# Patient Record
Sex: FEMALE | Race: WHITE | NOT HISPANIC OR LATINO | Employment: OTHER | URBAN - METROPOLITAN AREA
[De-identification: names, ages, dates, MRNs, and addresses within clinical notes are randomized per-mention and may not be internally consistent; named-entity substitution may affect disease eponyms.]

---

## 2017-01-10 ENCOUNTER — ALLSCRIPTS OFFICE VISIT (OUTPATIENT)
Dept: OTHER | Facility: OTHER | Age: 62
End: 2017-01-10

## 2017-01-10 DIAGNOSIS — M17.11 PRIMARY OSTEOARTHRITIS OF RIGHT KNEE: ICD-10-CM

## 2017-01-31 ENCOUNTER — GENERIC CONVERSION - ENCOUNTER (OUTPATIENT)
Dept: OTHER | Facility: OTHER | Age: 62
End: 2017-01-31

## 2017-02-02 ENCOUNTER — APPOINTMENT (OUTPATIENT)
Dept: PHYSICAL THERAPY | Facility: MEDICAL CENTER | Age: 62
End: 2017-02-02
Payer: COMMERCIAL

## 2017-02-02 PROCEDURE — 97162 PT EVAL MOD COMPLEX 30 MIN: CPT

## 2017-02-06 ENCOUNTER — GENERIC CONVERSION - ENCOUNTER (OUTPATIENT)
Dept: OTHER | Facility: OTHER | Age: 62
End: 2017-02-06

## 2017-02-07 ENCOUNTER — APPOINTMENT (OUTPATIENT)
Dept: PHYSICAL THERAPY | Facility: MEDICAL CENTER | Age: 62
End: 2017-02-07
Payer: COMMERCIAL

## 2017-02-10 ENCOUNTER — APPOINTMENT (OUTPATIENT)
Dept: PHYSICAL THERAPY | Facility: MEDICAL CENTER | Age: 62
End: 2017-02-10
Payer: COMMERCIAL

## 2017-02-13 ENCOUNTER — APPOINTMENT (OUTPATIENT)
Dept: PHYSICAL THERAPY | Facility: MEDICAL CENTER | Age: 62
End: 2017-02-13
Payer: COMMERCIAL

## 2017-02-13 PROCEDURE — 97110 THERAPEUTIC EXERCISES: CPT

## 2017-02-16 ENCOUNTER — APPOINTMENT (OUTPATIENT)
Dept: PHYSICAL THERAPY | Facility: MEDICAL CENTER | Age: 62
End: 2017-02-16
Payer: COMMERCIAL

## 2017-02-20 ENCOUNTER — APPOINTMENT (OUTPATIENT)
Dept: PHYSICAL THERAPY | Facility: MEDICAL CENTER | Age: 62
End: 2017-02-20
Payer: COMMERCIAL

## 2017-02-20 PROCEDURE — 97110 THERAPEUTIC EXERCISES: CPT

## 2017-02-22 ENCOUNTER — APPOINTMENT (OUTPATIENT)
Dept: PHYSICAL THERAPY | Facility: MEDICAL CENTER | Age: 62
End: 2017-02-22
Payer: COMMERCIAL

## 2017-02-22 PROCEDURE — 97110 THERAPEUTIC EXERCISES: CPT

## 2017-02-28 ENCOUNTER — APPOINTMENT (OUTPATIENT)
Dept: PHYSICAL THERAPY | Facility: MEDICAL CENTER | Age: 62
End: 2017-02-28
Payer: COMMERCIAL

## 2017-02-28 PROCEDURE — 97110 THERAPEUTIC EXERCISES: CPT

## 2017-03-02 ENCOUNTER — APPOINTMENT (OUTPATIENT)
Dept: PHYSICAL THERAPY | Facility: MEDICAL CENTER | Age: 62
End: 2017-03-02
Payer: COMMERCIAL

## 2017-03-02 PROCEDURE — 97110 THERAPEUTIC EXERCISES: CPT

## 2017-03-06 ENCOUNTER — ALLSCRIPTS OFFICE VISIT (OUTPATIENT)
Dept: OTHER | Facility: OTHER | Age: 62
End: 2017-03-06

## 2017-03-07 ENCOUNTER — APPOINTMENT (OUTPATIENT)
Dept: PHYSICAL THERAPY | Facility: MEDICAL CENTER | Age: 62
End: 2017-03-07
Payer: COMMERCIAL

## 2017-03-10 ENCOUNTER — APPOINTMENT (OUTPATIENT)
Dept: PHYSICAL THERAPY | Facility: MEDICAL CENTER | Age: 62
End: 2017-03-10
Payer: COMMERCIAL

## 2017-03-20 ENCOUNTER — APPOINTMENT (OUTPATIENT)
Dept: PHYSICAL THERAPY | Facility: MEDICAL CENTER | Age: 62
End: 2017-03-20
Payer: COMMERCIAL

## 2017-03-27 ENCOUNTER — APPOINTMENT (OUTPATIENT)
Dept: PHYSICAL THERAPY | Facility: MEDICAL CENTER | Age: 62
End: 2017-03-27
Payer: COMMERCIAL

## 2017-03-27 PROCEDURE — 97110 THERAPEUTIC EXERCISES: CPT

## 2017-05-26 ENCOUNTER — ALLSCRIPTS OFFICE VISIT (OUTPATIENT)
Dept: OTHER | Facility: OTHER | Age: 62
End: 2017-05-26

## 2017-09-15 ENCOUNTER — ALLSCRIPTS OFFICE VISIT (OUTPATIENT)
Dept: OTHER | Facility: OTHER | Age: 62
End: 2017-09-15

## 2017-10-26 NOTE — PROGRESS NOTES
Assessment  1  Acute non-recurrent frontal sinusitis (461 1) (J01 10)    Plan  Acute non-recurrent frontal sinusitis    · Sulfamethoxazole-Trimethoprim 800-160 MG Oral Tablet (Bactrim DS); TAKE 1  TABLET TWICE DAILY UNTIL FINISHED    Discussion/Summary    #1: Acute sinus infection: Patient appears have a frontal sinus infection at this point  Recommend antibiotics, Flonase 2 sprays each nostril daily, Claritin 10 mg as needed for allergy symptoms  Bactrim DS BID  Possible side effects of new medications were reviewed with the patient/guardian today  The treatment plan was reviewed with the patient/guardian  The patient/guardian understands and agrees with the treatment plan      Chief Complaint  c/o throat tickle and ear pain x 1 day  History of Present Illness  HPI: She is [place] a [place] soon to [place]  does have her grandson and his girlfriend and their child  has some issues with ears and throat  face pain, but no tooth pain  did have increased pain last night  Review of Systems    Constitutional: No fever, no chills, feels well, no tiredness, no recent weight gain or loss  ENT: as noted in HPI  Cardiovascular: no complaints of slow or fast heart rate, no chest pain, no palpitations, no leg claudication or lower extremity edema  Respiratory: no complaints of shortness of breath, no wheezing, no dyspnea on exertion, no orthopnea or PND  Breasts: no complaints of breast pain, breast lump or nipple discharge  Gastrointestinal: no complaints of abdominal pain, no constipation, no nausea or diarrhea, no vomiting, no bloody stools  Genitourinary: no complaints of dysuria, no incontinence, no pelvic pain, no dysmenorrhea, no vaginal discharge or abnormal vaginal bleeding  Musculoskeletal: no complaints of arthralgia, no myalgia, no joint swelling or stiffness, no limb pain or swelling  Integumentary: no complaints of skin rash or lesion, no itching or dry skin, no skin wounds     Neurological: no complaints of headache, no confusion, no numbness or tingling, no dizziness or fainting  Active Problems  1  Acid reflux (530 81) (K21 9)   2  Allergic rhinitis (477 9) (J30 9)   3  Angular cheilitis (528 5) (K13 0)   4  Back spasm (724 8) (M62 830)   5  Bilateral carpal tunnel syndrome (354 0) (G56 03)   6  Bipolar 1 disorder (296 7) (F31 9)   7  Chronic pain of both knees (564 70,022 57) (M25 561,M25 562,G89 29)   8  Cough (786 2) (R05)   9  Diverticulosis (562 10) (K57 90)   10  Eustachian tube dysfunction (381 81) (H69 80)   11  Flu vaccine need (V04 81) (Z23)   12  Hyperlipidemia (272 4) (E78 5)   13  Paresthesias/numbness (782 0) (R20 9)   14  Primary localized osteoarthritis of left knee (715 16) (M17 12)   15  Primary osteoarthritis of right knee (715 16) (M17 11)   16  Screening for colon cancer (V76 51) (Z12 11)   17  Vitamin D deficiency (268 9) (E55 9)    Past Medical History  1  History of Acute frontal sinusitis (461 1) (J01 10)   2  History of Acute otitis media, unspecified laterality   3  History of Acute serous otitis media, unspecified laterality   4  History of Acute sinusitis (461 9) (J01 90)   5  History of acute sinusitis (V12 69) (Z87 09)   6  History of serous otitis media (V12 49) (Z86 69)   7  History of Otalgia, unspecified laterality (388 70) (H92 09)   8  History of Otitis media, left (382 9) (H66 92)   9  History of Uncomplicated alcohol abuse (305 00) (F10 10)   10  History of Visit for eye and vision exam (V72 0) (Z01 00)  Active Problems And Past Medical History Reviewed: The active problems and past medical history were reviewed and updated today  Family History  Mother    1  Family history of Breast Cancer (V16 3)  Father    2  Family history of Alcohol Abuse   3  Family history of Coronary Artery Disease (V17 49)  Brother    4  Family history of Alcohol Abuse   5  Family history of Alcohol Abuse   6  Family history of Bipolar Disorder  Family History Reviewed:    The family history was reviewed and updated today  Social History   · History of Alcohol Use (History)   · Denied: History of Drug Use   · Former smoker (V15 82) (B61 157)   · Marital History - Currently   The social history was reviewed and updated today  The social history was reviewed and is unchanged  Surgical History  1  History of Incisional Breast Biopsy  Surgical History Reviewed: The surgical history was reviewed and updated today  Current Meds   1  BuPROPion HCl ER (XL) 300 MG Oral Tablet Extended Release 24 Hour; TAKE 1   TABLET DAILY  Requested for: 86UJH0378; Last Rx:18Ziw8088 Ordered   2  Escitalopram Oxalate 10 MG Oral Tablet; take 1 tablet by mouth daily; Therapy: 12JDW2473 to (Jennifer Flair)  Requested for: 75DEE6406; Last   Rx:20Hsn4861 Ordered   3  Fluticasone Propionate 50 MCG/ACT Nasal Suspension; USE 2 SPRAYS IN EACH   NOSTRIL ONCE DAILY; Therapy: 75AMA4717 to (Evaluate:11Att4316)  Requested for: 76Gxf7716; Last   Rx:31Wgi4934 Ordered   4  Premarin 0 625 MG/GM Vaginal Cream;   Therapy: 55Fbk0751 to (Last Rx:73Uxc9577)  Requested for: 86Nrh9776 Ordered    The medication list was reviewed and updated today  Allergies  1  No Known Drug Allergies    Vitals   Recorded: 04Ejr5718 10:13AM   Heart Rate 72   Systolic 930, LUE, Sitting   Diastolic 80, LUE, Sitting     Physical Exam    Constitutional   General appearance: No acute distress, well appearing and well nourished  Eyes   Conjunctiva and lids: No swelling, erythema or discharge  Pupils and irises: Equal, round and reactive to light  Ears, Nose, Mouth, and Throat   External inspection of ears and nose: Normal     Otoscopic examination: Tympanic membranes translucent with normal light reflex  Canals patent without erythema  Nasal mucosa, septum, and turbinates: Abnormal  -- (some facial tenderness)   Oropharynx: Normal with no erythema, edema, exudate or lesions      Pulmonary   Respiratory effort: No increased work of breathing or signs of respiratory distress  Auscultation of lungs: Clear to auscultation  Cardiovascular   Auscultation of heart: Normal rate and rhythm, normal S1 and S2, without murmurs  Signatures   Electronically signed by :  CAPO Sanchez ; Sep 15 2017 10:34AM EST                       (Author)

## 2018-01-02 ENCOUNTER — ALLSCRIPTS OFFICE VISIT (OUTPATIENT)
Dept: OTHER | Facility: OTHER | Age: 63
End: 2018-01-02

## 2018-01-03 NOTE — PROGRESS NOTES
Assessment   1  Bilateral acute serous otitis media, recurrence not specified (381 01) (H65 03)    Plan   Bilateral acute serous otitis media, recurrence not specified    · Amoxicillin 500 MG Oral Capsule; TAKE 1 CAPSULE 3 times daily  Bipolar 1 disorder    · BuPROPion HCl ER (XL) 300 MG Oral Tablet Extended Release 24 Hour    (Wellbutrin XL); TAKE 1 TABLET DAILY  Health Maintenance    · Escitalopram Oxalate 10 MG Oral Tablet (Lexapro); take 1 tablet by mouth daily    Discussion/Summary      #1: OM: Recommend Amox  Follow in 2 weeks if needed  Continue PRN Katerina Braswell  No other concerns  Chief Complaint   c/o dry cough, blocked, painful ears, headache, backache, epigastric discomfort, body aches and chills  Onset 2 days ago  History of Present Illness   HPI: See CC  is living with them and he is ill  also has some symptoms  aches, dry cough  face pain  Ears hurt  ST  Some dizziness, chills and HA  tooth pain  PN gtt  cold so far, and Mucinex  Review of Systems        Constitutional: No fever, no chills, feels well, no tiredness, no recent weight gain or loss  ENT: as noted in HPI  Cardiovascular: no complaints of slow or fast heart rate, no chest pain, no palpitations, no leg claudication or lower extremity edema  Respiratory: no complaints of shortness of breath, no wheezing, no dyspnea on exertion, no orthopnea or PND  Active Problems   1  Acid reflux (530 81) (K21 9)   2  Acute non-recurrent frontal sinusitis (461 1) (J01 10)   3  Allergic rhinitis (477 9) (J30 9)   4  Angular cheilitis (528 5) (K13 0)   5  Back spasm (724 8) (M62 830)   6  Bilateral carpal tunnel syndrome (354 0) (G56 03)   7  Bipolar 1 disorder (296 7) (F31 9)   8  Chronic pain of both knees (832 44,020 54) (M25 561,M25 562,G89 29)   9  Cough (786 2) (R05)   10  Diverticulosis (562 10) (K57 90)   11  Eustachian tube dysfunction (381 81) (H69 80)   12  Flu vaccine need (V04 81) (Z23)   13  Hyperlipidemia (272 4) (E78 5)   14  Paresthesias/numbness (782 0) (R20 9)   15  Primary localized osteoarthritis of left knee (715 16) (M17 12)   16  Primary osteoarthritis of right knee (715 16) (M17 11)   17  Screening for colon cancer (V76 51) (Z12 11)   18  Vitamin D deficiency (268 9) (E55 9)    Past Medical History   1  History of Acute frontal sinusitis (461 1) (J01 10)   2  History of Acute otitis media, unspecified laterality   3  History of Acute serous otitis media, unspecified laterality   4  History of Acute sinusitis (461 9) (J01 90)   5  History of acute sinusitis (V12 69) (Z87 09)   6  History of serous otitis media (V12 49) (Z86 69)   7  History of Otalgia, unspecified laterality (388 70) (H92 09)   8  History of Otitis media, left (382 9) (H66 92)   9  History of Uncomplicated alcohol abuse (305 00) (F10 10)   10  History of Visit for eye and vision exam (V72 0) (Z01 00)  Active Problems And Past Medical History Reviewed: The active problems and past medical history were reviewed and updated today  Family History   Mother    1  Family history of Breast Cancer (V16 3)  Father    2  Family history of Alcohol Abuse   3  Family history of Coronary Artery Disease (V17 49)  Brother    4  Family history of Alcohol Abuse   5  Family history of Alcohol Abuse   6  Family history of Bipolar Disorder  Family History Reviewed: The family history was reviewed and updated today  Social History    · History of Alcohol Use (History)   · Denied: History of Drug Use   · Former smoker (V15 82) (Z43 860)   · Marital History - Currently   The social history was reviewed and updated today  The social history was reviewed and is unchanged  Surgical History   1  History of Incisional Breast Biopsy  Surgical History Reviewed: The surgical history was reviewed and updated today  Current Meds    1   BuPROPion HCl ER (XL) 300 MG Oral Tablet Extended Release 24 Hour; TAKE 1     TABLET DAILY Requested for: 88XGT5045; Last Rx:20Jgb0495 Ordered   2  Escitalopram Oxalate 10 MG Oral Tablet; take 1 tablet by mouth daily; Therapy: 24ZKM5257 to (Soham Junes)  Requested for: 03TCN3521; Last     Rx:59Tig8882 Ordered   3  Fluticasone Propionate 50 MCG/ACT Nasal Suspension; USE 2 SPRAYS IN EACH     NOSTRIL ONCE DAILY; Therapy: 07XMM5620 to (Evaluate:97Wri6966)  Requested for: 28Apr2016; Last     Rx:61Nxo8131 Ordered   4  Premarin 0 625 MG/GM Vaginal Cream;     Therapy: 24Tui6909 to (Last Rx:75Oxw2374)  Requested for: 28Kst2600 Ordered     The medication list was reviewed and updated today  Allergies   1  No Known Drug Allergies    Vitals    Recorded: 18DTB4261 11:13AM   Temperature 98 1 F, Tympanic   Heart Rate 60, L Radial   Pulse Quality Regular, L Radial   Systolic 085, LUE, Sitting   Diastolic 80, LUE, Sitting   BP CUFF SIZE Standard   Height 5 ft 7 in   Weight 170 lb 6 4 oz   BMI Calculated 26 69   BSA Calculated 1 89     Physical Exam        Constitutional      General appearance: No acute distress, well appearing and well nourished  Ears, Nose, Mouth, and Throat      External inspection of ears and nose: Normal        Otoscopic examination: Abnormal   The right tympanic membrane was red-- and-- was bulging  The left tympanic membrane was red-- and-- was bulging  The right external canal was normal  The left external canal was normal       Nasal mucosa, septum, and turbinates: Normal without edema or erythema  -- (nasal congestion, some sinus tenderness)      Oropharynx: Normal with no erythema, edema, exudate or lesions  Pulmonary      Respiratory effort: No increased work of breathing or signs of respiratory distress  Auscultation of lungs: Clear to auscultation  Cardiovascular      Auscultation of heart: Normal rate and rhythm, normal S1 and S2, without murmurs  Signatures    Electronically signed by :  CAPO Pan ; Jan 2 2018 11:37AM EST (Author)

## 2018-01-11 NOTE — RESULT NOTES
Verified Results  * XR KNEE 3 VW RIGHT NON INJURY 37Lrt4910 09:44AM Loraine Izquierdo Order Number: [id number]     Test Name Result Flag Reference   XR KNEE 3 VW RIGHT (Report)     RIGHT KNEE     INDICATION: Right knee pain  COMPARISON: None     VIEWS: 3; 3 images     FINDINGS:     There is no acute fracture or dislocation  There is no joint effusion  Narrowing of the medial and lateral joint compartments  Sharpening of the tibial spines  Narrowing of the patellofemoral space  Mild lateral subluxation of the patella on the sunrise view  Subchondral cystic changes in the anterior lateral articular    surface of the femur  No lytic or blastic lesions are seen  Soft tissues are unremarkable  IMPRESSION:   Mild degenerative changes with mild lateral subluxation of the patella in the sunrise view  Mild chondromalacia patella  No acute osseous abnormality         Workstation performed: [id number]     Signed by:   Livia Causey DO   9/30/16

## 2018-01-12 VITALS
DIASTOLIC BLOOD PRESSURE: 74 MMHG | WEIGHT: 164.5 LBS | BODY MASS INDEX: 25.82 KG/M2 | HEIGHT: 67 IN | SYSTOLIC BLOOD PRESSURE: 135 MMHG | HEART RATE: 74 BPM

## 2018-01-13 VITALS
HEART RATE: 76 BPM | WEIGHT: 162.8 LBS | HEIGHT: 67 IN | DIASTOLIC BLOOD PRESSURE: 74 MMHG | SYSTOLIC BLOOD PRESSURE: 132 MMHG | BODY MASS INDEX: 25.55 KG/M2

## 2018-01-13 VITALS — HEIGHT: 67 IN | SYSTOLIC BLOOD PRESSURE: 112 MMHG | HEART RATE: 65 BPM | DIASTOLIC BLOOD PRESSURE: 82 MMHG

## 2018-01-13 NOTE — RESULT NOTES
Verified Results  (1) OCCULT BLOOD, FECAL IMMUNOCHEMICAL TEST 65MFQ5636 01:11PM Juliet Rizzo Order Number: [id number]     Test Name Result Flag Reference   OCCULT BLD, FECAL IMMUNOLOGICAL Negative  Negative   Performed by Fecal Immunochemical Test

## 2018-01-14 VITALS — HEART RATE: 72 BPM | DIASTOLIC BLOOD PRESSURE: 80 MMHG | SYSTOLIC BLOOD PRESSURE: 140 MMHG

## 2018-01-15 ENCOUNTER — GENERIC CONVERSION - ENCOUNTER (OUTPATIENT)
Dept: OTHER | Facility: OTHER | Age: 63
End: 2018-01-15

## 2018-01-15 LAB — SOURCE (HISTORICAL): NORMAL

## 2018-01-18 NOTE — RESULT NOTES
Message   pls let pt know her lab results are in   she has an apt to review with TH but not until Nov she should see if she can move this apt up as she does have some abnormal labs to review   non urgent   high LDL and low Vit D      Verified Results  (1) LIPID PANEL, FASTING 29Sep2016 09:58AM Ora Lopez Order Number: [id number]     Test Name Result Flag Reference   CHOLESTEROL 218 mg/dL H    HDL,DIRECT 60 mg/dL  40-60   Specimen collection should occur prior to Metamizole administration due to the potential for falsely depressed results  LDL CHOLESTEROL CALCULATED 140 mg/dL H 0-100   - Patient Instructions: This is a fasting blood test  Water,black tea or black  coffee only after 9:00pm the night before test   Drink 2 glasses of water the morning of test     - Patient Instructions: This is a fasting blood test  Water,black tea or black  coffee only after 9:00pm the night before test Drink 2 glasses of water the morning of test   Triglyceride:         Normal              <150 mg/dl       Borderline High    150-199 mg/dl       High               200-499 mg/dl       Very High          >499 mg/dl  Cholesterol:         Desirable        <200 mg/dl      Borderline High  200-239 mg/dl      High             >239 mg/dl  HDL Cholesterol:        High    >59 mg/dL      Low     <41 mg/dL  LDL CALCULATED:    This screening LDL is a calculated result  It does not have the accuracy of the Direct Measured LDL in the monitoring of patients with hyperlipidemia and/or statin therapy  Direct Measure LDL (VFI631) must be ordered separately in these patients  TRIGLYCERIDES 92 mg/dL  <=150   Specimen collection should occur prior to N-Acetylcysteine or Metamizole administration due to the potential for falsely depressed results       (1) COMPREHENSIVE METABOLIC PANEL 47MPU3178 30:19RN Ora Lopez Order Number: [id number]     Test Name Result Flag Reference   GLUCOSE,RANDM 83 mg/dL    If the patient is fasting, the ADA then defines impaired fasting glucose as > 100 mg/dL and diabetes as > or equal to 123 mg/dL  SODIUM 135 mmol/L L 136-145   POTASSIUM 4 3 mmol/L  3 5-5 3   CHLORIDE 99 mmol/L L 100-108   CARBON DIOXIDE 28 mmol/L  21-32   ANION GAP (CALC) 8 mmol/L  4-13   BLOOD UREA NITROGEN 10 mg/dL  5-25   CREATININE 0 59 mg/dL L 0 60-1 30   Standardized to IDMS reference method   CALCIUM 8 5 mg/dL  8 3-10 1   BILI, TOTAL 0 92 mg/dL  0 20-1 00   ALK PHOSPHATAS 56 U/L     ALT (SGPT) 30 U/L  12-78   AST(SGOT) 31 U/L  5-45   ALBUMIN 4 0 g/dL  3 5-5 0   TOTAL PROTEIN 7 8 g/dL  6 4-8 2   eGFR Non-African American      >60 0 ml/min/1 73sq m   - Patient Instructions: This is a fasting blood test  Water,black tea or black  coffee only after 9:00pm the night before test Drink 2 glasses of water the morning of test   National Kidney Disease Education Program recommendations are as follows:  GFR calculation is accurate only with a steady state creatinine  Chronic Kidney disease less than 60 ml/min/1 73 sq  meters  Kidney failure less than 15 ml/min/1 73 sq  meters  (1) VITAMIN D 25-HYDROXY 55Fmt4333 09:58AM Jose Anderson Order Number: [id number]     Test Name Result Flag Reference   VIT D 25-HYDROX 22 5 ng/mL L 30 0-100 0   This assay is a certified procedure of the CDC Vitamin D Standardization Certification Program (VDSCP)     Deficiency <20ng/ml   Insufficiency 20-30ng/ml   Sufficient  ng/ml     *Patients undergoing fluorescein dye angiography may retain small amounts of fluorescein in the body for 48-72 hours post procedure  Samples containing fluorescein can produce falsely elevated Vitamin D values  If the patient had this procedure, a specimen should be resubmitted post fluorescein clearance      This assay is a certified procedure of the CDC Vitamin D Standardization Certification Program (VDSCP)     Deficiency <20ng/ml   Insufficiency 20-30ng/ml Sufficient  ng/ml     *Patients undergoing fluorescein dye angiography may retain small amounts of fluorescein in the body for 48-72 hours post procedure  Samples containing fluorescein can produce falsely elevated Vitamin D values  If the patient had this procedure, a specimen should be resubmitted post fluorescein clearance

## 2018-01-22 VITALS
DIASTOLIC BLOOD PRESSURE: 75 MMHG | BODY MASS INDEX: 26.06 KG/M2 | HEART RATE: 67 BPM | SYSTOLIC BLOOD PRESSURE: 118 MMHG | HEIGHT: 67 IN | WEIGHT: 166 LBS

## 2018-01-23 VITALS
BODY MASS INDEX: 26.74 KG/M2 | TEMPERATURE: 98.1 F | SYSTOLIC BLOOD PRESSURE: 124 MMHG | DIASTOLIC BLOOD PRESSURE: 80 MMHG | WEIGHT: 170.4 LBS | HEIGHT: 67 IN | HEART RATE: 60 BPM

## 2018-06-29 DIAGNOSIS — F31.9 BIPOLAR 1 DISORDER (HCC): Primary | ICD-10-CM

## 2018-06-29 PROBLEM — J01.10 ACUTE NON-RECURRENT FRONTAL SINUSITIS: Status: ACTIVE | Noted: 2017-09-15

## 2018-06-29 RX ORDER — BUPROPION HYDROCHLORIDE 300 MG/1
TABLET ORAL
Qty: 90 TABLET | Refills: 1 | Status: SHIPPED | OUTPATIENT
Start: 2018-06-29 | End: 2019-01-04 | Stop reason: SDUPTHER

## 2018-07-31 DIAGNOSIS — F31.9 BIPOLAR 1 DISORDER (HCC): Primary | ICD-10-CM

## 2018-08-01 RX ORDER — ESCITALOPRAM OXALATE 10 MG/1
TABLET ORAL
Qty: 90 TABLET | Refills: 1 | Status: SHIPPED | OUTPATIENT
Start: 2018-08-01 | End: 2019-01-17 | Stop reason: SDUPTHER

## 2018-09-24 ENCOUNTER — OFFICE VISIT (OUTPATIENT)
Dept: FAMILY MEDICINE CLINIC | Facility: CLINIC | Age: 63
End: 2018-09-24
Payer: COMMERCIAL

## 2018-09-24 VITALS
HEIGHT: 67 IN | WEIGHT: 163 LBS | SYSTOLIC BLOOD PRESSURE: 120 MMHG | BODY MASS INDEX: 25.58 KG/M2 | HEART RATE: 72 BPM | DIASTOLIC BLOOD PRESSURE: 84 MMHG

## 2018-09-24 DIAGNOSIS — Z23 NEED FOR PNEUMOCOCCAL VACCINE: ICD-10-CM

## 2018-09-24 DIAGNOSIS — E78.2 MIXED HYPERLIPIDEMIA: Primary | ICD-10-CM

## 2018-09-24 DIAGNOSIS — Z78.0 POSTMENOPAUSAL: ICD-10-CM

## 2018-09-24 DIAGNOSIS — E55.9 VITAMIN D DEFICIENCY: ICD-10-CM

## 2018-09-24 DIAGNOSIS — F31.9 BIPOLAR 1 DISORDER (HCC): ICD-10-CM

## 2018-09-24 DIAGNOSIS — Z12.39 SCREENING FOR BREAST CANCER: ICD-10-CM

## 2018-09-24 DIAGNOSIS — Z23 NEED FOR INFLUENZA VACCINATION: ICD-10-CM

## 2018-09-24 PROBLEM — J01.10 ACUTE NON-RECURRENT FRONTAL SINUSITIS: Status: RESOLVED | Noted: 2017-09-15 | Resolved: 2018-09-24

## 2018-09-24 PROBLEM — S99.919A INJURY OF ANKLE: Status: ACTIVE | Noted: 2018-04-13

## 2018-09-24 PROCEDURE — 90662 IIV NO PRSV INCREASED AG IM: CPT

## 2018-09-24 PROCEDURE — 99214 OFFICE O/P EST MOD 30 MIN: CPT | Performed by: FAMILY MEDICINE

## 2018-09-24 PROCEDURE — 90472 IMMUNIZATION ADMIN EACH ADD: CPT

## 2018-09-24 PROCEDURE — 90471 IMMUNIZATION ADMIN: CPT

## 2018-09-24 PROCEDURE — 1036F TOBACCO NON-USER: CPT | Performed by: FAMILY MEDICINE

## 2018-09-24 PROCEDURE — 3008F BODY MASS INDEX DOCD: CPT | Performed by: FAMILY MEDICINE

## 2018-09-24 PROCEDURE — 1101F PT FALLS ASSESS-DOCD LE1/YR: CPT | Performed by: FAMILY MEDICINE

## 2018-09-24 PROCEDURE — 90732 PPSV23 VACC 2 YRS+ SUBQ/IM: CPT

## 2018-09-24 RX ORDER — FLUTICASONE PROPIONATE 50 MCG
2 SPRAY, SUSPENSION (ML) NASAL DAILY
COMMUNITY
Start: 2011-03-10 | End: 2022-06-30

## 2018-09-24 RX ORDER — ESTRADIOL 10 UG/1
TABLET VAGINAL
Refills: 3 | COMMUNITY
Start: 2018-08-28 | End: 2020-04-23

## 2018-09-24 NOTE — PROGRESS NOTES
Assessment/Plan:    Bipolar 1 disorder ([facility] [address] )  She is more on the depressed spectrum of Bipolar  Has been OK  Continue medications  Check labs  Hyperlipidemia  Check labs  None done recently  Not on medications  Vitamin D deficiency  Has listed vitamin D defic  She needs recheck  Follow after that  Diagnoses and all orders for this visit:    Mixed hyperlipidemia  -     Comprehensive metabolic panel; Future  -     Lipid Panel with Direct LDL reflex; Future    Vitamin D deficiency  -     Vitamin D 25 hydroxy; Future    Bipolar 1 disorder (HCC)  -     Comprehensive metabolic panel; Future  -     TSH, 3rd generation; Future  -     CBC and differential; Future    Postmenopausal  -     DXA bone density spine hip and pelvis; Future    Screening for breast cancer  -     Mammo screening bilateral w 3d & cad; Future    Need for influenza vaccination  -     influenza vaccine, 9197-5695, high-dose, PF 0 5 mL, for patients 65 yr+ (FLUZONE HIGH-DOSE)    Need for pneumococcal vaccine  -     PNEUMOCOCCAL POLYSACCHARIDE VACCINE 23-VALENT =>3YO SQ IM    Other orders  -     YUVAFEM 10 MCG TABS vaginal tablet; INSERT 1 TABLET INTO THE VAGINA 2 (TWO) TIMES A WEEK  AS NEEDED  -     fluticasone (FLONASE) 50 mcg/act nasal spray; 2 sprays into each nostril daily          Subjective:   6 month follow up to chronic conditions  Would like her routine blood tests  s     Patient ID: Maged Lancaster is a 70 y o  female  She is here to follow up on Hyperlipidemia, Bipolar, and Vit D defic  She has no concerns about cholesterol  She is not currently on Medications for it  She has not been taking Vitamin d, but has had defic  Bipolar: She is more feeling depressed at times  No current Manic symptoms              The following portions of the patient's history were reviewed and updated as appropriate: allergies, current medications, past family history, past medical history, past social history, past surgical history and problem list     Review of Systems   Constitutional: Negative  HENT: Negative  Eyes: Negative  Respiratory: Negative  Cardiovascular: Negative  Gastrointestinal: Negative  Endocrine: Negative  Genitourinary: Negative  Musculoskeletal: Negative  Skin: Negative  Allergic/Immunologic: Negative  Neurological: Negative  Hematological: Negative  Psychiatric/Behavioral: Negative  Objective:      /84   Pulse 72   Ht 5' 7" (1 702 m)   Wt 73 9 kg (163 lb)   BMI 25 53 kg/m²          Physical Exam   Constitutional: She appears well-developed and well-nourished  HENT:   Head: Normocephalic and atraumatic  Cardiovascular: Normal rate, regular rhythm and normal heart sounds  Pulses:       Carotid pulses are 2+ on the right side, and 2+ on the left side  Pulmonary/Chest: Effort normal and breath sounds normal  She has no wheezes  She has no rales  She exhibits no tenderness  Nursing note and vitals reviewed

## 2018-09-27 ENCOUNTER — TELEPHONE (OUTPATIENT)
Dept: FAMILY MEDICINE CLINIC | Facility: CLINIC | Age: 63
End: 2018-09-27

## 2018-09-27 NOTE — TELEPHONE ENCOUNTER
Pt called and states she has been unwell since receiving immunizations on 9/24/18  At office visit she received hi-dose flu and pnuemovax 23  She states she became headachy, had pain at injection site of pneumo and describes general malaise  Explained to pt that these symptoms could be mild side effects from receiving these injections or could be something else such as a virus  Advised pt to try tylenol or IBU for headache and aches and pains and to stay well hydrated  If symptoms are not resolving by tomorrow she was told to call for appt  --brain

## 2018-09-28 DIAGNOSIS — L03.113 CELLULITIS OF RIGHT UPPER EXTREMITY: Primary | ICD-10-CM

## 2018-09-28 RX ORDER — SULFAMETHOXAZOLE AND TRIMETHOPRIM 800; 160 MG/1; MG/1
1 TABLET ORAL EVERY 12 HOURS SCHEDULED
Qty: 20 TABLET | Refills: 0 | Status: SHIPPED | OUTPATIENT
Start: 2018-09-28 | End: 2018-10-08

## 2018-09-28 NOTE — PROGRESS NOTES
Assessment and Plan:    Problem List Items Addressed This Visit     None      Visit Diagnoses     Cellulitis of right upper extremity    -  Primary    Cellulitis at site of right upper extremity injection for pneumonia vaccine  Recommend Bactrim  Relevant Medications    sulfamethoxazole-trimethoprim (BACTRIM DS) 800-160 mg per tablet             Diagnoses and all orders for this visit:    Cellulitis of right upper extremity  Comments:  Cellulitis at site of right upper extremity injection for pneumonia vaccine  Recommend Bactrim  Orders:  -     sulfamethoxazole-trimethoprim (BACTRIM DS) 800-160 mg per tablet; Take 1 tablet by mouth every 12 (twelve) hours for 10 days              Subjective:      Patient ID: Carlton Walker is a 70 y o  female  CC:    No chief complaint on file  HPI:    Patient comes in for quick recheck of right upper extremity  She had an injection for pneumonia vaccine and has developed some redness and irritation at the site  She wanted to have this checked  She did have chills with this as well  The following portions of the patient's history were reviewed and updated as appropriate: allergies and problem list       Review of Systems   Constitutional: Positive for chills  HENT: Negative  Skin: Positive for rash  Data to review:       Objective: There were no vitals filed for this visit  Physical Exam   Constitutional: She appears well-developed and well-nourished  Lymphadenopathy:        Right axillary: No lateral adenopathy present  Skin: Rash noted  There is erythema

## 2018-10-23 ENCOUNTER — HOSPITAL ENCOUNTER (OUTPATIENT)
Dept: BONE DENSITY | Facility: MEDICAL CENTER | Age: 63
Discharge: HOME | End: 2018-10-23
Payer: COMMERCIAL

## 2018-10-23 ENCOUNTER — HOSPITAL ENCOUNTER (OUTPATIENT)
Dept: MAMMOGRAPHY | Facility: MEDICAL CENTER | Age: 63
Discharge: HOME | End: 2018-10-23
Payer: COMMERCIAL

## 2018-10-23 DIAGNOSIS — Z12.39 SCREENING FOR BREAST CANCER: ICD-10-CM

## 2018-10-23 DIAGNOSIS — Z78.0 POSTMENOPAUSAL: ICD-10-CM

## 2018-10-23 PROCEDURE — 77080 DXA BONE DENSITY AXIAL: CPT

## 2018-10-23 PROCEDURE — 77067 SCR MAMMO BI INCL CAD: CPT

## 2018-10-23 PROCEDURE — 77063 BREAST TOMOSYNTHESIS BI: CPT

## 2019-01-04 DIAGNOSIS — F31.9 BIPOLAR 1 DISORDER (HCC): ICD-10-CM

## 2019-01-04 RX ORDER — BUPROPION HYDROCHLORIDE 300 MG/1
TABLET ORAL
Qty: 90 TABLET | Refills: 1 | Status: SHIPPED | OUTPATIENT
Start: 2019-01-04 | End: 2019-07-06 | Stop reason: SDUPTHER

## 2019-01-07 ENCOUNTER — OFFICE VISIT (OUTPATIENT)
Dept: FAMILY MEDICINE CLINIC | Facility: CLINIC | Age: 64
End: 2019-01-07
Payer: COMMERCIAL

## 2019-01-07 VITALS
HEART RATE: 60 BPM | HEIGHT: 68 IN | DIASTOLIC BLOOD PRESSURE: 76 MMHG | BODY MASS INDEX: 25.31 KG/M2 | TEMPERATURE: 98.1 F | SYSTOLIC BLOOD PRESSURE: 120 MMHG | WEIGHT: 167 LBS

## 2019-01-07 DIAGNOSIS — H65.03 BILATERAL ACUTE SEROUS OTITIS MEDIA, RECURRENCE NOT SPECIFIED: ICD-10-CM

## 2019-01-07 DIAGNOSIS — J01.00 ACUTE NON-RECURRENT MAXILLARY SINUSITIS: Primary | ICD-10-CM

## 2019-01-07 PROCEDURE — 1160F RVW MEDS BY RX/DR IN RCRD: CPT | Performed by: FAMILY MEDICINE

## 2019-01-07 PROCEDURE — 99213 OFFICE O/P EST LOW 20 MIN: CPT | Performed by: FAMILY MEDICINE

## 2019-01-07 PROCEDURE — 1036F TOBACCO NON-USER: CPT | Performed by: FAMILY MEDICINE

## 2019-01-07 PROCEDURE — 3008F BODY MASS INDEX DOCD: CPT | Performed by: FAMILY MEDICINE

## 2019-01-07 RX ORDER — SULFAMETHOXAZOLE AND TRIMETHOPRIM 800; 160 MG/1; MG/1
1 TABLET ORAL EVERY 12 HOURS SCHEDULED
Qty: 20 TABLET | Refills: 0 | Status: SHIPPED | OUTPATIENT
Start: 2019-01-07 | End: 2019-01-17

## 2019-01-07 NOTE — PATIENT INSTRUCTIONS
Problem List Items Addressed This Visit     None      Visit Diagnoses     Acute non-recurrent maxillary sinusitis    -  Primary    Recommend antibiotics, Bactrim DS  Follow-up in 2 weeks as needed  Relevant Medications    sulfamethoxazole-trimethoprim (BACTRIM DS) 800-160 mg per tablet    Bilateral acute serous otitis media, recurrence not specified        Recommend antibiotics  Bactrim DS    Follow-up in 2 weeks as needed    Relevant Medications    sulfamethoxazole-trimethoprim (BACTRIM DS) 800-160 mg per tablet

## 2019-01-07 NOTE — PROGRESS NOTES
Assessment/Plan:    No problem-specific Assessment & Plan notes found for this encounter  Diagnoses and all orders for this visit:    Acute non-recurrent maxillary sinusitis  Comments:  Recommend antibiotics, Bactrim DS  Follow-up in 2 weeks as needed  Orders:  -     sulfamethoxazole-trimethoprim (BACTRIM DS) 800-160 mg per tablet; Take 1 tablet by mouth every 12 (twelve) hours for 10 days    Bilateral acute serous otitis media, recurrence not specified  Comments:  Recommend antibiotics  Bactrim DS  Follow-up in 2 weeks as needed  Orders:  -     sulfamethoxazole-trimethoprim (BACTRIM DS) 800-160 mg per tablet; Take 1 tablet by mouth every 12 (twelve) hours for 10 days          Subjective:patient c/o earache bilaterally, pain behind the ears, nasal congestion, headache, chills, sweats, sore throat, PND x 3 days  Patient is taking Katerina Hansford Cold with some relief  ak      Patient ID: Nohemi Ni is a 70 y o  female  Please see chief complaint  Patient denies any current fever  She does have some face pain, but no tooth pain  There is definitely posterior auricular pain  The following portions of the patient's history were reviewed and updated as appropriate: allergies, current medications, past family history, past medical history, past social history, past surgical history and problem list     Review of Systems   Constitutional: Negative  HENT:        Per HPI   Respiratory: Negative  Cardiovascular: Negative  Objective:      /76   Pulse 60   Temp 98 1 °F (36 7 °C)   Ht 5' 7 7" (1 72 m)   Wt 75 8 kg (167 lb)   Breastfeeding? No   BMI 25 62 kg/m²          Physical Exam   Constitutional: She appears well-developed and well-nourished  HENT:   Head: Normocephalic  Right Ear: Hearing and external ear normal  Tympanic membrane is erythematous  Left Ear: Hearing and external ear normal  Tympanic membrane is erythematous  Nose: Mucosal edema present   Right sinus exhibits maxillary sinus tenderness  Left sinus exhibits maxillary sinus tenderness  Mouth/Throat: Uvula is midline, oropharynx is clear and moist and mucous membranes are normal    Lymphadenopathy:     She has cervical adenopathy  Right cervical: Superficial cervical adenopathy present  No posterior cervical adenopathy present  Left cervical: Superficial cervical adenopathy present  No posterior cervical adenopathy present  Nursing note and vitals reviewed

## 2019-01-17 DIAGNOSIS — F31.9 BIPOLAR 1 DISORDER (HCC): ICD-10-CM

## 2019-01-17 RX ORDER — ESCITALOPRAM OXALATE 10 MG/1
TABLET ORAL
Qty: 90 TABLET | Refills: 1 | Status: SHIPPED | OUTPATIENT
Start: 2019-01-17 | End: 2019-07-06 | Stop reason: SDUPTHER

## 2019-03-16 ENCOUNTER — APPOINTMENT (OUTPATIENT)
Dept: LAB | Facility: MEDICAL CENTER | Age: 64
End: 2019-03-16
Payer: COMMERCIAL

## 2019-03-16 DIAGNOSIS — F31.9 BIPOLAR 1 DISORDER (HCC): ICD-10-CM

## 2019-03-16 DIAGNOSIS — E78.2 MIXED HYPERLIPIDEMIA: ICD-10-CM

## 2019-03-16 DIAGNOSIS — E55.9 VITAMIN D DEFICIENCY: ICD-10-CM

## 2019-03-16 LAB
25(OH)D3 SERPL-MCNC: 30.4 NG/ML (ref 30–100)
ALBUMIN SERPL BCP-MCNC: 3.8 G/DL (ref 3.5–5)
ALP SERPL-CCNC: 49 U/L (ref 46–116)
ALT SERPL W P-5'-P-CCNC: 27 U/L (ref 12–78)
ANION GAP SERPL CALCULATED.3IONS-SCNC: 8 MMOL/L (ref 4–13)
AST SERPL W P-5'-P-CCNC: 25 U/L (ref 5–45)
BILIRUB SERPL-MCNC: 0.62 MG/DL (ref 0.2–1)
BUN SERPL-MCNC: 13 MG/DL (ref 5–25)
CALCIUM SERPL-MCNC: 8.3 MG/DL (ref 8.3–10.1)
CHLORIDE SERPL-SCNC: 108 MMOL/L (ref 100–108)
CHOLEST SERPL-MCNC: 221 MG/DL (ref 50–200)
CO2 SERPL-SCNC: 24 MMOL/L (ref 21–32)
CREAT SERPL-MCNC: 0.64 MG/DL (ref 0.6–1.3)
GFR SERPL CREATININE-BSD FRML MDRD: 94 ML/MIN/1.73SQ M
GLUCOSE P FAST SERPL-MCNC: 83 MG/DL (ref 65–99)
HDLC SERPL-MCNC: 54 MG/DL (ref 40–60)
LDLC SERPL CALC-MCNC: 152 MG/DL (ref 0–100)
POTASSIUM SERPL-SCNC: 3.7 MMOL/L (ref 3.5–5.3)
PROT SERPL-MCNC: 7 G/DL (ref 6.4–8.2)
SODIUM SERPL-SCNC: 140 MMOL/L (ref 136–145)
TRIGL SERPL-MCNC: 74 MG/DL
TSH SERPL DL<=0.05 MIU/L-ACNC: 2.39 UIU/ML (ref 0.36–3.74)

## 2019-03-16 PROCEDURE — 84443 ASSAY THYROID STIM HORMONE: CPT

## 2019-03-16 PROCEDURE — 82306 VITAMIN D 25 HYDROXY: CPT

## 2019-03-16 PROCEDURE — 36415 COLL VENOUS BLD VENIPUNCTURE: CPT

## 2019-03-16 PROCEDURE — 80061 LIPID PANEL: CPT

## 2019-03-16 PROCEDURE — 80053 COMPREHEN METABOLIC PANEL: CPT

## 2019-03-25 ENCOUNTER — OFFICE VISIT (OUTPATIENT)
Dept: FAMILY MEDICINE CLINIC | Facility: CLINIC | Age: 64
End: 2019-03-25
Payer: COMMERCIAL

## 2019-03-25 VITALS
HEIGHT: 67 IN | SYSTOLIC BLOOD PRESSURE: 104 MMHG | HEART RATE: 76 BPM | DIASTOLIC BLOOD PRESSURE: 70 MMHG | BODY MASS INDEX: 26.06 KG/M2 | WEIGHT: 166 LBS

## 2019-03-25 DIAGNOSIS — E78.2 MIXED HYPERLIPIDEMIA: Primary | ICD-10-CM

## 2019-03-25 DIAGNOSIS — E55.9 VITAMIN D DEFICIENCY: ICD-10-CM

## 2019-03-25 DIAGNOSIS — F31.9 BIPOLAR 1 DISORDER (HCC): ICD-10-CM

## 2019-03-25 PROBLEM — M20.20 ACQUIRED HALLUX RIGIDUS: Status: ACTIVE | Noted: 2018-11-11

## 2019-03-25 PROBLEM — M77.40 METATARSALGIA: Status: ACTIVE | Noted: 2018-11-11

## 2019-03-25 PROBLEM — M20.40 HAMMER TOE: Status: ACTIVE | Noted: 2018-11-11

## 2019-03-25 PROCEDURE — 3008F BODY MASS INDEX DOCD: CPT | Performed by: FAMILY MEDICINE

## 2019-03-25 PROCEDURE — 99214 OFFICE O/P EST MOD 30 MIN: CPT | Performed by: FAMILY MEDICINE

## 2019-03-25 PROCEDURE — 1160F RVW MEDS BY RX/DR IN RCRD: CPT | Performed by: FAMILY MEDICINE

## 2019-03-25 PROCEDURE — 1036F TOBACCO NON-USER: CPT | Performed by: FAMILY MEDICINE

## 2019-03-25 RX ORDER — PRAVASTATIN SODIUM 40 MG
40 TABLET ORAL
Qty: 30 TABLET | Refills: 5 | Status: SHIPPED | OUTPATIENT
Start: 2019-03-25 | End: 2019-10-04

## 2019-03-25 NOTE — ASSESSMENT & PLAN NOTE
Reviewed cholesterol  She is at risk for heart disease given the higher cholesterol, as well as age over 48, so I would recommend starting statins  She is quite hesitant to start them  Reviewed over-the-counters that might be available to help reduce cholesterol, but those have not been shown to reduce events per se  I would recommend starting on pravastatin 20 mg daily, and recheck in 3 months  If that 1 is doing quite well, would switch back to every 6 months  After further discussion, she agreed to pravastatin at 40mg

## 2019-03-25 NOTE — PATIENT INSTRUCTIONS
Problem List Items Addressed This Visit     Bipolar 1 disorder ([facility] [address] )     Stable  Follows with Psychiatry  No concerns for now  Hyperlipidemia - Primary     Reviewed cholesterol  She is at risk for heart disease given the higher cholesterol, as well as age over 48, so I would recommend starting statins  She is quite hesitant to start them  Reviewed over-the-counters that might be available to help reduce cholesterol, but those have not been shown to reduce events per se  I would recommend starting on pravastatin 20 mg daily, and recheck in 3 months  If that 1 is doing quite well, would switch back to every 6 months  After further discussion, she agreed to pravastatin at 40mg  Relevant Medications    pravastatin (PRAVACHOL) 40 mg tablet    Other Relevant Orders    Comprehensive metabolic panel    Cholesterol, total    HDL cholesterol    LDL cholesterol, direct    Vitamin D deficiency     She is not having any changes in diet with this  She is following in the future  Check Vit D with next labs             Relevant Orders    Vitamin D 25 hydroxy

## 2019-03-25 NOTE — ASSESSMENT & PLAN NOTE
She is not having any changes in diet with this  She is following in the future  Check Vit D with next labs

## 2019-03-25 NOTE — PROGRESS NOTES
Assessment and Plan:    Problem List Items Addressed This Visit     Bipolar 1 disorder ([address] )     Stable  Follows with Psychiatry  No concerns for now  Hyperlipidemia - Primary     Reviewed cholesterol  She is at risk for heart disease given the higher cholesterol, as well as age over 48, so I would recommend starting statins  She is quite hesitant to start them  Reviewed over-the-counters that might be available to help reduce cholesterol, but those have not been shown to reduce events per se  I would recommend starting on pravastatin 20 mg daily, and recheck in 3 months  If that 1 is doing quite well, would switch back to every 6 months  After further discussion, she agreed to pravastatin at 40mg  Relevant Medications    pravastatin (PRAVACHOL) 40 mg tablet    Other Relevant Orders    Comprehensive metabolic panel    Cholesterol, total    HDL cholesterol    LDL cholesterol, direct    Vitamin D deficiency     She is not having any changes in diet with this  She is following in the future  Check Vit D with next labs  Relevant Orders    Vitamin D 25 hydroxy                 Diagnoses and all orders for this visit:    Mixed hyperlipidemia  -     pravastatin (PRAVACHOL) 40 mg tablet; Take 1 tablet (40 mg total) by mouth daily at bedtime  -     Comprehensive metabolic panel; Future  -     Cholesterol, total; Future  -     HDL cholesterol; Future  -     LDL cholesterol, direct; Future    Vitamin D deficiency  -     Vitamin D 25 hydroxy; Future    Bipolar 1 disorder (HCC)              Subjective:      Patient ID: Jimmie Hernadez is a 70 y o  female  CC:    Chief Complaint   Patient presents with    Follow-up     to review bw results  mjs       HPI:    Patient has some cold feeling in her toes  She is concerned about this being diabetes  Reviewed Cholesterol  No on meds  Bipolar: She has seen Psych  Doing well        The following portions of the patient's history were reviewed and updated as appropriate: allergies, current medications, past family history, past medical history, past social history, past surgical history and problem list       Review of Systems   Constitutional: Negative  HENT: Negative  Eyes: Negative  Respiratory: Negative  Cardiovascular: Negative  Gastrointestinal: Negative  Endocrine: Negative  Genitourinary: Negative  Musculoskeletal: Negative  Skin: Negative  Allergic/Immunologic: Negative  Neurological: Negative  Hematological: Negative  Psychiatric/Behavioral: Negative  Data to review:   Na and K normal   Creat 0 64  GFR 94  Blood sugar is 83  AST 25, ALT 27  Total cholesterol 221, , HDL 54, triglycerides 74  TSH 2 390  Vitamin-D 30 4  Objective:    Vitals:    03/25/19 1342   BP: 104/70   Pulse: 76   Weight: 75 3 kg (166 lb)   Height: 5' 7" (1 702 m)        Physical Exam   Constitutional: She appears well-developed and well-nourished  HENT:   Head: Normocephalic and atraumatic  Cardiovascular: Normal rate, regular rhythm and normal heart sounds  Pulses:       Carotid pulses are 2+ on the right side, and 2+ on the left side  Pulmonary/Chest: Effort normal and breath sounds normal  She has no wheezes  She has no rales  She exhibits no tenderness  Nursing note and vitals reviewed

## 2019-04-19 ENCOUNTER — OFFICE VISIT (OUTPATIENT)
Dept: FAMILY MEDICINE CLINIC | Facility: CLINIC | Age: 64
End: 2019-04-19
Payer: COMMERCIAL

## 2019-04-19 VITALS
BODY MASS INDEX: 26.06 KG/M2 | SYSTOLIC BLOOD PRESSURE: 122 MMHG | TEMPERATURE: 99.1 F | HEIGHT: 67 IN | DIASTOLIC BLOOD PRESSURE: 62 MMHG | HEART RATE: 68 BPM | WEIGHT: 166 LBS

## 2019-04-19 DIAGNOSIS — J01.40 ACUTE NON-RECURRENT PANSINUSITIS: Primary | ICD-10-CM

## 2019-04-19 DIAGNOSIS — J30.2 SEASONAL ALLERGIC RHINITIS, UNSPECIFIED TRIGGER: ICD-10-CM

## 2019-04-19 PROBLEM — F10.21 RECOVERING ALCOHOLIC IN REMISSION (HCC): Status: ACTIVE | Noted: 2019-04-19

## 2019-04-19 PROCEDURE — 99213 OFFICE O/P EST LOW 20 MIN: CPT | Performed by: FAMILY MEDICINE

## 2019-04-19 PROCEDURE — 1160F RVW MEDS BY RX/DR IN RCRD: CPT | Performed by: FAMILY MEDICINE

## 2019-04-19 PROCEDURE — 3008F BODY MASS INDEX DOCD: CPT | Performed by: FAMILY MEDICINE

## 2019-04-19 RX ORDER — AZITHROMYCIN 250 MG/1
TABLET, FILM COATED ORAL
Qty: 6 TABLET | Refills: 0 | Status: SHIPPED | OUTPATIENT
Start: 2019-04-19 | End: 2019-04-24

## 2019-07-05 ENCOUNTER — TELEPHONE (OUTPATIENT)
Dept: FAMILY MEDICINE CLINIC | Facility: CLINIC | Age: 64
End: 2019-07-05

## 2019-07-05 NOTE — TELEPHONE ENCOUNTER
PT HAS NOT BEEN TAKING HER CHOLESTEROL STATIN DRUG BECAUSE IT IS SUPPOSED TO BE TAKEN AT BED AND SHE FORGETS  ALSO WHEN SHE TAKES IT IT KEEPS HER UP ALL NIGHT  SHE PREFERS ATORVASTATIN ANYWAY  PT NORMALLY SEES DR MORENO  THANK YOU

## 2019-07-05 NOTE — TELEPHONE ENCOUNTER
Could you please advise when you would like patient to have blwk done that is ordered and if you would like the medication changed? Should she come in to discuss?

## 2019-07-06 DIAGNOSIS — F31.9 BIPOLAR 1 DISORDER (HCC): ICD-10-CM

## 2019-07-06 RX ORDER — BUPROPION HYDROCHLORIDE 300 MG/1
TABLET ORAL
Qty: 90 TABLET | Refills: 1 | Status: SHIPPED | OUTPATIENT
Start: 2019-07-06 | End: 2019-12-13 | Stop reason: SDUPTHER

## 2019-07-08 RX ORDER — ESCITALOPRAM OXALATE 10 MG/1
TABLET ORAL
Qty: 90 TABLET | Refills: 1 | Status: SHIPPED | OUTPATIENT
Start: 2019-07-08 | End: 2020-08-10

## 2019-09-27 ENCOUNTER — OFFICE VISIT (OUTPATIENT)
Dept: FAMILY MEDICINE CLINIC | Facility: CLINIC | Age: 64
End: 2019-09-27
Payer: MEDICARE

## 2019-09-27 ENCOUNTER — APPOINTMENT (OUTPATIENT)
Dept: LAB | Facility: CLINIC | Age: 64
End: 2019-09-27
Payer: MEDICARE

## 2019-09-27 VITALS
WEIGHT: 165 LBS | HEIGHT: 67 IN | BODY MASS INDEX: 25.9 KG/M2 | DIASTOLIC BLOOD PRESSURE: 80 MMHG | SYSTOLIC BLOOD PRESSURE: 118 MMHG | HEART RATE: 76 BPM

## 2019-09-27 DIAGNOSIS — L29.9 ITCHING: Primary | ICD-10-CM

## 2019-09-27 DIAGNOSIS — E78.2 MIXED HYPERLIPIDEMIA: ICD-10-CM

## 2019-09-27 DIAGNOSIS — E55.9 VITAMIN D DEFICIENCY: ICD-10-CM

## 2019-09-27 LAB
25(OH)D3 SERPL-MCNC: 20.4 NG/ML (ref 30–100)
ALBUMIN SERPL BCP-MCNC: 4.5 G/DL (ref 3.5–5)
ALP SERPL-CCNC: 53 U/L (ref 46–116)
ALT SERPL W P-5'-P-CCNC: 31 U/L (ref 12–78)
ANION GAP SERPL CALCULATED.3IONS-SCNC: 6 MMOL/L (ref 4–13)
AST SERPL W P-5'-P-CCNC: 25 U/L (ref 5–45)
BILIRUB SERPL-MCNC: 0.72 MG/DL (ref 0.2–1)
BUN SERPL-MCNC: 15 MG/DL (ref 5–25)
CALCIUM SERPL-MCNC: 8.9 MG/DL (ref 8.3–10.1)
CHLORIDE SERPL-SCNC: 106 MMOL/L (ref 100–108)
CHOLEST SERPL-MCNC: 227 MG/DL (ref 50–200)
CO2 SERPL-SCNC: 28 MMOL/L (ref 21–32)
CREAT SERPL-MCNC: 0.64 MG/DL (ref 0.6–1.3)
GFR SERPL CREATININE-BSD FRML MDRD: 93 ML/MIN/1.73SQ M
GLUCOSE P FAST SERPL-MCNC: 86 MG/DL (ref 65–99)
HDLC SERPL-MCNC: 62 MG/DL (ref 40–60)
LDLC SERPL DIRECT ASSAY-MCNC: 147 MG/DL (ref 0–100)
POTASSIUM SERPL-SCNC: 4.1 MMOL/L (ref 3.5–5.3)
PROT SERPL-MCNC: 7.8 G/DL (ref 6.4–8.2)
SODIUM SERPL-SCNC: 140 MMOL/L (ref 136–145)

## 2019-09-27 PROCEDURE — 36415 COLL VENOUS BLD VENIPUNCTURE: CPT

## 2019-09-27 PROCEDURE — 82306 VITAMIN D 25 HYDROXY: CPT

## 2019-09-27 PROCEDURE — 83721 ASSAY OF BLOOD LIPOPROTEIN: CPT

## 2019-09-27 PROCEDURE — 83718 ASSAY OF LIPOPROTEIN: CPT

## 2019-09-27 PROCEDURE — 80053 COMPREHEN METABOLIC PANEL: CPT

## 2019-09-27 PROCEDURE — 99213 OFFICE O/P EST LOW 20 MIN: CPT | Performed by: FAMILY MEDICINE

## 2019-09-27 PROCEDURE — 82465 ASSAY BLD/SERUM CHOLESTEROL: CPT

## 2019-09-27 RX ORDER — FLUOCINONIDE TOPICAL SOLUTION USP, 0.05% 0.5 MG/ML
SOLUTION TOPICAL 2 TIMES DAILY
Qty: 60 ML | Refills: 0 | Status: SHIPPED | OUTPATIENT
Start: 2019-09-27 | End: 2019-11-29 | Stop reason: ALTCHOICE

## 2019-09-27 NOTE — PATIENT INSTRUCTIONS
Problem List Items Addressed This Visit     Itching - Primary     At this point, her scalp looks normal, her back was normal   Other areas that she indicated were also having issues there did not appear to be problems  Recommend that she use soap that has no scents or dyes  She is currently using Aveeno, this would be reasonable without scent in it  She should avoid soap such as Jacklyn Hopper, kaushik, Dial  Antibiotic/antibacterial soap will likely cause problems  Continue off Estrogen and Progesterone  Stop Pravastatin for 2 weeks  Reeval then  Mackna for areas other than scalp  I would also recommend Lidex solution for scalp           Relevant Medications    fluocinonide (LIDEX) 0 05 % external solution

## 2019-09-27 NOTE — PROGRESS NOTES
Assessment and Plan:    Problem List Items Addressed This Visit     Itching - Primary     At this point, her scalp looks normal, her back was normal   Other areas that she indicated were also having issues there did not appear to be problems  Recommend that she use soap that has no scents or dyes  She is currently using Aveeno, this would be reasonable without scent in it  She should avoid soap such as [place], jergens, Dial  Antibiotic/antibacterial soap will likely cause problems  Continue off Estrogen and Progesterone  Stop Pravastatin for 2 weeks  Reeval then  Sarna for areas other than scalp  I would also recommend Lidex solution for scalp  Relevant Medications    fluocinonide (LIDEX) 0 05 % external solution                 Diagnoses and all orders for this visit:    Itching  -     fluocinonide (LIDEX) 0 05 % external solution; Apply topically 2 (two) times a day for 14 days              Subjective:      Patient ID: Kathe Srivastava is a 75 y o  female  CC:    Chief Complaint   Patient presents with    Itching     c/o scalp itches  And sometimes other parts of her body  She started using a Progesterone vaginal cream recently but has stopped since the itching started  mjs       HPI:    She is here for itching of scalp  She has it starting on scalp, but now everywhere  She tried a new vaginal topical progesterone cream  It did help with dryness and sex (dysparunia)  She has tried essential oils as well  No rash  She has not used the progesterone or estrogen cream for the last week  Soap: Aveno  The following portions of the patient's history were reviewed and updated as appropriate: allergies, current medications and problem list       Review of Systems   Constitutional: Negative  HENT: Negative  Eyes: Negative  Respiratory: Negative  Cardiovascular: Negative  Gastrointestinal: Negative  Endocrine: Negative  Genitourinary: Negative  Musculoskeletal: Negative  Skin:        Per HPI   Allergic/Immunologic: Negative  Neurological: Negative  Hematological: Negative  Psychiatric/Behavioral: Negative  Data to review:       Objective:    Vitals:    09/27/19 1035   BP: 118/80   Pulse: 76   Weight: 74 8 kg (165 lb)   Height: 5' 7" (1 702 m)        Physical Exam   Constitutional: She appears well-developed and well-nourished  HENT:   Head: Normocephalic and atraumatic  Cardiovascular: Normal rate, regular rhythm and normal heart sounds  Pulses:       Carotid pulses are 2+ on the right side, and 2+ on the left side  Pulmonary/Chest: Effort normal and breath sounds normal  She has no wheezes  She has no rales  She exhibits no tenderness  Skin:   Posterior scalp: normal skin examination  Nursing note and vitals reviewed

## 2019-09-27 NOTE — ASSESSMENT & PLAN NOTE
At this point, her scalp looks normal, her back was normal   Other areas that she indicated were also having issues there did not appear to be problems  Recommend that she use soap that has no scents or dyes  She is currently using Aveeno, this would be reasonable without scent in it  She should avoid soap such as kaushik Keita, Dial  Antibiotic/antibacterial soap will likely cause problems  Continue off Estrogen and Progesterone  Stop Pravastatin for 2 weeks  Reeval then  Yulissa for areas other than scalp  I would also recommend Lidex solution for scalp

## 2019-10-04 ENCOUNTER — OFFICE VISIT (OUTPATIENT)
Dept: FAMILY MEDICINE CLINIC | Facility: CLINIC | Age: 64
End: 2019-10-04
Payer: MEDICARE

## 2019-10-04 VITALS
HEART RATE: 60 BPM | WEIGHT: 164 LBS | HEIGHT: 67 IN | TEMPERATURE: 98.5 F | DIASTOLIC BLOOD PRESSURE: 84 MMHG | SYSTOLIC BLOOD PRESSURE: 118 MMHG | BODY MASS INDEX: 25.74 KG/M2 | RESPIRATION RATE: 18 BRPM

## 2019-10-04 DIAGNOSIS — Z23 ENCOUNTER FOR IMMUNIZATION: ICD-10-CM

## 2019-10-04 DIAGNOSIS — H65.02 NON-RECURRENT ACUTE SEROUS OTITIS MEDIA OF LEFT EAR: ICD-10-CM

## 2019-10-04 DIAGNOSIS — J30.2 SEASONAL ALLERGIC RHINITIS, UNSPECIFIED TRIGGER: Primary | ICD-10-CM

## 2019-10-04 DIAGNOSIS — F31.9 BIPOLAR 1 DISORDER (HCC): ICD-10-CM

## 2019-10-04 DIAGNOSIS — E55.9 VITAMIN D DEFICIENCY: ICD-10-CM

## 2019-10-04 DIAGNOSIS — E78.2 MIXED HYPERLIPIDEMIA: ICD-10-CM

## 2019-10-04 PROCEDURE — G0008 ADMIN INFLUENZA VIRUS VAC: HCPCS | Performed by: FAMILY MEDICINE

## 2019-10-04 PROCEDURE — 90662 IIV NO PRSV INCREASED AG IM: CPT | Performed by: FAMILY MEDICINE

## 2019-10-04 PROCEDURE — 99214 OFFICE O/P EST MOD 30 MIN: CPT | Performed by: FAMILY MEDICINE

## 2019-10-04 RX ORDER — AZITHROMYCIN 250 MG/1
TABLET, FILM COATED ORAL
Qty: 6 TABLET | Refills: 0 | Status: SHIPPED | OUTPATIENT
Start: 2019-10-04 | End: 2019-10-09

## 2019-10-04 RX ORDER — MELATONIN
1000 DAILY
Qty: 30 TABLET | Refills: 11
Start: 2019-10-04 | End: 2022-07-19

## 2019-10-04 RX ORDER — LORATADINE 10 MG/1
10 TABLET ORAL DAILY PRN
Qty: 30 TABLET | Refills: 5
Start: 2019-10-04 | End: 2020-10-05

## 2019-10-04 RX ORDER — ROSUVASTATIN CALCIUM 5 MG/1
5 TABLET, COATED ORAL DAILY
Qty: 30 TABLET | Refills: 5 | Status: SHIPPED | OUTPATIENT
Start: 2019-10-04 | End: 2020-04-23

## 2019-10-04 NOTE — PATIENT INSTRUCTIONS
Problem List Items Addressed This Visit     Allergic rhinitis - Primary     Recommend Claritin 10 mg once a day  We did review that she could also try Allegra or Zyrtec  Would all depend on how she did with the medication itself  All of these are available over-the-counter  [place] her [place], I would recommend that she bring the Claritin with her  Relevant Medications    loratadine (CLARITIN) 10 mg tablet    Bipolar 1 disorder (HCC)     Stable  Continue with Lexapro and Wellbutrin  I would not recommend discontinuing these as they will keep her from having significant depression symptoms  Hyperlipidemia     Cholesterol is not at goal with LDL and total cholesterol, but her HDL is excellent  She was intolerant of pravastatin 40 mg   Recommend trial Crestor 5 mg  Recheck in 6 months  Of note, the patient is planning to leave for international travel in 2 weeks, and be gone for 2 weeks  Based on this, I would recommend that she start the Crestor when she returns from her travels  Relevant Medications    rosuvastatin (CRESTOR) 5 mg tablet    Other Relevant Orders    Comprehensive metabolic panel    Lipid panel    Vitamin D deficiency     Vitamin-D deficiency noted  Her vitamin-D level is below they recommended 30  Would recommend that she start vitamin-D supplementation, 1000 units daily  Recheck in 6 months  Relevant Medications    cholecalciferol (VITAMIN D3) 1,000 units tablet    Other Relevant Orders    Vitamin D 25 hydroxy      Other Visit Diagnoses     Non-recurrent acute serous otitis media of left ear        Patient does appear to have an otitis media on the left  Recommend Zithromax  Follow in 2 weeks if needed  Mucinex p r n  Unk Shikha     Relevant Medications    azithromycin (ZITHROMAX) 250 mg tablet

## 2019-10-04 NOTE — PROGRESS NOTES
Assessment and Plan:    Problem List Items Addressed This Visit     Allergic rhinitis - Primary     Recommend Claritin 10 mg once a day  We did review that she could also try Allegra or Zyrtec  Would all depend on how she did with the medication itself  All of these are available over-the-counter  On her trip in the near future [place] seas, I would recommend that she bring the Claritin with her  Relevant Medications    loratadine (CLARITIN) 10 mg tablet    Bipolar 1 disorder (HCC)     Stable  Continue with Lexapro and Wellbutrin  I would not recommend discontinuing these as they will keep her from having significant depression symptoms  Hyperlipidemia     Cholesterol is not at goal with LDL and total cholesterol, but her HDL is excellent  She was intolerant of pravastatin 40 mg   Recommend trial Crestor 5 mg  Recheck in 6 months  Of note, the patient is planning to leave for international travel in 2 weeks, and be gone for 2 weeks  Based on this, I would recommend that she start the Crestor when she returns from her travels  Relevant Medications    rosuvastatin (CRESTOR) 5 mg tablet    Other Relevant Orders    Comprehensive metabolic panel    Lipid panel    Vitamin D deficiency     Vitamin-D deficiency noted  Her vitamin-D level is below they recommended 30  Would recommend that she start vitamin-D supplementation, 1000 units daily  Recheck in 6 months  Relevant Medications    cholecalciferol (VITAMIN D3) 1,000 units tablet    Other Relevant Orders    Vitamin D 25 hydroxy      Other Visit Diagnoses     Non-recurrent acute serous otitis media of left ear        Patient does appear to have an otitis media on the left  Recommend Zithromax  Follow in 2 weeks if needed  Mucinex p r n  Thelda Fare     Relevant Medications    azithromycin (ZITHROMAX) 250 mg tablet                 Diagnoses and all orders for this visit:    Seasonal allergic rhinitis, unspecified trigger  -     loratadine (CLARITIN) 10 mg tablet; Take 1 tablet (10 mg total) by mouth daily as needed for allergies    Mixed hyperlipidemia  -     Comprehensive metabolic panel; Future  -     Lipid panel; Future  -     rosuvastatin (CRESTOR) 5 mg tablet; Take 1 tablet (5 mg total) by mouth daily    Bipolar 1 disorder (HCC)    Vitamin D deficiency  -     Vitamin D 25 hydroxy; Future  -     cholecalciferol (VITAMIN D3) 1,000 units tablet; Take 1 tablet (1,000 Units total) by mouth daily    Non-recurrent acute serous otitis media of left ear  Comments:  Patient does appear to have an otitis media on the left  Recommend Zithromax  Follow in 2 weeks if needed  Mucinex p r n   Orders:  -     azithromycin (ZITHROMAX) 250 mg tablet; Take 2 tablets on day 1, then 1 tablet daily days 2 through 5              Subjective:      Patient ID: Tim Best is a 75 y o  female  CC:    Chief Complaint   Patient presents with    Follow-up     Patient present today for a follow up on her blood work results   Allergies     pt complaints of possible allergies icluding, bilateral ear pain, sore throat, mild cough and nasal congestion for the last 3 days  HPI:    Patient's noting some increased allergy symptoms recently  She does have some postnasal drip, ear discomfort, sore throat, cough, congestion  Usually does happen in the fall  Currently using Flonase  The patient itching seems to be much better with the lotion from before  She also has change shampoo, that seems to have helped somewhat  It seems worse with stress  Hyperlipidemia:  Patient was to be on pravastatin, but reports that she was feeling poorly when she was on it  Based on that, she stopped it not too long after starting high  Bipolar: She was not taking her medications recently  She has restarted treatment with Lexapro and Wellbutrin              The following portions of the patient's history were reviewed and updated as appropriate: allergies, current medications, past family history, past medical history, past social history, past surgical history and problem list       Review of Systems   Constitutional: Negative  HENT: Positive for congestion, postnasal drip, rhinorrhea and sore throat  Eyes: Negative  Respiratory: Negative  Cardiovascular: Negative  All other systems reviewed and are negative  Data to review:   Vitamin-D 20 4  Sodium 140, potassium 4 1, calcium 8 9  Blood sugar 86  AST 25, ALT 31  Creatinine 0 64, GFR 93  Total cholesterol 227, , HDL 62  Objective:    Vitals:    10/04/19 0852   BP: 118/84   BP Location: Right leg   Patient Position: Sitting   Cuff Size: Standard   Pulse: 60   Resp: 18   Temp: 98 5 °F (36 9 °C)   TempSrc: Temporal   Weight: 74 4 kg (164 lb)   Height: 5' 7" (1 702 m)        Physical Exam   Constitutional: She appears well-developed and well-nourished  HENT:   Head: Normocephalic and atraumatic  Cardiovascular: Normal rate, regular rhythm and normal heart sounds  Pulses:       Carotid pulses are 2+ on the right side, and 2+ on the left side  Pulmonary/Chest: Effort normal and breath sounds normal  She has no wheezes  She has no rales  She exhibits no tenderness  Nursing note and vitals reviewed

## 2019-10-04 NOTE — ASSESSMENT & PLAN NOTE
Recommend Claritin 10 mg once a day  We did review that she could also try Allegra or Zyrtec  Would all depend on how she did with the medication itself  All of these are available over-the-counter  On her trip in the near future [place] seas, I would recommend that she bring the Claritin with her

## 2019-10-04 NOTE — ASSESSMENT & PLAN NOTE
Vitamin-D deficiency noted  Her vitamin-D level is below they recommended 30  Would recommend that she start vitamin-D supplementation, 1000 units daily  Recheck in 6 months

## 2019-10-04 NOTE — ASSESSMENT & PLAN NOTE
Cholesterol is not at goal with LDL and total cholesterol, but her HDL is excellent  She was intolerant of pravastatin 40 mg   Recommend trial Crestor 5 mg  Recheck in 6 months  Of note, the patient is planning to leave for international travel in 2 weeks, and be gone for 2 weeks  Based on this, I would recommend that she start the Crestor when she returns from her travels

## 2019-10-04 NOTE — ASSESSMENT & PLAN NOTE
Stable  Continue with Lexapro and Wellbutrin  I would not recommend discontinuing these as they will keep her from having significant depression symptoms

## 2019-10-07 ENCOUNTER — TELEPHONE (OUTPATIENT)
Dept: FAMILY MEDICINE CLINIC | Facility: CLINIC | Age: 64
End: 2019-10-07

## 2019-10-07 NOTE — TELEPHONE ENCOUNTER
PT IS ON DAY 4 OUT OF 5 OF HER ANTIBIOTIC  SHE ALSO GOT HER FLU SHOT THE OTHER DAY WITH US  SHE NOW HAS A RASH ON HER STOMACH AND IS QUESTIONING IF SHE SHOULD TAKE THE 4TH PILL OR NOT  ALSO SHE IS [place]AY [place]IP [place] AND WANTS TO BE WELL BY THEN  PLEASE ADVISE ASAP  THANK YOU

## 2019-10-07 NOTE — TELEPHONE ENCOUNTER
PT CALLED BACK, SHE SAID THE RASH IS VERY SMALL AND THAT SHE FEELS COMFORTABLE TAKING THE ANTIBIOTIC, SHE ONLY HAS 2 MORE DAYS LEFT  BUT WOULD STILL LIKE IT IF SOMEONE COULD GIVE HER A CALL BACK WITH FURTHER INSTRUCTIONS    PT CAN BE REACHED [phone]

## 2019-10-08 NOTE — TELEPHONE ENCOUNTER
Patient has tolerated Zithromax in the past   Have her come over, and we can take a quick look at it, and see if it looks like hives  If she did not wish come over, she would need to look to see if she has hive-like reaction, including itching  Regardless, this should be resolved by the time she is leaving the country

## 2019-10-08 NOTE — TELEPHONE ENCOUNTER
I offered for her to come in  She declined  She said she is comfortable enough with this   She wants it added to her allergy list  I advised OV if symptoms persist

## 2019-11-29 ENCOUNTER — OFFICE VISIT (OUTPATIENT)
Dept: FAMILY MEDICINE CLINIC | Facility: CLINIC | Age: 64
End: 2019-11-29
Payer: MEDICARE

## 2019-11-29 VITALS
HEART RATE: 74 BPM | WEIGHT: 165 LBS | DIASTOLIC BLOOD PRESSURE: 80 MMHG | TEMPERATURE: 98.3 F | SYSTOLIC BLOOD PRESSURE: 110 MMHG | HEIGHT: 67 IN | BODY MASS INDEX: 25.9 KG/M2

## 2019-11-29 DIAGNOSIS — K52.9 GASTROENTERITIS: Primary | ICD-10-CM

## 2019-11-29 PROCEDURE — 99213 OFFICE O/P EST LOW 20 MIN: CPT | Performed by: FAMILY MEDICINE

## 2019-11-29 NOTE — PROGRESS NOTES
Assessment and Plan:  Follow up if not better  Problem List Items Addressed This Visit        Digestive    Gastroenteritis - Primary       She was placed on Clear liquids for 24 hours, including water, 7up,Sprite,Ginger ale, 1/2 strength Gatorade  No dairy products or caffeine for 24 hours  Imodium A/D may be used for diarrhea at a dosage of 1 tablets initially  She does not want to take a lot of Imodium  After 24 hours, a BRAT diet consisting of bananas,rice,applesauce or toast may be eaten  Diagnoses and all orders for this visit:    Gastroenteritis              Subjective:      Patient ID: Rolando Ashley is a 75 y o  female  CC:    Chief Complaint   Patient presents with    Diarrhea     Episodes of diarrhea that started Tuesday  She also has symptoms of abdominal cramping, slight nausea  Headache  -  [facility]       HPI:      This is a 57-year-old female who comes in with intermittent diarrhea the past 3 days  She also gets some abdominal cramping and then she gets diarrhea as well as some slight nausea  She also has headache off and on  She denies any vomiting  There is no blood with the stool  Her blood pressure is 110/80 and her temperature is 98 3°  Her weight is up 1 lb from the previous visit to 165 lb and her BMI is 25 8  The following portions of the patient's history were reviewed and updated as appropriate: allergies, current medications, past family history, past medical history, past social history, past surgical history and problem list       Review of Systems   Constitutional: Negative  HENT: Negative  Eyes: Negative  Respiratory: Negative  Cardiovascular: Negative  Gastrointestinal: Positive for diarrhea and nausea  Negative for abdominal distention, abdominal pain, blood in stool, constipation and vomiting  Endocrine: Negative  Genitourinary: Negative  Musculoskeletal: Negative  Skin: Negative  Allergic/Immunologic: Negative  Neurological: Negative  Hematological: Negative  Psychiatric/Behavioral: Negative  Data to review:       Objective:    Vitals:    11/29/19 1444   BP: 110/80   BP Location: Left arm   Patient Position: Sitting   Cuff Size: Large   Pulse: 74   Temp: 98 3 °F (36 8 °C)   TempSrc: Oral   Weight: 74 8 kg (165 lb)   Height: 5' 7" (1 702 m)        Physical Exam   Constitutional: She is oriented to person, place, and time  She appears well-developed and well-nourished  HENT:   Head: Normocephalic  Right Ear: External ear normal    Left Ear: External ear normal    Mouth/Throat: Oropharynx is clear and moist    Eyes: Pupils are equal, round, and reactive to light  Conjunctivae and EOM are normal    Neck: Normal range of motion  Neck supple  Cardiovascular: Normal rate, regular rhythm and normal heart sounds  Pulmonary/Chest: Effort normal and breath sounds normal    Abdominal: Soft  Bowel sounds are normal  She exhibits no distension  There is no tenderness  There is no rebound and no guarding  Musculoskeletal: Normal range of motion  Neurological: She is alert and oriented to person, place, and time  Skin: Skin is warm  Psychiatric: She has a normal mood and affect  Her behavior is normal  Judgment and thought content normal    Nursing note and vitals reviewed

## 2019-11-29 NOTE — ASSESSMENT & PLAN NOTE
She was placed on Clear liquids for 24 hours, including water, 7up,Sprite,Ginger ale, 1/2 strength Gatorade  No dairy products or caffeine for 24 hours  Imodium A/D may be used for diarrhea at a dosage of 1 tablets initially  She does not want to take a lot of Imodium  After 24 hours, a BRAT diet consisting of bananas,rice,applesauce or toast may be eaten

## 2019-12-06 ENCOUNTER — EVALUATION (OUTPATIENT)
Dept: PHYSICAL THERAPY | Facility: CLINIC | Age: 64
End: 2019-12-06
Payer: MEDICARE

## 2019-12-06 DIAGNOSIS — S93.401A SPRAIN OF RIGHT ANKLE, UNSPECIFIED LIGAMENT, INITIAL ENCOUNTER: Primary | ICD-10-CM

## 2019-12-06 PROCEDURE — 97110 THERAPEUTIC EXERCISES: CPT

## 2019-12-06 PROCEDURE — 97161 PT EVAL LOW COMPLEX 20 MIN: CPT

## 2019-12-06 NOTE — LETTER
2019    Gordo Delgado [address]  [place] [address] [address]    Patient: Spike Williamson   YOB: 1955   Date of Visit: 2019     Encounter Diagnosis     ICD-10-CM    1  Sprain of right ankle, unspecified ligament, initial encounter S93 401A        Dear Dr Randee Andrade: Thank you for your recent referral of Spike Williamson  Please review the attached evaluation summary from [facility] recent visit  Please verify that you agree with the plan of care by signing the attached order  If you have any questions or concerns, please do not hesitate to call  I sincerely appreciate the opportunity to share in the care of one of your patients and hope to have another opportunity to work with you in the near future  Sincerely,    Elizabet Fonseca, PT      Referring Provider:      I certify that I have read the below Plan of Care and certify the need for these services furnished under this plan of treatment while under my care  Claire Sethi, [address] [facility] [address]  [address]: [phone]          PT Evaluation     Today's date: 2019  Patient name: Spike Williamson  : 1955  MRN: [id number]  Referring provider: Rylie Ku DPM  Dx:   Encounter Diagnosis     ICD-10-CM    1  Sprain of right ankle, unspecified ligament, initial encounter S93 401A        Start Time: 0854  Stop Time: 0930  Total time in clinic (min): 36 minutes    Assessment  Assessment details: Spike Williamson is a 75 y o  female presenting to PT with pain, decreased ankle range of motion, decreased strength, balance deficits, and decreased tolerance to activity s/p inversion sprain 6 weeks ago  Patient would benefit from skilled PT services to address these impairments and to maximize function in order to improve quality of life   Thank you for the referral   Impairments: abnormal or restricted ROM, activity intolerance, impaired physical strength, lacks appropriate home exercise program and pain with function  Understanding of Dx/Px/POC: good   Prognosis: good    Goals  STG  1) R ankle ROM will improve 50% in 3 weeks  2) R ankle strength will improve 1/2 grade in 3 weeks  3) Standing tolerance will improve 50% in 3 weeks  LTG  1) Patient is independent in HEP  2) Patient will ascend/descend one flight of stairs independently with no increased pain in 6 weeks  3) Ambulation will be improved to PLOF in 6 weeks  4) Balance will improve to WNL in 6 weeks  5) R ankle AROM and strength will be WNL in 6 weeks  Plan  Patient would benefit from: skilled physical therapy  Planned modality interventions: cryotherapy  Planned therapy interventions: balance/weight bearing training, home exercise program, therapeutic exercise, therapeutic activities, stretching, strengthening, patient education, neuromuscular re-education, manual therapy, joint mobilization, abdominal trunk stabilization, body mechanics training, muscle pump exercises, postural training, functional ROM exercises, flexibility and gait training  Frequency: 2x week  Duration in weeks: 6  Treatment plan discussed with: patient        Subjective Evaluation    History of Present Illness  Date of onset: 10/27/2019  Mechanism of injury: Patient presents with R ankle pain after an inversion sprain about 5-6 weeks ago  She was on vacation in [facility], it was a rainy day and she slid while walking down some stairs  She got up and kept walking the rest of the day, and it started to swell by the end of the day  She was using ice packs later that day and night  When she returned home, she was given a CAM walker boot by podiatrist for which she has been wearing up until yesterday  She presents today wearing a sneaker, but says she still wears it when she needs to walk a lot or it feels more sore    Quality of life: good    Pain  Current pain rating: 3  At best pain ratin  At worst pain ratin  Quality: dull ache and discomfort  Relieving factors: rest  Aggravating factors: walking, stair climbing and running (taking care of great-grandchildren)  Progression: improved      Diagnostic Tests  X-ray: normal  MRI studies: normal  Treatments  Previous treatment: immobilization  Patient Goals  Patient goals for therapy: improved balance, increased motion and increased strength  Patient goal: be able to do normal activities without increased pain        Objective     Observations     Right Ankle/Foot   Positive for edema  Tenderness     Right Ankle/Foot   Tenderness in the anterior talofibular ligament and lateral malleolus  No tenderness in the calcaneofibular ligament  Neurological Testing     Additional Neurological Details  Sensation intact  Normal LE neuro screen      Active Range of Motion     Right Ankle/Foot   Dorsiflexion (ke): 0 degrees   Plantar flexion: 32 degrees   Inversion: 30 degrees   Eversion: 22 degrees with pain    Strength/Myotome Testing     Right Hip   Planes of Motion   Flexion: 4  Extension: 4  Abduction: 4-    Isolated Muscles   Gluteus maximums: 4  Gluteus medius: 4-    Right Knee   Flexion: 4  Extension: 4    Right Ankle/Foot   Dorsiflexion: 4  Plantar flexion: 4  Inversion: 4-  Eversion: 4-    Ambulation     Ambulation: Stairs   Ascend stairs: independent  Pattern: reciprocal  Railings: one rail  Descend stairs: independent  Pattern: non-reciprocal  Railings: one rail               Precautions: hyperlipidemia    Daily Treatment Diary     Manuals             R ankle PROM                                                    Exercise Diary              Bike             Circles (cw/ccw)             4 way ankle TB             Calf strap stretch                          HR/TR             Rocker board shifts             Prostretch             Soleus stretch Modalities             CP

## 2019-12-11 ENCOUNTER — OFFICE VISIT (OUTPATIENT)
Dept: PHYSICAL THERAPY | Facility: CLINIC | Age: 64
End: 2019-12-11
Payer: MEDICARE

## 2019-12-11 DIAGNOSIS — S93.401A SPRAIN OF RIGHT ANKLE, UNSPECIFIED LIGAMENT, INITIAL ENCOUNTER: Primary | ICD-10-CM

## 2019-12-11 PROCEDURE — 97110 THERAPEUTIC EXERCISES: CPT

## 2019-12-11 PROCEDURE — 97140 MANUAL THERAPY 1/> REGIONS: CPT

## 2019-12-11 PROCEDURE — 97112 NEUROMUSCULAR REEDUCATION: CPT

## 2019-12-11 NOTE — PROGRESS NOTES
Daily Note     Today's date: 2019  Patient name: Sarah Harden  : 1955  MRN: [id number]  Referring provider: No ref  provider found  Dx:   Encounter Diagnosis     ICD-10-CM    1  Sprain of right ankle, unspecified ligament, initial encounter S93 401A                   Subjective: Patient noted that R ankle has been feeling better  Compliance to home program reported once daily  Has been walking her dog frequently  Objective: See treatment diary below  Updated home program with HR/TR  Given pink TB  Assessment: Challenged appropriately with progression of TB for ankle 4-way  Did require frequent cueing to remind patient of proper form and technique  Difficulty relaxing during PROM of ankle  Progress as able  Plan: Continue per plan of care  Progress treatment as tolerated            Precautions: hyperlipidemia    Daily Treatment Diary   Manuals             R ankle PROM [facility]                                                   Exercise Diary              Bike 7 min            Circles (cw/ccw) x20 ea            4 way ankle TB [place]  2x10 ea            Calf strap stretch 30"x3                         HR/TR x20 ea            Rocker board shifts x20 ea            Prostretch 30"x3            Soleus stretch 30"x3                                                                                                                                                                                     Modalities             CP 10 min post

## 2019-12-13 DIAGNOSIS — F31.9 BIPOLAR 1 DISORDER (HCC): ICD-10-CM

## 2019-12-13 RX ORDER — BUPROPION HYDROCHLORIDE 300 MG/1
TABLET ORAL
Qty: 90 TABLET | Refills: 1 | Status: SHIPPED | OUTPATIENT
Start: 2019-12-13 | End: 2020-04-23 | Stop reason: ALTCHOICE

## 2019-12-16 ENCOUNTER — TRANSCRIBE ORDERS (OUTPATIENT)
Dept: PHYSICAL THERAPY | Facility: CLINIC | Age: 64
End: 2019-12-16

## 2019-12-16 DIAGNOSIS — S99.911S INJURY OF RIGHT ANKLE, SEQUELA: Primary | ICD-10-CM

## 2019-12-18 ENCOUNTER — OFFICE VISIT (OUTPATIENT)
Dept: PHYSICAL THERAPY | Facility: CLINIC | Age: 64
End: 2019-12-18
Payer: MEDICARE

## 2019-12-18 DIAGNOSIS — S93.401A SPRAIN OF RIGHT ANKLE, UNSPECIFIED LIGAMENT, INITIAL ENCOUNTER: Primary | ICD-10-CM

## 2019-12-18 PROCEDURE — 97112 NEUROMUSCULAR REEDUCATION: CPT

## 2019-12-18 PROCEDURE — 97110 THERAPEUTIC EXERCISES: CPT

## 2019-12-18 NOTE — PROGRESS NOTES
Daily Note     Today's date: 2019  Patient name: Davy Szymanski  : 1955  MRN: [id number]  Referring provider: Madison Gonzalez DPM  Dx:   Encounter Diagnosis     ICD-10-CM    1  Sprain of right ankle, unspecified ligament, initial encounter S93 401A                   Subjective: Patient reported continuing to have R lateral ankle symptoms, sometimes more after exercises  Minimal soreness after last visit  She noted telling MD yesterday her ankle is 90% improved  Objective: See treatment diary below  Assessment: Continues to require frequent cueing to maintain form and for reps and holds  Unable to tolerate soleus stretch today, stating it placed too much stress on dorsal aspect of foot  Difficulty with relaxation during PROM  Consider progressions of balance and stability as able to further increase functional strength and stability  Plan: Continue per plan of care  Progress treatment as tolerated            Precautions: hyperlipidemia    Daily Treatment Diary   Manuals            R ankle PROM [facility]                                                  Exercise Diary              Bike 7 min 8 min           Circles (cw/ccw) x20 ea x30 ea           4 way ankle TB [place]  2x10 ea Pink  2x10 ea           Calf strap stretch 30"x3 30"x3                        HR/TR x20 ea x30 ea           Rockerboard balance  1 min ea           Rocker board shifts x20 ea x30 ea           Prostretch 30"x3 30"x3           Soleus stretch 30"x3 HELD           Tandem amb  NV           Walking march  NV                                                                                                                                                          Modalities             CP 10 min post 10 min post

## 2019-12-20 ENCOUNTER — OFFICE VISIT (OUTPATIENT)
Dept: PHYSICAL THERAPY | Facility: CLINIC | Age: 64
End: 2019-12-20
Payer: MEDICARE

## 2019-12-20 DIAGNOSIS — S93.401A SPRAIN OF RIGHT ANKLE, UNSPECIFIED LIGAMENT, INITIAL ENCOUNTER: Primary | ICD-10-CM

## 2019-12-20 PROCEDURE — 97110 THERAPEUTIC EXERCISES: CPT

## 2019-12-20 PROCEDURE — 97112 NEUROMUSCULAR REEDUCATION: CPT

## 2019-12-20 NOTE — PROGRESS NOTES
Daily Note     Today's date: 2019  Patient name: Davy Szymanski  : 1955  MRN: [id number]  Referring provider: Madison Gonzalez DPM  Dx:   Encounter Diagnosis     ICD-10-CM    1  Sprain of right ankle, unspecified ligament, initial encounter S93 401A        Start Time: 08  Stop Time: 0910  Total time in clinic (min): 50 minutes    Subjective: Patient reports she still has discomfort distal to lateral malleolus, but it is doing "a lot better" than it was a week ago  Objective: See treatment diary below  Assessment: Improved tolerance and less guarding during PROM today, noting mild discomfort over lateral foot distal to lateral malleolus  Some remaining swelling in this area which is to be expected at this time  Patient would benefit from continued PT to ensure return to PLOF  Plan: Continue per plan of care    Patient will return 1x/week for 2 more weeks before discharge to home program         Precautions: hyperlipidemia    Daily Treatment Diary   Manuals           R ankle PROM [facility] AN                                                 Exercise Diary              Bike 7 min 8 min 10 min          Circles (cw/ccw) x20 ea x30 ea 30 ea          4 way ankle TB [place]  2x10 ea Pink  2x10 ea Pink 2x10 ea          Calf strap stretch 30"x3 30"x3 30"x3                       HR/TR x20 ea x30 ea 30 ea          Rockerboard balance  1 min ea 1 min ea          Rocker board shifts x20 ea x30 ea 30 ea          Prostretch 30"x3 30"x3 30"x3          Soleus stretch 30"x3 HELD D/C          Tandem amb  NV NV          Walking march  NV NV                                                                                                                                                         Modalities             CP 10 min post 10 min post defers

## 2019-12-27 ENCOUNTER — OFFICE VISIT (OUTPATIENT)
Dept: PHYSICAL THERAPY | Facility: CLINIC | Age: 64
End: 2019-12-27
Payer: MEDICARE

## 2019-12-27 DIAGNOSIS — S93.401A SPRAIN OF RIGHT ANKLE, UNSPECIFIED LIGAMENT, INITIAL ENCOUNTER: Primary | ICD-10-CM

## 2019-12-27 PROCEDURE — 97110 THERAPEUTIC EXERCISES: CPT

## 2019-12-27 PROCEDURE — 97112 NEUROMUSCULAR REEDUCATION: CPT

## 2019-12-27 PROCEDURE — 97140 MANUAL THERAPY 1/> REGIONS: CPT

## 2019-12-27 NOTE — PROGRESS NOTES
Daily Note     Today's date: 2019  Patient name: Rhona Pedraza  : 1955  MRN: [id number]  Referring provider: Bong Valenzuela DPM  Dx:   Encounter Diagnosis     ICD-10-CM    1  Sprain of right ankle, unspecified ligament, initial encounter S93 401A                   Subjective: Patient feels ankle is "near 100%"  Did have one instance of lateral ankle pain when going down stairs yesterday but overall has improved  Feels she may even be able to jog  Objective: See treatment diary below  Issued green TB for home  Assessment: Improved relaxation during PROM  Able to increase resistance for TB with minimal difficulty and no noted ankle pain  Still some cueing needed for proper form and completion of exercises  Progressed balance activities with notable increase in ankle strategy on R compared to L with walking   Plan: Continue per plan of care    Patient will return 1x/week for 1 more week before discharge to home program         Precautions: hyperlipidemia    Daily Treatment Diary   Manuals          R ankle PROM [facility] EH AN EH                                                Exercise Diary              Bike 7 min 8 min 10 min 10 min         Circles (cw/ccw) x20 ea x30 ea 30 ea x30 ea         4 way ankle TB [place]  2x10 ea Pink  2x10 ea Pink 2x10 ea Green  x20 ea         Calf strap stretch 30"x3 30"x3 30"x3 30"x3                      HR/TR x20 ea x30 ea 30 ea x30 ea         Rockerboard balance  1 min ea 1 min ea 1 min ea         Rocker board shifts x20 ea x30 ea 30 ea x30 ea         Prostretch 30"x3 30"x3 30"x3 30"x3         Soleus stretch 30"x3 HELD D/C          Tandem amb  NV NV 25 ft  x2         Walking march  NV NV 25 ft  x2                                                                                                                                                        Modalities             CP 10 min post 10 min post defers def

## 2020-01-03 ENCOUNTER — OFFICE VISIT (OUTPATIENT)
Dept: PHYSICAL THERAPY | Facility: CLINIC | Age: 65
End: 2020-01-03
Payer: MEDICARE

## 2020-01-03 DIAGNOSIS — S93.401A SPRAIN OF RIGHT ANKLE, UNSPECIFIED LIGAMENT, INITIAL ENCOUNTER: Primary | ICD-10-CM

## 2020-01-03 PROCEDURE — 97112 NEUROMUSCULAR REEDUCATION: CPT

## 2020-01-03 PROCEDURE — 97110 THERAPEUTIC EXERCISES: CPT

## 2020-01-03 NOTE — PROGRESS NOTES
Daily Note     Today's date: 1/3/2020  Patient name: Katharine Ashley  : 1955  MRN: [id number]  Referring provider: Dorinda Alatorre DPM  Dx:   Encounter Diagnosis     ICD-10-CM    1  Sprain of right ankle, unspecified ligament, initial encounter S93 401A                   Subjective: Patient had no new symptoms to report in R ankle  Overall has seen great improvement  Has not been as compliant to home program as of recently  Objective: See treatment diary below  Reviewed home program with patient prior to discharge  FOTO score improved from 60 to 70 over 5 visits  Assessment: Still has some discomfort with resisted TB inversion  Needs continued cueing for proper form during completion of exercises  Functional improvement in ankle ROM with less symptoms in lateral ankle during inversion  Educated on importance of maintaining compliance to home program       Plan: Discussed between PT and patient, will discharge to home program secondary to improvements          Precautions: hyperlipidemia    Daily Treatment Diary   Manuals 12/11 12/18 12/20 12/27 1/3        R ankle PROM [facility] AN [facility]                                               Exercise Diary              Bike 7 min 8 min 10 min 10 min 10 min        Circles (cw/ccw) x20 ea x30 ea 30 ea x30 ea x30 ea        4 way ankle TB [place]  2x10 ea Pink  2x10 ea Pink 2x10 ea Green  x20 ea Green  x20 ea        Calf strap stretch 30"x3 30"x3 30"x3 30"x3 30"x3                     HR/TR x20 ea x30 ea 30 ea x30 ea x30 ea        Rockerboard balance  1 min ea 1 min ea 1 min ea 1 min ea        Rocker board shifts x20 ea x30 ea 30 ea x30 ea x30 ea        Prostretch 30"x3 30"x3 30"x3 30"x3 30"x3        Soleus stretch 30"x3 HELD D/C          Tandem amb  NV NV 25 ft  x2 25 ft  x2        Walking march  NV NV 25 ft  x2 25 ft  x2 Modalities             CP 10 min post 10 min post defers def def

## 2020-02-04 PROCEDURE — 88305 TISSUE EXAM BY PATHOLOGIST: CPT | Performed by: PATHOLOGY

## 2020-02-05 ENCOUNTER — LAB REQUISITION (OUTPATIENT)
Dept: LAB | Facility: HOSPITAL | Age: 65
End: 2020-02-05
Payer: MEDICARE

## 2020-02-05 DIAGNOSIS — Z83.71 FAMILY HISTORY OF COLONIC POLYPS: ICD-10-CM

## 2020-02-05 DIAGNOSIS — Z12.11 ENCOUNTER FOR SCREENING FOR MALIGNANT NEOPLASM OF COLON: ICD-10-CM

## 2020-02-05 DIAGNOSIS — Z86.010 PERSONAL HISTORY OF COLONIC POLYPS: ICD-10-CM

## 2020-02-10 ENCOUNTER — OFFICE VISIT (OUTPATIENT)
Dept: FAMILY MEDICINE CLINIC | Facility: CLINIC | Age: 65
End: 2020-02-10
Payer: MEDICARE

## 2020-02-10 VITALS
DIASTOLIC BLOOD PRESSURE: 76 MMHG | HEART RATE: 64 BPM | BODY MASS INDEX: 25.24 KG/M2 | WEIGHT: 160.8 LBS | HEIGHT: 67 IN | TEMPERATURE: 99.1 F | SYSTOLIC BLOOD PRESSURE: 104 MMHG

## 2020-02-10 DIAGNOSIS — A08.4 VIRAL GASTROENTERITIS: Primary | ICD-10-CM

## 2020-02-10 PROCEDURE — 99213 OFFICE O/P EST LOW 20 MIN: CPT | Performed by: FAMILY MEDICINE

## 2020-02-10 PROCEDURE — 3008F BODY MASS INDEX DOCD: CPT | Performed by: FAMILY MEDICINE

## 2020-02-10 PROCEDURE — 4040F PNEUMOC VAC/ADMIN/RCVD: CPT | Performed by: FAMILY MEDICINE

## 2020-02-10 PROCEDURE — 1160F RVW MEDS BY RX/DR IN RCRD: CPT | Performed by: FAMILY MEDICINE

## 2020-02-10 PROCEDURE — 1036F TOBACCO NON-USER: CPT | Performed by: FAMILY MEDICINE

## 2020-02-10 NOTE — PROGRESS NOTES
Assessment and Plan:    Problem List Items Addressed This Visit     None      Visit Diagnoses     Viral gastroenteritis    -  Primary    Increase fluids, brat diet, follow in 2 weeks if needed  If no improvement in the next 2-3 days, consider IV fluids  Diagnoses and all orders for this visit:    Viral gastroenteritis  Comments:  Increase fluids, brat diet, follow in 2 weeks if needed  If no improvement in the next 2-3 days, consider IV fluids  Subjective:      Patient ID: Katharine Ashley is a 75 y o  female  CC:    Chief Complaint   Patient presents with    Nausea     Pt states she started with nausea and bodyaches, chills, etc x 1 day  She states she feels very run down  kw    Generalized Body Aches    Fatigue    Diarrhea    Chills    Headache    Earache       HPI:    Please see chief complaint  Of note, she did recently have a colonoscopy  She has had some increased nausea and sick to stomach, as well as diarrhea feeling at times  Chills, and some rigors  She has had some ear problems as well  Increased fatigue  No coughing, and no SOB  She did have some HA with this  Increased fatigue with this  The following portions of the patient's history were reviewed and updated as appropriate: allergies, current medications, past family history, past medical history, past social history, past surgical history and problem list       Review of Systems   Constitutional: Positive for activity change, appetite change, chills and fatigue  Negative for diaphoresis and fever  HENT: Negative  Eyes: Negative  Respiratory: Negative  Cardiovascular: Negative  Gastrointestinal: Negative  Endocrine: Negative  Genitourinary: Negative  Musculoskeletal: Negative  Skin: Negative  Allergic/Immunologic: Negative  Neurological: Negative  Hematological: Negative  Psychiatric/Behavioral: Negative            Data to review: Objective:    Vitals:    02/10/20 1333   BP: 104/76   BP Location: Left arm   Patient Position: Sitting   Pulse: 64   Temp: 99 1 °F (37 3 °C)   TempSrc: Tympanic   Weight: 72 9 kg (160 lb 12 8 oz)   Height: 5' 7" (1 702 m)        Physical Exam   Constitutional: She appears well-developed and well-nourished  HENT:   Head: Normocephalic and atraumatic  Cardiovascular: Normal rate, regular rhythm and normal heart sounds  Pulses:       Carotid pulses are 2+ on the right side, and 2+ on the left side  Pulmonary/Chest: Effort normal and breath sounds normal  She has no wheezes  She has no rales  She exhibits no tenderness  Abdominal: Soft  Bowel sounds are normal  She exhibits no distension and no mass  There is no tenderness  There is no rebound and no guarding  Nursing note and vitals reviewed  BMI Counseling: Body mass index is 25 18 kg/m²  The BMI is above normal  Nutrition recommendations include decreasing portion sizes, encouraging healthy choices of fruits and vegetables, decreasing fast food intake, consuming healthier snacks, limiting drinks that contain sugar, moderation in carbohydrate intake, increasing intake of lean protein, reducing intake of saturated and trans fat and reducing intake of cholesterol  Exercise recommendations include exercising 3-5 times per week  No pharmacotherapy was ordered  Falls Plan of Care: balance, strength, and gait training instructions were provided  Home safety education provided

## 2020-02-10 NOTE — PATIENT INSTRUCTIONS
Problem List Items Addressed This Visit     None      Visit Diagnoses     Viral gastroenteritis    -  Primary    Increase fluids, brat diet, follow in 2 weeks if needed  If no improvement in the next 2-3 days, consider IV fluids

## 2020-02-14 ENCOUNTER — OFFICE VISIT (OUTPATIENT)
Dept: FAMILY MEDICINE CLINIC | Facility: CLINIC | Age: 65
End: 2020-02-14
Payer: MEDICARE

## 2020-02-14 VITALS
DIASTOLIC BLOOD PRESSURE: 60 MMHG | TEMPERATURE: 98.7 F | BODY MASS INDEX: 26.21 KG/M2 | SYSTOLIC BLOOD PRESSURE: 102 MMHG | HEART RATE: 64 BPM | HEIGHT: 67 IN | WEIGHT: 167 LBS

## 2020-02-14 DIAGNOSIS — H65.03 NON-RECURRENT ACUTE SEROUS OTITIS MEDIA OF BOTH EARS: Primary | ICD-10-CM

## 2020-02-14 PROCEDURE — 1036F TOBACCO NON-USER: CPT | Performed by: FAMILY MEDICINE

## 2020-02-14 PROCEDURE — 3008F BODY MASS INDEX DOCD: CPT | Performed by: FAMILY MEDICINE

## 2020-02-14 PROCEDURE — 1160F RVW MEDS BY RX/DR IN RCRD: CPT | Performed by: FAMILY MEDICINE

## 2020-02-14 PROCEDURE — 99213 OFFICE O/P EST LOW 20 MIN: CPT | Performed by: FAMILY MEDICINE

## 2020-02-14 PROCEDURE — 4040F PNEUMOC VAC/ADMIN/RCVD: CPT | Performed by: FAMILY MEDICINE

## 2020-02-14 RX ORDER — AMOXICILLIN 875 MG/1
875 TABLET, COATED ORAL 2 TIMES DAILY
Qty: 20 TABLET | Refills: 0 | Status: SHIPPED | OUTPATIENT
Start: 2020-02-14 | End: 2020-02-24

## 2020-02-14 NOTE — PROGRESS NOTES
Assessment and Plan:    Problem List Items Addressed This Visit     None      Visit Diagnoses     Non-recurrent acute serous otitis media of both ears    -  Primary    New  Amox  Follow in 2 weeks  Relevant Medications    amoxicillin (AMOXIL) 875 mg tablet                 Diagnoses and all orders for this visit:    Non-recurrent acute serous otitis media of both ears  Comments:  New  Amox  Follow in 2 weeks  Orders:  -     amoxicillin (AMOXIL) 875 mg tablet; Take 1 tablet (875 mg total) by mouth 2 (two) times a day for 10 days              Subjective:      Patient ID: Serge Nolasco is a 75 y o  female  CC:    Chief Complaint   Patient presents with    Earache     in both ears  on and off for the past 4 days     Sore Throat    Headache    Nasal Congestion       HPI:    Patient is here today for ear symptoms  She was recently in for viral gastroenteritis, and reports that is significantly improved  Currently, she is having increasing problems with her ears  HA, Chills, ear pain, some runny nose, ST, changes in talking  No real face or tooth pain  The following portions of the patient's history were reviewed and updated as appropriate: allergies, current medications and problem list       Review of Systems   Constitutional: Negative  HENT: Positive for congestion, rhinorrhea, sore throat and trouble swallowing  Negative for nosebleeds and postnasal drip  Eyes: Negative  Respiratory: Negative  Cardiovascular: Negative  Gastrointestinal: Negative  Endocrine: Negative  Genitourinary: Negative  Musculoskeletal: Negative  Skin: Negative  Allergic/Immunologic: Negative  Neurological: Negative  Hematological: Negative  Psychiatric/Behavioral: Negative            Data to review:       Objective:    Vitals:    02/14/20 1442   BP: 102/60   BP Location: Left arm   Patient Position: Sitting   Cuff Size: Standard   Pulse: 64   Temp: 98 7 °F (37 1 °C)   TempSrc: Tympanic   Weight: 75 8 kg (167 lb)   Height: 5' 7" (1 702 m)        Physical Exam   Constitutional: She appears well-developed and well-nourished  HENT:   Head: Normocephalic and atraumatic  Right Ear: A middle ear effusion is present  Left Ear: A middle ear effusion is present  Cardiovascular: Normal rate, regular rhythm and normal heart sounds  Pulses:       Carotid pulses are 2+ on the right side, and 2+ on the left side  Pulmonary/Chest: Effort normal and breath sounds normal  She has no wheezes  She has no rales  She exhibits no tenderness  Nursing note and vitals reviewed

## 2020-02-14 NOTE — PATIENT INSTRUCTIONS
Problem List Items Addressed This Visit     None      Visit Diagnoses     Non-recurrent acute serous otitis media of both ears    -  Primary    New  Amox  Follow in 2 weeks        Relevant Medications    amoxicillin (AMOXIL) 875 mg tablet

## 2020-03-23 ENCOUNTER — TELEMEDICINE (OUTPATIENT)
Dept: FAMILY MEDICINE CLINIC | Facility: CLINIC | Age: 65
End: 2020-03-23
Payer: MEDICARE

## 2020-03-23 DIAGNOSIS — Z78.9 HISTORY OF RECENT TRAVEL: ICD-10-CM

## 2020-03-23 DIAGNOSIS — J01.40 ACUTE NON-RECURRENT PANSINUSITIS: Primary | ICD-10-CM

## 2020-03-23 DIAGNOSIS — E78.2 MIXED HYPERLIPIDEMIA: ICD-10-CM

## 2020-03-23 PROBLEM — K52.9 GASTROENTERITIS: Status: RESOLVED | Noted: 2019-11-29 | Resolved: 2020-03-23

## 2020-03-23 PROCEDURE — G2012 BRIEF CHECK IN BY MD/QHP: HCPCS | Performed by: FAMILY MEDICINE

## 2020-03-23 RX ORDER — AMOXICILLIN 875 MG/1
875 TABLET, COATED ORAL 2 TIMES DAILY
Qty: 20 TABLET | Refills: 0 | Status: SHIPPED | OUTPATIENT
Start: 2020-03-23 | End: 2020-04-02

## 2020-03-23 NOTE — PROGRESS NOTES
Virtual Brief Visit    Reason for visit is sinus  Encounter provider Renee Harris MD    Provider located at [address] [place] [address] [facility]  [address] [place] [address]      Recent Visits  No visits were found meeting these conditions  Showing recent visits within past 7 days and meeting all other requirements     Future Appointments  No visits were found meeting these conditions  Showing future appointments within next 150 days and meeting all other requirements        Patient agrees to participate in a virtual check in via telephone or video visit instead of presenting to the office to address urgent/immediate medical needs  Patient is aware this is a billable service  After connecting through telephone, the patient was identified by name and date of birth  Shad Carvajal was informed that this was a telemedicine visit and that the visit is being conducted through telephone which may not be secure and therefore might not be HIPAA-compliant  My office door was closed  No one else was in the room  She acknowledged consent and understanding of privacy and security of the virtual check-in visit  I informed the patient that I have reviewed her record in Epic and presented the opportunity for her to ask any questions regarding the visit today  The patient initiated communication and agreed to participate  Subjective  Shad Carvajal is a 75 y o  female        Past Medical History:   Diagnosis Date    Uncomplicated alcohol abuse        Past Surgical History:   Procedure Laterality Date    INCISIONAL BREAST BIOPSY         Current Outpatient Medications   Medication Sig Dispense Refill    amoxicillin (AMOXIL) 875 mg tablet Take 1 tablet (875 mg total) by mouth 2 (two) times a day for 10 days 20 tablet 0    buPROPion (WELLBUTRIN XL) 300 mg 24 hr tablet TAKE 1 TABLET BY MOUTH DAILY 90 tablet 1    cholecalciferol (VITAMIN D3) 1,000 units tablet Take 1 tablet (1,000 Units total) by mouth daily 30 tablet 11    escitalopram (LEXAPRO) 10 mg tablet TAKE 1 TABLET BY MOUTH DAILY 90 tablet 1    fluticasone (FLONASE) 50 mcg/act nasal spray 2 sprays into each nostril daily      loratadine (CLARITIN) 10 mg tablet Take 1 tablet (10 mg total) by mouth daily as needed for allergies 30 tablet 5    rosuvastatin (CRESTOR) 5 mg tablet Take 1 tablet (5 mg total) by mouth daily (Patient not taking: Reported on 11/29/2019) 30 tablet 5    YUVAFEM 10 MCG TABS vaginal tablet INSERT 1 TABLET INTO THE VAGINA 2 (TWO) TIMES A WEEK  AS NEEDED  3     No current facility-administered medications for this visit  Allergies   Allergen Reactions    Alcohol Confusion and Delirium     In Recovery    Pravastatin Other (See Comments)     Felt ill    Zithromax [Azithromycin] Diarrhea and Rash       Assessment    [facility] telephone assessment is Sinusitis   Disposition:    Abx  HPI  Telephone virtual visit  Patient feels she has a sinus   Ear ache, HA  No fever, some chills  No tooth pain  Feels like her hair hurts  She was on plane from 8Mar2020  Patient did have Crestor, but had side effects, so stopped  ROS  Review of Systems   Constitutional: Positive for activity change, chills and fatigue  Negative for fever  HENT: Positive for congestion, postnasal drip, sinus pressure and sinus pain  Negative for sore throat  Respiratory: Negative  Cardiovascular: Negative  Gastrointestinal: Negative  All other systems reviewed and are negative  Problem List Items Addressed This Visit     Hyperlipidemia     Was recommended to start Crestor, but the patient reports that she did not do well when she did  At this point, she is not taking any medications  Reviewed with her that this should be recheck later on  Other Visit Diagnoses     Acute non-recurrent pansinusitis    -  Primary    New finding  By description, patient does have sinusitis    Recommend amoxicillin secondary to allergies  Follow in 2 weeks if necessary  Relevant Medications    amoxicillin (AMOXIL) 875 mg tablet    History of recent travel        Has returned to this area more than 2 weeks ago  Minimal to low risk for COVID  COVID 19 Instructions    Bonita Plasencia was advised to limit contact with others to essential tasks such as getting food, medications, and medical care  Propper handwashing reviewed, and Hand sanitzer when washing is not available  If the patient develops symptoms of COVID 19, the patient should call the office as soon as possible  Virtual Visit expected code: [id number]      I spent 15 minutes with the patient during this virtual check-in visit

## 2020-03-23 NOTE — PATIENT INSTRUCTIONS
Problem List Items Addressed This Visit     Hyperlipidemia     Was recommended to start Crestor, but the patient reports that she did not do well when she did  At this point, she is not taking any medications  Reviewed with her that this should be recheck later on  Other Visit Diagnoses     Acute non-recurrent pansinusitis    -  Primary    New finding  By description, patient does have sinusitis  Recommend amoxicillin secondary to allergies  Follow in 2 weeks if necessary  Relevant Medications    amoxicillin (AMOXIL) 875 mg tablet    History of recent travel        Has returned to this area more than 2 weeks ago  Minimal to low risk for COVID

## 2020-03-23 NOTE — ASSESSMENT & PLAN NOTE
Was recommended to start Crestor, but the patient reports that she did not do well when she did  At this point, she is not taking any medications  Reviewed with her that this should be recheck later on

## 2020-04-02 ENCOUNTER — TELEMEDICINE (OUTPATIENT)
Dept: FAMILY MEDICINE CLINIC | Facility: CLINIC | Age: 65
End: 2020-04-02
Payer: MEDICARE

## 2020-04-02 DIAGNOSIS — J01.40 ACUTE NON-RECURRENT PANSINUSITIS: Primary | ICD-10-CM

## 2020-04-02 DIAGNOSIS — J30.1 SEASONAL ALLERGIC RHINITIS DUE TO POLLEN: ICD-10-CM

## 2020-04-02 PROCEDURE — 99213 OFFICE O/P EST LOW 20 MIN: CPT | Performed by: FAMILY MEDICINE

## 2020-04-02 RX ORDER — CEFUROXIME AXETIL 500 MG/1
500 TABLET ORAL EVERY 12 HOURS SCHEDULED
Qty: 20 TABLET | Refills: 0 | Status: SHIPPED | OUTPATIENT
Start: 2020-04-02 | End: 2020-04-12

## 2020-04-19 ENCOUNTER — TELEPHONE (OUTPATIENT)
Dept: OTHER | Facility: OTHER | Age: 65
End: 2020-04-19

## 2020-04-19 ENCOUNTER — TELEMEDICINE (OUTPATIENT)
Dept: FAMILY MEDICINE CLINIC | Facility: CLINIC | Age: 65
End: 2020-04-19
Payer: MEDICARE

## 2020-04-19 DIAGNOSIS — Z20.828 EXPOSURE TO SARS-ASSOCIATED CORONAVIRUS: Primary | ICD-10-CM

## 2020-04-19 PROCEDURE — 99442 PR PHYS/QHP TELEPHONE EVALUATION 11-20 MIN: CPT | Performed by: PHYSICIAN ASSISTANT

## 2020-04-20 ENCOUNTER — TELEPHONE (OUTPATIENT)
Dept: FAMILY MEDICINE CLINIC | Facility: CLINIC | Age: 65
End: 2020-04-20

## 2020-04-20 DIAGNOSIS — Z20.828 EXPOSURE TO SARS-ASSOCIATED CORONAVIRUS: ICD-10-CM

## 2020-04-20 PROCEDURE — 87635 SARS-COV-2 COVID-19 AMP PRB: CPT

## 2020-04-21 LAB — SARS-COV-2 RNA SPEC QL NAA+PROBE: NOT DETECTED

## 2020-04-23 ENCOUNTER — TELEMEDICINE (OUTPATIENT)
Dept: FAMILY MEDICINE CLINIC | Facility: CLINIC | Age: 65
End: 2020-04-23
Payer: MEDICARE

## 2020-04-23 ENCOUNTER — TRANSITIONAL CARE MANAGEMENT (OUTPATIENT)
Dept: FAMILY MEDICINE CLINIC | Facility: CLINIC | Age: 65
End: 2020-04-23

## 2020-04-23 DIAGNOSIS — R07.89 ATYPICAL CHEST PAIN: Primary | ICD-10-CM

## 2020-04-23 DIAGNOSIS — F31.9 BIPOLAR 1 DISORDER (HCC): ICD-10-CM

## 2020-04-23 DIAGNOSIS — R77.8 ELEVATED TROPONIN: ICD-10-CM

## 2020-04-23 DIAGNOSIS — E78.2 MIXED HYPERLIPIDEMIA: ICD-10-CM

## 2020-04-23 PROBLEM — R79.89 ELEVATED TROPONIN: Status: ACTIVE | Noted: 2020-04-22

## 2020-04-23 PROCEDURE — 99496 TRANSJ CARE MGMT HIGH F2F 7D: CPT | Performed by: FAMILY MEDICINE

## 2020-04-23 RX ORDER — ASPIRIN 81 MG/1
81 TABLET, CHEWABLE ORAL DAILY
COMMUNITY
Start: 2020-04-22 | End: 2021-04-22

## 2020-04-23 RX ORDER — ESTRADIOL 0.1 MG/G
2 CREAM VAGINAL DAILY
COMMUNITY
End: 2020-10-05

## 2020-04-23 RX ORDER — ATORVASTATIN CALCIUM 40 MG/1
40 TABLET, FILM COATED ORAL DAILY
COMMUNITY
Start: 2020-04-22 | End: 2021-04-22 | Stop reason: ALTCHOICE

## 2020-06-15 ENCOUNTER — APPOINTMENT (OUTPATIENT)
Dept: LAB | Facility: CLINIC | Age: 65
End: 2020-06-15
Payer: MEDICARE

## 2020-06-15 DIAGNOSIS — E78.2 MIXED HYPERLIPIDEMIA: ICD-10-CM

## 2020-06-15 DIAGNOSIS — E55.9 VITAMIN D DEFICIENCY: ICD-10-CM

## 2020-06-15 LAB
25(OH)D3 SERPL-MCNC: 26.3 NG/ML (ref 30–100)
ALBUMIN SERPL BCP-MCNC: 3.9 G/DL (ref 3.5–5)
ALP SERPL-CCNC: 55 U/L (ref 46–116)
ALT SERPL W P-5'-P-CCNC: 31 U/L (ref 12–78)
ANION GAP SERPL CALCULATED.3IONS-SCNC: 8 MMOL/L (ref 4–13)
AST SERPL W P-5'-P-CCNC: 29 U/L (ref 5–45)
BILIRUB SERPL-MCNC: 0.34 MG/DL (ref 0.2–1)
BUN SERPL-MCNC: 13 MG/DL (ref 5–25)
CALCIUM SERPL-MCNC: 8.5 MG/DL (ref 8.3–10.1)
CHLORIDE SERPL-SCNC: 108 MMOL/L (ref 100–108)
CHOLEST SERPL-MCNC: 186 MG/DL (ref 50–200)
CO2 SERPL-SCNC: 25 MMOL/L (ref 21–32)
CREAT SERPL-MCNC: 0.66 MG/DL (ref 0.6–1.3)
GFR SERPL CREATININE-BSD FRML MDRD: 92 ML/MIN/1.73SQ M
GLUCOSE P FAST SERPL-MCNC: 85 MG/DL (ref 65–99)
HDLC SERPL-MCNC: 56 MG/DL
LDLC SERPL CALC-MCNC: 115 MG/DL (ref 0–100)
NONHDLC SERPL-MCNC: 130 MG/DL
POTASSIUM SERPL-SCNC: 4.1 MMOL/L (ref 3.5–5.3)
PROT SERPL-MCNC: 7.2 G/DL (ref 6.4–8.2)
SODIUM SERPL-SCNC: 141 MMOL/L (ref 136–145)
TRIGL SERPL-MCNC: 74 MG/DL

## 2020-06-15 PROCEDURE — 80053 COMPREHEN METABOLIC PANEL: CPT

## 2020-06-15 PROCEDURE — 82306 VITAMIN D 25 HYDROXY: CPT

## 2020-06-15 PROCEDURE — 80061 LIPID PANEL: CPT

## 2020-06-15 PROCEDURE — 36415 COLL VENOUS BLD VENIPUNCTURE: CPT

## 2020-06-16 ENCOUNTER — OFFICE VISIT (OUTPATIENT)
Dept: FAMILY MEDICINE CLINIC | Facility: CLINIC | Age: 65
End: 2020-06-16
Payer: MEDICARE

## 2020-06-16 VITALS
BODY MASS INDEX: 25.74 KG/M2 | HEART RATE: 64 BPM | DIASTOLIC BLOOD PRESSURE: 68 MMHG | WEIGHT: 164 LBS | HEIGHT: 67 IN | SYSTOLIC BLOOD PRESSURE: 104 MMHG

## 2020-06-16 DIAGNOSIS — Z12.31 ENCOUNTER FOR SCREENING MAMMOGRAM FOR BREAST CANCER: ICD-10-CM

## 2020-06-16 DIAGNOSIS — E55.9 VITAMIN D DEFICIENCY: ICD-10-CM

## 2020-06-16 DIAGNOSIS — F31.9 BIPOLAR 1 DISORDER (HCC): ICD-10-CM

## 2020-06-16 DIAGNOSIS — E78.2 MIXED HYPERLIPIDEMIA: Primary | ICD-10-CM

## 2020-06-16 PROCEDURE — 4040F PNEUMOC VAC/ADMIN/RCVD: CPT | Performed by: FAMILY MEDICINE

## 2020-06-16 PROCEDURE — 3008F BODY MASS INDEX DOCD: CPT | Performed by: FAMILY MEDICINE

## 2020-06-16 PROCEDURE — G0402 INITIAL PREVENTIVE EXAM: HCPCS | Performed by: FAMILY MEDICINE

## 2020-06-16 PROCEDURE — 1160F RVW MEDS BY RX/DR IN RCRD: CPT | Performed by: FAMILY MEDICINE

## 2020-06-16 PROCEDURE — 99214 OFFICE O/P EST MOD 30 MIN: CPT | Performed by: FAMILY MEDICINE

## 2020-06-16 PROCEDURE — 1170F FXNL STATUS ASSESSED: CPT | Performed by: FAMILY MEDICINE

## 2020-06-16 PROCEDURE — 1036F TOBACCO NON-USER: CPT | Performed by: FAMILY MEDICINE

## 2020-06-16 PROCEDURE — 1125F AMNT PAIN NOTED PAIN PRSNT: CPT | Performed by: FAMILY MEDICINE

## 2020-08-10 DIAGNOSIS — F31.9 BIPOLAR 1 DISORDER (HCC): ICD-10-CM

## 2020-08-10 RX ORDER — ESCITALOPRAM OXALATE 10 MG/1
TABLET ORAL
Qty: 90 TABLET | Refills: 1 | Status: SHIPPED | OUTPATIENT
Start: 2020-08-10 | End: 2021-01-05 | Stop reason: SDUPTHER

## 2020-08-31 ENCOUNTER — HOSPITAL ENCOUNTER (OUTPATIENT)
Dept: MAMMOGRAPHY | Facility: MEDICAL CENTER | Age: 65
Discharge: HOME | End: 2020-08-31
Payer: MEDICARE

## 2020-08-31 VITALS — BODY MASS INDEX: 25.74 KG/M2 | WEIGHT: 164 LBS | HEIGHT: 67 IN

## 2020-08-31 DIAGNOSIS — Z12.31 ENCOUNTER FOR SCREENING MAMMOGRAM FOR BREAST CANCER: ICD-10-CM

## 2020-08-31 PROCEDURE — 77063 BREAST TOMOSYNTHESIS BI: CPT

## 2020-08-31 PROCEDURE — 77067 SCR MAMMO BI INCL CAD: CPT

## 2020-10-05 ENCOUNTER — TELEMEDICINE (OUTPATIENT)
Dept: FAMILY MEDICINE CLINIC | Facility: CLINIC | Age: 65
End: 2020-10-05
Payer: MEDICARE

## 2020-10-05 DIAGNOSIS — J30.1 SEASONAL ALLERGIC RHINITIS DUE TO POLLEN: Primary | ICD-10-CM

## 2020-10-05 DIAGNOSIS — J01.40 ACUTE NON-RECURRENT PANSINUSITIS: ICD-10-CM

## 2020-10-05 DIAGNOSIS — S02.5XXB OPEN FRACTURE OF TOOTH, INITIAL ENCOUNTER: ICD-10-CM

## 2020-10-05 PROCEDURE — 99213 OFFICE O/P EST LOW 20 MIN: CPT | Performed by: FAMILY MEDICINE

## 2020-10-05 RX ORDER — AMOXICILLIN 875 MG/1
875 TABLET, COATED ORAL 2 TIMES DAILY
Qty: 20 TABLET | Refills: 0 | Status: SHIPPED | OUTPATIENT
Start: 2020-10-05 | End: 2020-10-15

## 2020-10-05 RX ORDER — MONTELUKAST SODIUM 10 MG/1
10 TABLET ORAL
Qty: 30 TABLET | Refills: 5 | Status: SHIPPED | OUTPATIENT
Start: 2020-10-05 | End: 2021-05-07

## 2020-10-06 ENCOUNTER — TELEPHONE (OUTPATIENT)
Dept: FAMILY MEDICINE CLINIC | Facility: CLINIC | Age: 65
End: 2020-10-06

## 2020-11-12 ENCOUNTER — IMMUNIZATIONS (OUTPATIENT)
Dept: FAMILY MEDICINE CLINIC | Facility: CLINIC | Age: 65
End: 2020-11-12

## 2021-01-05 DIAGNOSIS — F31.9 BIPOLAR 1 DISORDER (HCC): ICD-10-CM

## 2021-01-06 RX ORDER — ESCITALOPRAM OXALATE 10 MG/1
10 TABLET ORAL DAILY
Qty: 90 TABLET | Refills: 1 | Status: SHIPPED | OUTPATIENT
Start: 2021-01-06 | End: 2021-07-08

## 2021-01-10 ENCOUNTER — OFFICE VISIT (OUTPATIENT)
Dept: URGENT CARE | Facility: MEDICAL CENTER | Age: 66
End: 2021-01-10
Payer: MEDICARE

## 2021-01-10 VITALS
OXYGEN SATURATION: 98 % | TEMPERATURE: 99 F | HEART RATE: 68 BPM | BODY MASS INDEX: 25.58 KG/M2 | SYSTOLIC BLOOD PRESSURE: 134 MMHG | RESPIRATION RATE: 20 BRPM | DIASTOLIC BLOOD PRESSURE: 82 MMHG | WEIGHT: 163 LBS | HEIGHT: 67 IN

## 2021-01-10 DIAGNOSIS — R07.81 RIB PAIN: ICD-10-CM

## 2021-01-10 DIAGNOSIS — R55 SYNCOPE, UNSPECIFIED SYNCOPE TYPE: Primary | ICD-10-CM

## 2021-01-10 PROCEDURE — 99213 OFFICE O/P EST LOW 20 MIN: CPT | Performed by: PHYSICIAN ASSISTANT

## 2021-01-10 PROCEDURE — G0463 HOSPITAL OUTPT CLINIC VISIT: HCPCS | Performed by: PHYSICIAN ASSISTANT

## 2021-01-10 NOTE — PROGRESS NOTES
[facility]        NAME: Zoltan Britt is a 77 y o  female  : 1955    MRN: [id number]  DATE: January 10, 2021  TIME: 11:57 AM    Assessment and Plan   Syncope, unspecified syncope type [R55]  1  Syncope, unspecified syncope type  Transfer to other facility   2  Rib pain           Patient Instructions       To emergency room now  I explained to her that we cannot evaluate his syncopal episode and this does need evaluated  Chief Complaint     Chief Complaint   Patient presents with   Tacho Barron Fall     Patient states she fell on Friday night but is not sure why she fell she doesn't remember  Patient states she is having pain all around her rib cage nad the L side of her jaw  History of Present Illness       Patient had a syncopal episode in the middle of the night while getting out of bed  She is unsure how long this lasted  This occurred the night before last   She fell and hit her ribs and today has pains in her ribs  She is not sure what caused her syncope  She has had questionable heart difficulties in the past and is scheduled for general anesthesia tomorrow for dental work  Review of Systems   Review of Systems   All other systems reviewed and are negative          Current Medications       Current Outpatient Medications:     atorvastatin (LIPITOR) 40 mg tablet, Take 40 mg by mouth daily, Disp: , Rfl:     escitalopram (LEXAPRO) 10 mg tablet, Take 1 tablet (10 mg total) by mouth daily, Disp: 90 tablet, Rfl: 1    aspirin 81 mg chewable tablet, Chew 81 mg daily, Disp: , Rfl:     cholecalciferol (VITAMIN D3) 1,000 units tablet, Take 1 tablet (1,000 Units total) by mouth daily, Disp: 30 tablet, Rfl: 11    fluticasone (FLONASE) 50 mcg/act nasal spray, 2 sprays into each nostril daily, Disp: , Rfl:     montelukast (SINGULAIR) 10 mg tablet, Take 1 tablet (10 mg total) by mouth daily at bedtime (Patient not taking: Reported on 1/10/2021), Disp: 30 tablet, Rfl: 5    Current Allergies     Allergies as of 01/10/2021 - Reviewed 01/10/2021   Allergen Reaction Noted    Alcohol Confusion and Delirium 04/19/2019    Pravastatin Other (See Comments) 10/04/2019    Zithromax [azithromycin] Diarrhea and Rash 10/08/2019            The following portions of the patient's history were reviewed and updated as appropriate: allergies, current medications, past family history, past medical history, past social history, past surgical history and problem list      Past Medical History:   Diagnosis Date    Uncomplicated alcohol abuse        Past Surgical History:   Procedure Laterality Date    BREAST BIOPSY Right     benign    INCISIONAL BREAST BIOPSY         Family History   Problem Relation Age of Onset    Breast cancer Mother     Alcohol abuse Father     Coronary artery disease Father     Alcohol abuse Brother     Bipolar disorder Brother     Alcohol abuse Brother     No Known Problems Sister     No Known Problems Maternal Grandmother     No Known Problems Maternal Grandfather     No Known Problems Paternal Grandmother     No Known Problems Paternal Grandfather     No Known Problems Maternal Aunt     No Known Problems Maternal Aunt     Cancer Maternal Aunt     No Known Problems Paternal Aunt     No Known Problems Paternal Aunt          Medications have been verified  Objective   /82   Pulse 68   Temp 99 °F (37 2 °C)   Resp 20   Ht 5' 7" (1 702 m)   Wt 73 9 kg (163 lb)   SpO2 98%   BMI 25 53 kg/m²   No LMP recorded  Patient is postmenopausal        Physical Exam     Physical Exam  Constitutional:       Appearance: Normal appearance  She is normal weight  Neck:      Musculoskeletal: Normal range of motion  Cardiovascular:      Rate and Rhythm: Normal rate and regular rhythm  Pulses: Normal pulses  Pulmonary:      Effort: Pulmonary effort is normal    Neurological:      Mental Status: She is alert

## 2021-01-22 ENCOUNTER — LAB (OUTPATIENT)
Dept: LAB | Facility: CLINIC | Age: 66
End: 2021-01-22
Payer: MEDICARE

## 2021-01-22 DIAGNOSIS — E78.2 MIXED HYPERLIPIDEMIA: ICD-10-CM

## 2021-01-22 DIAGNOSIS — E55.9 VITAMIN D DEFICIENCY: ICD-10-CM

## 2021-01-22 LAB
25(OH)D3 SERPL-MCNC: 18.9 NG/ML (ref 30–100)
ALBUMIN SERPL BCP-MCNC: 3.9 G/DL (ref 3.5–5)
ALP SERPL-CCNC: 83 U/L (ref 46–116)
ALT SERPL W P-5'-P-CCNC: 29 U/L (ref 12–78)
ANION GAP SERPL CALCULATED.3IONS-SCNC: 3 MMOL/L (ref 4–13)
AST SERPL W P-5'-P-CCNC: 23 U/L (ref 5–45)
BILIRUB SERPL-MCNC: 0.53 MG/DL (ref 0.2–1)
BUN SERPL-MCNC: 11 MG/DL (ref 5–25)
CALCIUM SERPL-MCNC: 9 MG/DL (ref 8.3–10.1)
CHLORIDE SERPL-SCNC: 111 MMOL/L (ref 100–108)
CHOLEST SERPL-MCNC: 136 MG/DL (ref 50–200)
CO2 SERPL-SCNC: 29 MMOL/L (ref 21–32)
CREAT SERPL-MCNC: 0.56 MG/DL (ref 0.6–1.3)
GFR SERPL CREATININE-BSD FRML MDRD: 97 ML/MIN/1.73SQ M
GLUCOSE P FAST SERPL-MCNC: 91 MG/DL (ref 65–99)
HDLC SERPL-MCNC: 51 MG/DL
LDLC SERPL DIRECT ASSAY-MCNC: 73 MG/DL (ref 0–100)
POTASSIUM SERPL-SCNC: 4.5 MMOL/L (ref 3.5–5.3)
PROT SERPL-MCNC: 7.1 G/DL (ref 6.4–8.2)
SODIUM SERPL-SCNC: 143 MMOL/L (ref 136–145)

## 2021-01-22 PROCEDURE — 36415 COLL VENOUS BLD VENIPUNCTURE: CPT

## 2021-01-22 PROCEDURE — 82465 ASSAY BLD/SERUM CHOLESTEROL: CPT

## 2021-01-22 PROCEDURE — 82306 VITAMIN D 25 HYDROXY: CPT

## 2021-01-22 PROCEDURE — 83721 ASSAY OF BLOOD LIPOPROTEIN: CPT

## 2021-01-22 PROCEDURE — 83718 ASSAY OF LIPOPROTEIN: CPT

## 2021-01-22 PROCEDURE — 80053 COMPREHEN METABOLIC PANEL: CPT

## 2021-01-26 ENCOUNTER — OFFICE VISIT (OUTPATIENT)
Dept: FAMILY MEDICINE CLINIC | Facility: CLINIC | Age: 66
End: 2021-01-26
Payer: MEDICARE

## 2021-01-26 VITALS
HEIGHT: 67 IN | BODY MASS INDEX: 25.74 KG/M2 | HEART RATE: 68 BPM | WEIGHT: 164 LBS | TEMPERATURE: 98.6 F | SYSTOLIC BLOOD PRESSURE: 104 MMHG | DIASTOLIC BLOOD PRESSURE: 84 MMHG

## 2021-01-26 DIAGNOSIS — R55 SYNCOPE AND COLLAPSE: ICD-10-CM

## 2021-01-26 DIAGNOSIS — R77.8 ELEVATED TROPONIN: ICD-10-CM

## 2021-01-26 DIAGNOSIS — Z78.0 POST-MENOPAUSE: ICD-10-CM

## 2021-01-26 DIAGNOSIS — E78.2 MIXED HYPERLIPIDEMIA: ICD-10-CM

## 2021-01-26 DIAGNOSIS — E55.9 VITAMIN D DEFICIENCY: ICD-10-CM

## 2021-01-26 DIAGNOSIS — S22.41XA CLOSED FRACTURE OF MULTIPLE RIBS OF RIGHT SIDE, INITIAL ENCOUNTER: Primary | ICD-10-CM

## 2021-01-26 PROBLEM — S22.39XA RIB FRACTURE: Status: ACTIVE | Noted: 2021-01-26

## 2021-01-26 PROCEDURE — 99214 OFFICE O/P EST MOD 30 MIN: CPT | Performed by: FAMILY MEDICINE

## 2021-01-26 RX ORDER — MULTIVIT-MIN/IRON FUM/FOLIC AC 7.5 MG-4
1 TABLET ORAL DAILY
COMMUNITY

## 2021-01-26 RX ORDER — AMOXICILLIN 500 MG/1
CAPSULE ORAL
COMMUNITY
Start: 2021-01-18 | End: 2021-04-12

## 2021-01-26 NOTE — PATIENT INSTRUCTIONS
Problem List Items Addressed This Visit     Elevated troponin     Patient did have elevated troponin in the past   This troponin was slightly elevated on admission, but then decreased after a little while  She had stress test done a year ago, it was reasonable  No heart disease noted  Would recommend follow-up with Cardiology at some point  Hyperlipidemia     Cholesterol is doing quite well at this point  LDL is excellent, HDL is also quite good  Total cholesterol is fantastic  Continue on atorvastatin without change  Check in 6 months  Relevant Orders    Comprehensive metabolic panel    Lipid panel    Rib fracture - Primary     Patient did have rib fractures on CT  These can last up to 6 weeks  It is quite important to continue with deep breathing to encourage aeration in the lungs, leading to less chance of pneumonia  I would not recommend using a rib belt as that will increase the chances of pneumonia  As far as activity level, if something is too uncomfortable, I recommend that she deferred doing that until later  As far as pain control medications are concerned, she did have some oxycodone from the ER, but has tried to use it sparingly  I would concur with that  Use OTC's PRN  Oxy for severe pain  Syncope and collapse     I would recommend that she have Cardiac echo, holter, and carotid US  Follow after  If she chooses, she can get this order from [employer]  Relevant Orders    Holter monitor - 48 hour    Echo complete with contrast if indicated    VAS carotid complete study    Vitamin D deficiency     Vitamin-D level remains low at 18 9  Greater than 30 is normal   Would recommend increasing to 2000 units daily  She can also use multi vitamins for that as if they have vitamin-D at higher levels, and encouraged area as it does have vitamin-D   15-20 minutes of sun exposure daily would also be reasonable           Relevant Orders    Vitamin D 25 hydroxy Other Visit Diagnoses     Post-menopause        Relevant Orders    DXA bone density spine hip and pelvis          COVID 19 Instructions    Sarah Harden was advised to limit contact with others to essential tasks such as getting food, medications, and medical care  Proper handwashing reviewed, and Hand sanitzer when washing is not available  If the patient develops symptoms of COVID 19, the patient should call the office as soon as possible  For [id number] Flu season, it is strongly recommended that Flu Vaccinations be obtained  Please try to download [employer] Du[employer]  Once you do download this on your phone, you will be prompted to add your phone number to the account  After that, he should receive a text from [employer], and use that code to verify your phone number  After that, you should be able to use [employer] Duo to receive and make video calls  Please download Microsoft Teams to your phone or computer  We will be transitioning to this platform for Video Visits  Instructions for downloading this are available from the office  We are committed to getting you vaccinated as soon as possible and will be closely following CDC and [facility]  guidelines as they are released and revised  Please refer to our COVID-19 vaccine webpage for the most up to date information on the vaccine and our distribution efforts      Cb perry

## 2021-01-26 NOTE — ASSESSMENT & PLAN NOTE
I would recommend that she have Cardiac echo, holter, and carotid US  Follow after  If she chooses, she can get this order from [facility]

## 2021-01-26 NOTE — ASSESSMENT & PLAN NOTE
Patient did have elevated troponin in the past   This troponin was slightly elevated on admission, but then decreased after a little while  She had stress test done a year ago, it was reasonable  No heart disease noted  Would recommend follow-up with Cardiology at some point

## 2021-01-26 NOTE — ASSESSMENT & PLAN NOTE
Cholesterol is doing quite well at this point  LDL is excellent, HDL is also quite good  Total cholesterol is fantastic  Continue on atorvastatin without change  Check in 6 months

## 2021-01-26 NOTE — ASSESSMENT & PLAN NOTE
Vitamin-D level remains low at 18 9  Greater than 30 is normal   Would recommend increasing to 2000 units daily  She can also use multi vitamins for that as if they have vitamin-D at higher levels, and encouraged area as it does have vitamin-D   15-20 minutes of sun exposure daily would also be reasonable

## 2021-01-26 NOTE — ASSESSMENT & PLAN NOTE
Patient did have rib fractures on CT  These can last up to 6 weeks  It is quite important to continue with deep breathing to encourage aeration in the lungs, leading to less chance of pneumonia  I would not recommend using a rib belt as that will increase the chances of pneumonia  As far as activity level, if something is too uncomfortable, I recommend that she deferred doing that until later  As far as pain control medications are concerned, she did have some oxycodone from the ER, but has tried to use it sparingly  I would concur with that  Use OTC's PRN  Oxy for severe pain

## 2021-01-26 NOTE — PROGRESS NOTES
Assessment and Plan:    Problem List Items Addressed This Visit     Elevated troponin     Patient did have elevated troponin in the past   This troponin was slightly elevated on admission, but then decreased after a little while  She had stress test done a year ago, it was reasonable  No heart disease noted  Would recommend follow-up with Cardiology at some point  Hyperlipidemia     Cholesterol is doing quite well at this point  LDL is excellent, HDL is also quite good  Total cholesterol is fantastic  Continue on atorvastatin without change  Check in 6 months  Relevant Orders    Comprehensive metabolic panel    Lipid panel    Rib fracture - Primary     Patient did have rib fractures on CT  These can last up to 6 weeks  It is quite important to continue with deep breathing to encourage aeration in the lungs, leading to less chance of pneumonia  I would not recommend using a rib belt as that will increase the chances of pneumonia  As far as activity level, if something is too uncomfortable, I recommend that she deferred doing that until later  As far as pain control medications are concerned, she did have some oxycodone from the ER, but has tried to use it sparingly  I would concur with that  Use OTC's PRN  Oxy for severe pain  Syncope and collapse     I would recommend that she have Cardiac echo, holter, and carotid US  Follow after  If she chooses, she can get this order from [employer]  Relevant Orders    Holter monitor - 48 hour    Echo complete with contrast if indicated    VAS carotid complete study    Vitamin D deficiency     Vitamin-D level remains low at 18 9  Greater than 30 is normal   Would recommend increasing to 2000 units daily  She can also use multi vitamins for that as if they have vitamin-D at higher levels, and encouraged area as it does have vitamin-D   15-20 minutes of sun exposure daily would also be reasonable           Relevant Orders Vitamin D 25 hydroxy      Other Visit Diagnoses     Post-menopause        Relevant Orders    DXA bone density spine hip and pelvis                 Diagnoses and all orders for this visit:    Closed fracture of multiple ribs of right side, initial encounter    Syncope and collapse  -     Holter monitor - 48 hour; Future  -     Echo complete with contrast if indicated; Future  -     VAS carotid complete study; Future    Elevated troponin    Post-menopause  -     DXA bone density spine hip and pelvis; Future    Mixed hyperlipidemia  -     Comprehensive metabolic panel; Future  -     Lipid panel; Future    Vitamin D deficiency  -     Vitamin D 25 hydroxy; Future    Other orders  -     amoxicillin (AMOXIL) 500 mg capsule; TAKE 1 CAPSULE 3 TIMES A DAY FOR 7 TO 10 DAYS  -     Multiple Vitamins-Minerals (multivitamin with minerals) tablet; Take 1 tablet by mouth daily              Subjective:      Patient ID: Faye Ayers is a 77 y o  female  CC:    Chief Complaint   Patient presents with    Follow-up     Follow up from a fall and ER visit  c/o continues with B/L rib pain  mjs       HPI:    Patient had eval in hospital for syncope and fall  She did have rib fx  The labs found Troponin elevation, and that was OK after,as it went down  She does have some right side rib pain  Patient reports she did have some sleep walking before  Was surrounding the chest just below breasts  She wondered about activity level with this  She has also had several dental issues and needs oral surgery  With regard to the fall, patient was in bed, woke up look at her phone, and then went to go to the bathroom  Next she remembers being on the floor  She does not know if she tripped over her dog, or had other issues that happened  Her  did not witness the fall, but went to her immediately afterwards as he heard her     does not recall any seizure-like activity, at least that was not reported to the emergency room staff  Patient did have a bruise on the right side of the lower jaw at the same time  The following portions of the patient's history were reviewed and updated as appropriate: allergies, current medications, past family history, past medical history, past social history, past surgical history and problem list       Review of Systems   Constitutional: Negative  HENT: Negative  Eyes: Negative  Respiratory: Negative  Cardiovascular: Negative  Gastrointestinal: Negative  Endocrine: Negative  Genitourinary: Negative  Musculoskeletal: Negative  Right-sided chest wall pain at site fracture, and some left-sided chest wall pain   Skin: Negative  Allergic/Immunologic: Negative  Neurological: Negative  Hematological: Negative  Psychiatric/Behavioral: Negative  Data to review:   [facility]  CT chest/abd/pelvis  CXR  CT Maxilofacial    Sodium 143, potassium 4 5, calcium 9 0  Blood sugar 91  AST 23, ALT 29  Creatinine 0 56, GFR:  97  Total cholesterol 136, LDL 73  HDL 51  Vitamine D 18 9  Objective:    Vitals:    01/26/21 1303   BP: 104/84   Pulse: 68   Temp: 98 6 °F (37 °C)   Weight: 74 4 kg (164 lb)   Height: 5' 7" (1 702 m)        Physical Exam  Vitals signs and nursing note reviewed  Constitutional:       Appearance: Normal appearance  HENT:      Head: Normocephalic  Cardiovascular:      Rate and Rhythm: Normal rate and regular rhythm  Pulses: Normal pulses  Carotid pulses are 2+ on the right side and 2+ on the left side  Heart sounds: Normal heart sounds  No murmur  No friction rub  No gallop  Pulmonary:      Effort: Pulmonary effort is normal  No respiratory distress  Breath sounds: Normal breath sounds  No stridor  No wheezing, rhonchi or rales  Chest:      Chest wall: No tenderness  Musculoskeletal:        Arms:    Neurological:      Mental Status: She is alert  BMI Counseling:  Body mass index is 25 69 kg/m²  The BMI is above normal  Nutrition recommendations include decreasing portion sizes, encouraging healthy choices of fruits and vegetables, decreasing fast food intake, consuming healthier snacks, limiting drinks that contain sugar, moderation in carbohydrate intake, increasing intake of lean protein, reducing intake of saturated and trans fat and reducing intake of cholesterol  Exercise recommendations include exercising 3-5 times per week  No pharmacotherapy was ordered

## 2021-03-02 ENCOUNTER — HOSPITAL ENCOUNTER (OUTPATIENT)
Dept: BONE DENSITY | Facility: MEDICAL CENTER | Age: 66
Discharge: HOME | End: 2021-03-02
Payer: MEDICARE

## 2021-03-02 DIAGNOSIS — Z78.0 POST-MENOPAUSE: ICD-10-CM

## 2021-03-02 PROCEDURE — 77080 DXA BONE DENSITY AXIAL: CPT

## 2021-03-08 ENCOUNTER — HOSPITAL ENCOUNTER (OUTPATIENT)
Dept: NON INVASIVE DIAGNOSTICS | Facility: HOSPITAL | Age: 66
Discharge: HOME | End: 2021-03-08
Payer: MEDICARE

## 2021-03-08 DIAGNOSIS — R55 SYNCOPE AND COLLAPSE: ICD-10-CM

## 2021-03-08 PROCEDURE — 93880 EXTRACRANIAL BILAT STUDY: CPT

## 2021-03-08 PROCEDURE — 93880 EXTRACRANIAL BILAT STUDY: CPT | Performed by: SURGERY

## 2021-04-09 ENCOUNTER — TELEPHONE (OUTPATIENT)
Dept: FAMILY MEDICINE CLINIC | Facility: CLINIC | Age: 66
End: 2021-04-09

## 2021-04-09 ENCOUNTER — TELEMEDICINE (OUTPATIENT)
Dept: FAMILY MEDICINE CLINIC | Facility: CLINIC | Age: 66
End: 2021-04-09
Payer: MEDICARE

## 2021-04-09 DIAGNOSIS — J01.91 ACUTE RECURRENT SINUSITIS, UNSPECIFIED LOCATION: Primary | ICD-10-CM

## 2021-04-09 DIAGNOSIS — J01.91 ACUTE RECURRENT SINUSITIS, UNSPECIFIED LOCATION: ICD-10-CM

## 2021-04-09 PROCEDURE — 99442 PR PHYS/QHP TELEPHONE EVALUATION 11-20 MIN: CPT | Performed by: NURSE PRACTITIONER

## 2021-04-09 PROCEDURE — U0003 INFECTIOUS AGENT DETECTION BY NUCLEIC ACID (DNA OR RNA); SEVERE ACUTE RESPIRATORY SYNDROME CORONAVIRUS 2 (SARS-COV-2) (CORONAVIRUS DISEASE [COVID-19]), AMPLIFIED PROBE TECHNIQUE, MAKING USE OF HIGH THROUGHPUT TECHNOLOGIES AS DESCRIBED BY CMS-2020-01-R: HCPCS | Performed by: NURSE PRACTITIONER

## 2021-04-09 PROCEDURE — U0005 INFEC AGEN DETEC AMPLI PROBE: HCPCS | Performed by: NURSE PRACTITIONER

## 2021-04-09 RX ORDER — PREDNISONE 10 MG/1
TABLET ORAL
Qty: 30 TABLET | Refills: 0 | Status: SHIPPED | OUTPATIENT
Start: 2021-04-09 | End: 2021-05-07

## 2021-04-09 NOTE — TELEPHONE ENCOUNTER
I called and lmom for pt that Dr Dipak Vargas is in agreement with [facility] and no antibiotics will be given until a negative COVID test  I advised that is she chooses not to have the testing done that is up to her but if this indeed is a sinus infection it will resolve in time without a antibiotic but it is her choice

## 2021-04-09 NOTE — PROGRESS NOTES
COVID-19 Outpatient Progress Note    Assessment/Plan:    Problem List Items Addressed This Visit        Respiratory    Acute recurrent sinusitis - Primary     Prednisone taper ordered  Patient also advised to continue to use Flonase daily  Patient refused COVID testing  If symptoms persist over the next 3 days after beginning prednisone taper may consider antibiotic therapy  Relevant Medications    predniSONE 10 mg tablet    Other Relevant Orders    Novel Coronavirus (COVID-19), PCR [facility] Collected at [place] [address] or [facility]         Disposition:     Patient refused COVID testing  I have spent 15 minutes directly with the patient  Encounter provider BRIAN Bess    Provider located at [address] [place] [address] PRIMARY CARE  [facility] [address]    Recent Visits  No visits were found meeting these conditions  Showing recent visits within past 7 days and meeting all other requirements     Today's Visits  Date Type Provider Dept   04/09/21 Telephone Nitesh Nicholson  [address] Primary Care   04/09/21 Telemedicine Yovani Chen [facility] Primary Care   Showing today's visits and meeting all other requirements     Future Appointments  No visits were found meeting these conditions  Showing future appointments within next 150 days and meeting all other requirements        Patient agrees to participate in a virtual check in via telephone or video visit instead of presenting to the office to address urgent/immediate medical needs  Patient is aware this is a billable service  After connecting through Telephone, the patient was identified by name and date of birth  Emigdio Caceres was informed that this was a telemedicine visit and that the exam was being conducted confidentially over secure lines  My office door was closed  No one else was in the room  Emigdio Caceres acknowledged consent and understanding of privacy and security of the telemedicine visit  I informed the patient that I have reviewed her record in Epic and presented the opportunity for her to ask any questions regarding the visit today  The patient agreed to participate  It was my intent to perform this visit via video technology but the patient was not able to do a video connection so the visit was completed via audio telephone only  Subjective:   Perico Gardnier is a 77 y o  female who is concerned about COVID-19  Patient's symptoms include nasal congestion, rhinorrhea, sore throat, cough (non-productive ) and headache (intermittent)  Patient denies fever, chills, fatigue, malaise, anosmia, loss of taste, shortness of breath, chest tightness, abdominal pain, nausea, vomiting, diarrhea and myalgias  Date of symptom onset: 4/5/2021    Exposure:   Contact with a person who is under investigation (PUI) for or who is positive for COVID-19 within the last 14 days?: No    Hospitalized recently for fever and/or lower respiratory symptoms?: No      Currently a healthcare worker that is involved in direct patient care?: No      Works in a special setting where the risk of COVID-19 transmission may be high? (this may include long-term care, correctional and retirement facilities; homeless shelters; assisted-living facilities and group homes ): No      Resident in a special setting where the risk of COVID-19 transmission may be high? (this may include long-term care, correctional and retirement facilities; homeless shelters; assisted-living facilities and group homes ): No      Patient reports bilateral otalgia, swollen glands, sinus pressure, intermittent nonproductive cough, rhinorrhea, nasal congestion, sore throat, intermittent headaches  Patient denies any fevers or shortness of breath    I strongly encouraged the patient to be tested for COVID however, she adamantly refused as she stated that she had has a "sinus infection" and that she gets 1 of these "every year " I informed patient that it is possible to have COVID while having symptoms of a sinus infection or having COVID while also having a concomitant sinusitis  However, the patient still refused to be tested for COVID  Therefore, I informed the patient that I will prescribe her a prednisone taper but I will not prescribe antibiotics today  Patient was advised that if she still has symptoms on 04/12/2021 after being on the prednisone taper for the next 3 days she should recontact the office  If she is still refusing to be tested for COVID at this time I will consider antibiotic treatment depending on the severity of her symptoms  Lab Results   Component Value Date    SARSCOV2 Not Detected 04/20/2020     Past Medical History:   Diagnosis Date    Uncomplicated alcohol abuse      Past Surgical History:   Procedure Laterality Date    BREAST BIOPSY Right     benign    INCISIONAL BREAST BIOPSY       Current Outpatient Medications   Medication Sig Dispense Refill    amoxicillin (AMOXIL) 500 mg capsule TAKE 1 CAPSULE 3 TIMES A DAY FOR 7 TO 10 DAYS      aspirin 81 mg chewable tablet Chew 81 mg daily      atorvastatin (LIPITOR) 40 mg tablet Take 40 mg by mouth daily      cholecalciferol (VITAMIN D3) 1,000 units tablet Take 1 tablet (1,000 Units total) by mouth daily 30 tablet 11    escitalopram (LEXAPRO) 10 mg tablet Take 1 tablet (10 mg total) by mouth daily 90 tablet 1    fluticasone (FLONASE) 50 mcg/act nasal spray 2 sprays into each nostril daily      montelukast (SINGULAIR) 10 mg tablet Take 1 tablet (10 mg total) by mouth daily at bedtime 30 tablet 5    Multiple Vitamins-Minerals (multivitamin with minerals) tablet Take 1 tablet by mouth daily      predniSONE 10 mg tablet Use 5 tablets for 2 days  Use 4 tablets for 2 days  Use 3 tablets for 2 days  Use 2 tablets for 2 days  Use 1 tablet for 2 days  30 tablet 0     No current facility-administered medications for this visit        Allergies   Allergen Reactions    Alcohol - Food Allergy Confusion and Delirium     In Recovery    Pravastatin Other (See Comments)     Felt ill    Zithromax [Azithromycin] Diarrhea and Rash       Review of Systems   Constitutional: Negative for chills, fatigue and fever  HENT: Positive for congestion, rhinorrhea, sinus pressure and sore throat  Negative for sinus pain  Eyes: Negative  Respiratory: Positive for cough (non-productive )  Negative for chest tightness and shortness of breath  Cardiovascular: Negative for chest pain and palpitations  Gastrointestinal: Negative for abdominal pain, diarrhea, nausea and vomiting  Endocrine: Negative  Genitourinary: Negative  Musculoskeletal: Negative for myalgias  Skin: Negative  Allergic/Immunologic: Negative  Neurological: Positive for headaches (intermittent)  Hematological: Negative  Psychiatric/Behavioral: Negative  Objective: There were no vitals filed for this visit  Physical Exam  Vitals reviewed: limited due to phone only exam    Neurological:      Mental Status: She is alert  VIRTUAL VISIT DISCLAIMER    Harpal Butt acknowledges that she has consented to an online visit or consultation  She understands that the online visit is based solely on information provided by her, and that, in the absence of a face-to-face physical evaluation by the physician, the diagnosis she receives is both limited and provisional in terms of accuracy and completeness  This is not intended to replace a full medical face-to-face evaluation by the physician  Harpal Butt understands and accepts these terms

## 2021-04-09 NOTE — TELEPHONE ENCOUNTER
Pt called in because she is upset because [facility] sent her for a Covid test and prescribed her prednisone  She will like antibiotics called in because she is not going for a covid test because she gets this every year   She also feels Joshua Jeffers is not a doctor and is not qualify to know the difference between covid and sinus infection

## 2021-04-09 NOTE — ASSESSMENT & PLAN NOTE
Prednisone taper ordered  Patient also advised to continue to use Flonase daily  Patient refused COVID testing  If symptoms persist over the next 3 days after beginning prednisone taper may consider antibiotic therapy

## 2021-04-09 NOTE — PATIENT INSTRUCTIONS
Problem List Items Addressed This Visit        Respiratory    Acute recurrent sinusitis - Primary     Prednisone taper ordered  Patient also advised to continue to use Flonase daily  Patient refused COVID testing  If symptoms persist over the next 3 days after beginning prednisone taper may consider antibiotic therapy  Relevant Medications    predniSONE 10 mg tablet        COVID-19 Home Care Guidelines    Your healthcare provider and/or public health staff have evaluated you and have determined that you do not need to remain in the hospital at this time  At this time you can be isolated at home where you will be monitored by staff from your local or state health department  You should carefully follow the prevention and isolation steps below until a healthcare provider or local or state health department says that you can return to your normal activities  Stay home except to get medical care    People who are mildly ill with COVID-19 are able to isolate at home during their illness  You should restrict activities outside your home, except for getting medical care  Do not go to work, school, or public areas  Avoid using public transportation, ride-sharing, or taxis  Separate yourself from other people and animals in your home    People: As much as possible, you should stay in a specific room and away from other people in your home  Also, you should use a separate bathroom, if available  Animals: You should restrict contact with pets and other animals while you are sick with COVID-19, just like you would around other people  Although there have not been reports of pets or other animals becoming sick with COVID-19, it is still recommended that people sick with COVID-19 limit contact with animals until more information is known about the virus  When possible, have another member of your household care for your animals while you are sick   If you are sick with COVID-19, avoid contact with your pet, including petting, snuggling, being kissed or licked, and sharing food  If you must care for your pet or be around animals while you are sick, wash your hands before and after you interact with pets and wear a facemask  See COVID-19 and Animals for more information  Call ahead before visiting your doctor    If you have a medical appointment, call the healthcare provider and tell them that you have or may have COVID-19  This will help the healthcare providers office take steps to keep other people from getting infected or exposed  Wear a facemask    You should wear a facemask when you are around other people (e g , sharing a room or vehicle) or pets and before you enter a healthcare providers office  If you are not able to wear a facemask (for example, because it causes trouble breathing), then people who live with you should not stay in the same room with you, or they should wear a facemask if they enter your room  Cover your coughs and sneezes    Cover your mouth and nose with a tissue when you cough or sneeze  Throw used tissues in a lined trash can  Immediately wash your hands with soap and water for at least 20 seconds or, if soap and water are not available, clean your hands with an alcohol-based hand  that contains at least 60% alcohol  Clean your hands often    Wash your hands often with soap and water for at least 20 seconds, especially after blowing your nose, coughing, or sneezing; going to the bathroom; and before eating or preparing food  If soap and water are not readily available, use an alcohol-based hand  with at least 60% alcohol, covering all surfaces of your hands and rubbing them together until they feel dry  Soap and water are the best option if hands are visibly dirty  Avoid touching your eyes, nose, and mouth with unwashed hands      Avoid sharing personal household items    You should not share dishes, drinking glasses, cups, eating utensils, towels, or bedding with other people or pets in your home  After using these items, they should be washed thoroughly with soap and water  Clean all high-touch surfaces everyday    High touch surfaces include counters, tabletops, doorknobs, bathroom fixtures, toilets, phones, keyboards, tablets, and bedside tables  Also, clean any surfaces that may have blood, stool, or body fluids on them  Use a household cleaning spray or wipe, according to the label instructions  Labels contain instructions for safe and effective use of the cleaning product including precautions you should take when applying the product, such as wearing gloves and making sure you have good ventilation during use of the product  Monitor your symptoms    Seek prompt medical attention if your illness is worsening (e g , difficulty breathing)  Before seeking care, call your healthcare provider and tell them that you have, or are being evaluated for, COVID-19  Put on a facemask before you enter the facility  These steps will help the healthcare providers office to keep other people in the office or waiting room from getting infected or exposed  Ask your healthcare provider to call the local or [facility] health department  Persons who are placed under active monitoring or facilitated self-monitoring should follow instructions provided by their local health department or occupational health professionals, as appropriate  If you have a medical emergency and need to call 911, notify the dispatch personnel that you have, or are being evaluated for COVID-19  If possible, put on a facemask before emergency medical services arrive      Discontinuing home isolation    Patients with confirmed COVID-19 should remain under home isolation precautions until the following conditions are met:   - They have had no fever for at least 24 hours (that is one full day of no fever without the use medicine that reduces fevers)  AND  - other symptoms have improved (for example, when their cough or shortness of breath have improved)  AND  - If had mild or moderate illness, at least 10 days have passed since their symptoms first appeared or if severe illness (needed oxygen) or immunosuppressed, at least 20 days have passed since symptoms first appeared  Patients with confirmed COVID-19 should also notify close contacts (including their workplace) and ask that they self-quarantine  Currently, close contact is defined as being within 6 feet for 15 minutes or more from the period 24 hours starting 48 hours before symptom onset to the time at which the patient went into isolation  Close contacts of patients diagnosed with COVID-19 should be instructed by the patient to self-quarantine for 14 days from the last time of their last contact with the patient       Source: [employer]     TENT COVID TESTING SITES FOR SURGICAL/PROCEDURAL PATIENTS     [facility]  Address  Hours   Monday-Friday Saturday Hours    [facility]  [address]  8am-5pm  CLOSED    [facility]  [address] [place] [address] [facility]  8am-5pm  CLOSED      [id number]

## 2021-04-10 LAB — SARS-COV-2 RNA RESP QL NAA+PROBE: NEGATIVE

## 2021-04-12 DIAGNOSIS — J01.91 ACUTE RECURRENT SINUSITIS, UNSPECIFIED LOCATION: Primary | ICD-10-CM

## 2021-04-12 RX ORDER — AMOXICILLIN AND CLAVULANATE POTASSIUM 875; 125 MG/1; MG/1
1 TABLET, FILM COATED ORAL EVERY 12 HOURS SCHEDULED
Qty: 20 TABLET | Refills: 0 | Status: SHIPPED | OUTPATIENT
Start: 2021-04-12 | End: 2021-04-22

## 2021-04-30 ENCOUNTER — HOSPITAL ENCOUNTER (OUTPATIENT)
Dept: NON INVASIVE DIAGNOSTICS | Facility: HOSPITAL | Age: 66
Discharge: HOME | End: 2021-04-30
Payer: MEDICARE

## 2021-04-30 DIAGNOSIS — R55 SYNCOPE AND COLLAPSE: ICD-10-CM

## 2021-04-30 PROCEDURE — 93306 TTE W/DOPPLER COMPLETE: CPT | Performed by: INTERNAL MEDICINE

## 2021-04-30 PROCEDURE — 93306 TTE W/DOPPLER COMPLETE: CPT

## 2021-05-07 ENCOUNTER — OFFICE VISIT (OUTPATIENT)
Dept: FAMILY MEDICINE CLINIC | Facility: CLINIC | Age: 66
End: 2021-05-07
Payer: MEDICARE

## 2021-05-07 VITALS
BODY MASS INDEX: 25.74 KG/M2 | HEIGHT: 67 IN | SYSTOLIC BLOOD PRESSURE: 122 MMHG | TEMPERATURE: 97.6 F | RESPIRATION RATE: 18 BRPM | OXYGEN SATURATION: 98 % | WEIGHT: 164 LBS | DIASTOLIC BLOOD PRESSURE: 70 MMHG | HEART RATE: 56 BPM

## 2021-05-07 DIAGNOSIS — E78.2 MIXED HYPERLIPIDEMIA: ICD-10-CM

## 2021-05-07 DIAGNOSIS — I65.22 STENOSIS OF LEFT CAROTID ARTERY: ICD-10-CM

## 2021-05-07 DIAGNOSIS — Z20.822 EXPOSURE TO COVID-19 VIRUS: ICD-10-CM

## 2021-05-07 DIAGNOSIS — I38 HEART VALVE REGURGITATION: ICD-10-CM

## 2021-05-07 DIAGNOSIS — R55 SYNCOPE AND COLLAPSE: Primary | ICD-10-CM

## 2021-05-07 DIAGNOSIS — R77.8 ELEVATED TROPONIN: ICD-10-CM

## 2021-05-07 DIAGNOSIS — F31.9 BIPOLAR 1 DISORDER (HCC): ICD-10-CM

## 2021-05-07 PROBLEM — I65.29 CAROTID STENOSIS: Status: ACTIVE | Noted: 2021-05-07

## 2021-05-07 PROCEDURE — 99214 OFFICE O/P EST MOD 30 MIN: CPT | Performed by: FAMILY MEDICINE

## 2021-05-07 RX ORDER — ACETAMINOPHEN AND CODEINE PHOSPHATE 300; 30 MG/1; MG/1
TABLET ORAL
COMMUNITY
Start: 2021-05-05 | End: 2021-05-07

## 2021-05-07 RX ORDER — ATORVASTATIN CALCIUM 10 MG/1
10 TABLET, FILM COATED ORAL DAILY
COMMUNITY
End: 2021-09-20 | Stop reason: SDUPTHER

## 2021-05-07 RX ORDER — AMOXICILLIN 500 MG/1
TABLET, FILM COATED ORAL
COMMUNITY
Start: 2021-05-05 | End: 2021-08-23 | Stop reason: ALTCHOICE

## 2021-05-07 RX ORDER — ESTRADIOL 0.1 MG/G
2 CREAM VAGINAL
COMMUNITY

## 2021-05-07 NOTE — ASSESSMENT & PLAN NOTE
Based on the minor changes on the carotid artery ultrasound, I will order 1 for next year, but she could wait 2 years for this  Was less than 50% stenosis

## 2021-05-07 NOTE — ASSESSMENT & PLAN NOTE
Patient did have an episode of syncope and collapse  At this point, she seems to be doing quite well  Carotid artery ultrasound did show 50% stenosis or less on the left, but the right was normal   Cardiac echo did not find any specific abnormalities, other than some minor regurgitation  Would recommend follow-up with Cardiology, as there is no specific findings at this time

## 2021-05-07 NOTE — PROGRESS NOTES
Assessment and Plan:    Problem List Items Addressed This Visit     Bipolar 1 disorder ([address] )     Continues on Lexapro  No change  Carotid stenosis     Based on the minor changes on the carotid artery ultrasound, I will order 1 for next year, but she could wait 2 years for this  Was less than 50% stenosis  Relevant Orders    VAS carotid complete study    Elevated troponin     The troponin was elevated  I would recommend follow-up with cardiology based on this  She had elevated previously as well, but it does not seem to be any specific pattern to it  She is not having any symptoms or problems, therefore I would elect not to send her to the emergency room based on this  Again, outpatient follow-up with Cardiology would be the most appropriate  Heart valve regurgitation     Mild MR, TR, VA  No problems noted  Hyperlipidemia     Patient does have hyperlipidemia  She is on atorvastatin  I would recommend that she continue that, and recheck her labs as scheduled  There is blood work ordered in the system for July  Relevant Medications    atorvastatin (LIPITOR) 10 mg tablet    Syncope and collapse - Primary     Patient did have an episode of syncope and collapse  At this point, she seems to be doing quite well  Carotid artery ultrasound did show 50% stenosis or less on the left, but the right was normal   Cardiac echo did not find any specific abnormalities, other than some minor regurgitation  Would recommend follow-up with Cardiology, as there is no specific findings at this time  Other Visit Diagnoses     Exposure to COVID-19 virus        She requested COVID antibody test  Ordered  I woud recommend she review vaccines, she reports she has, and did not want vaccine      Relevant Orders    SARS-CoV2 Antibody, Total (IgG, IgA, IgM) [facility]                 Diagnoses and all orders for this visit:    Syncope and collapse    Mixed hyperlipidemia    Elevated troponin    Bipolar 1 disorder ([facility] [address] )    Exposure to COVID-19 virus  Comments:  She requested COVID antibody test  Ordered  I woud recommend she review vaccines, she reports she has, and did not want vaccine  Orders:  -     SARS-CoV2 Antibody, Total (IgG, IgA, IgM) [facility]- Lab Collect; Future    Heart valve regurgitation    Stenosis of left carotid artery  -     VAS carotid complete study; Future    Other orders  -     Discontinue: acetaminophen-codeine (TYLENOL #3) 300-30 mg per tablet; TAKEN AS NEEDED- DENTAL SURGERY- IMPLANT  -     estradiol (ESTRACE) 0 1 mg/g vaginal cream; Insert 2 g into the vagina  -     amoxicillin (AMOXIL) 500 MG tablet  -     atorvastatin (LIPITOR) 10 mg tablet; Take 10 mg by mouth daily              Subjective:      Patient ID: Jaret Nichols is a 77 y o  female  CC:    Chief Complaint   Patient presents with    Follow-up     labs done 1/26/21- DXA- 3/2/21 VAS- 3/8/21       HPI:    She is here to review with me about the issues with her recent labs and carotid echo  She did discuss sinus infection  She was OK until recently  She did have neg COVID test  She feels well currently  Her current sinus infection seems to be doing quite well at this point  She is, however, on antibiotics due to dental   She is not specifically having any issues with her sinuses, however  She also wondered about antibody testing for COVID  She feels she may have had it in the past     Reviewed cardiac echo  Reviewed carotid artery ultrasound  Reviewed labs from [facility] at [facility], including troponin level that was elevated  Patient does follow with Cardiology at [facility]  This is because of the chest pains, troponin, syncope              The following portions of the patient's history were reviewed and updated as appropriate: allergies, current medications, past family history, past medical history, past social history, past surgical history and problem list       Review of Systems   Constitutional: Negative  HENT: Negative  Eyes: Negative  Respiratory: Negative  Cardiovascular: Negative  Gastrointestinal: Negative  Endocrine: Negative  Genitourinary: Negative  Musculoskeletal: Negative  Skin: Negative  Allergic/Immunologic: Negative  Neurological: Negative  Hematological: Negative  Psychiatric/Behavioral: Negative  Data to review:   Carotid art US: Right normal  Left less than 50% stenosis  Cardiac echo:  Minor regurgitation and several valves  Normal ejection fraction  Objective:    Vitals:    05/07/21 0958   BP: 122/70   BP Location: Left arm   Patient Position: Sitting   Cuff Size: Standard   Pulse: 56   Resp: 18   Temp: 97 6 °F (36 4 °C)   TempSrc: Tympanic   SpO2: 98%   Weight: 74 4 kg (164 lb)   Height: 5' 7" (1 702 m)        Physical Exam  Vitals signs and nursing note reviewed  Constitutional:       Appearance: Normal appearance  HENT:      Head: Normocephalic  Cardiovascular:      Rate and Rhythm: Normal rate and regular rhythm  Pulses: Normal pulses  Carotid pulses are 2+ on the right side and 2+ on the left side  Heart sounds: Normal heart sounds  No murmur  No friction rub  No gallop  Pulmonary:      Effort: Pulmonary effort is normal  No respiratory distress  Breath sounds: Normal breath sounds  No stridor  No wheezing, rhonchi or rales  Chest:      Chest wall: No tenderness  Neurological:      Mental Status: She is alert

## 2021-05-07 NOTE — ASSESSMENT & PLAN NOTE
Patient does have hyperlipidemia  She is on atorvastatin  I would recommend that she continue that, and recheck her labs as scheduled  There is blood work ordered in the system for July

## 2021-05-07 NOTE — PATIENT INSTRUCTIONS
Problem List Items Addressed This Visit     Bipolar 1 disorder ([address] )     Continues on Lexapro  No change  Carotid stenosis     Based on the minor changes on the carotid artery ultrasound, I will order 1 for next year, but she could wait 2 years for this  Was less than 50% stenosis  Relevant Orders    VAS carotid complete study    Elevated troponin     The troponin was elevated  I would recommend follow-up with cardiology based on this  She had elevated previously as well, but it does not seem to be any specific pattern to it  She is not having any symptoms or problems, therefore I would elect not to send her to the emergency room based on this  Again, outpatient follow-up with Cardiology would be the most appropriate  Heart valve regurgitation     Mild MR, TR, ID  No problems noted  Hyperlipidemia     Patient does have hyperlipidemia  She is on atorvastatin  I would recommend that she continue that, and recheck her labs as scheduled  There is blood work ordered in the system for July  Relevant Medications    atorvastatin (LIPITOR) 10 mg tablet    Syncope and collapse - Primary     Patient did have an episode of syncope and collapse  At this point, she seems to be doing quite well  Carotid artery ultrasound did show 50% stenosis or less on the left, but the right was normal   Cardiac echo did not find any specific abnormalities, other than some minor regurgitation  Would recommend follow-up with Cardiology, as there is no specific findings at this time  Other Visit Diagnoses     Exposure to COVID-19 virus        She requested COVID antibody test  Ordered  I woud recommend she review vaccines, she reports she has, and did not want vaccine      Relevant Orders    SARS-CoV2 Antibody, Total (IgG, IgA, IgM) [facility]          COVID 19 Instructions    Rossi Soares was advised to limit contact with others to essential tasks such as getting food, medications, and medical care  Proper handwashing reviewed, and Hand sanitzer when washing is not available  If the patient develops symptoms of COVID 19, the patient should call the office as soon as possible  For 9738-7780 Flu season, it is strongly recommended that Flu Vaccinations be obtained  Virtual Visits may be conducted in several ways: Yue: You should get a text message when the provider is ready to see you  Click on the link in the text message, and the call should start  You will need to type in your name, and allow camera and microphone access  This is HIPPA compliant, and secure  Please try to download Google Duo  Once you do download this on your phone, you will be prompted to add your phone number to the account  After that, he should receive a text from [employer], and use that code to verify your phone number  After that, you should be able to use Google Duo to receive and make video calls  Please download Microsoft Teams to your phone or computer  You would get an email with the meeting after scheduling with the office  You will Join the meeting, and wait there till the provider joins as well  Instructions for downloading this are available from the office  This is HIPPA compliant, and secure  We are committed to getting you vaccinated as soon as possible and will be closely following CDC and [facility]  guidelines as they are released and revised  Please refer to our COVID-19 vaccine webpage for the most up to date information on the vaccine and our distribution efforts      Cb perry

## 2021-05-07 NOTE — ASSESSMENT & PLAN NOTE
The troponin was elevated  I would recommend follow-up with cardiology based on this  She had elevated previously as well, but it does not seem to be any specific pattern to it  She is not having any symptoms or problems, therefore I would elect not to send her to the emergency room based on this  Again, outpatient follow-up with Cardiology would be the most appropriate

## 2021-07-08 DIAGNOSIS — F31.9 BIPOLAR 1 DISORDER (HCC): ICD-10-CM

## 2021-07-08 RX ORDER — ESCITALOPRAM OXALATE 10 MG/1
TABLET ORAL
Qty: 90 TABLET | Refills: 1 | Status: SHIPPED | OUTPATIENT
Start: 2021-07-08 | End: 2022-03-14

## 2021-07-26 ENCOUNTER — APPOINTMENT (OUTPATIENT)
Dept: LAB | Facility: CLINIC | Age: 66
End: 2021-07-26
Payer: MEDICARE

## 2021-07-26 DIAGNOSIS — E78.2 MIXED HYPERLIPIDEMIA: ICD-10-CM

## 2021-07-26 DIAGNOSIS — Z20.822 EXPOSURE TO COVID-19 VIRUS: ICD-10-CM

## 2021-07-26 DIAGNOSIS — E55.9 VITAMIN D DEFICIENCY: ICD-10-CM

## 2021-07-26 LAB
25(OH)D3 SERPL-MCNC: 18.9 NG/ML (ref 30–100)
ALBUMIN SERPL BCP-MCNC: 4 G/DL (ref 3.5–5)
ALP SERPL-CCNC: 62 U/L (ref 46–116)
ALT SERPL W P-5'-P-CCNC: 36 U/L (ref 12–78)
ANION GAP SERPL CALCULATED.3IONS-SCNC: 6 MMOL/L (ref 4–13)
AST SERPL W P-5'-P-CCNC: 21 U/L (ref 5–45)
BILIRUB SERPL-MCNC: 0.77 MG/DL (ref 0.2–1)
BUN SERPL-MCNC: 12 MG/DL (ref 5–25)
CALCIUM SERPL-MCNC: 8.8 MG/DL (ref 8.3–10.1)
CHLORIDE SERPL-SCNC: 105 MMOL/L (ref 100–108)
CHOLEST SERPL-MCNC: 234 MG/DL (ref 50–200)
CO2 SERPL-SCNC: 26 MMOL/L (ref 21–32)
CREAT SERPL-MCNC: 0.59 MG/DL (ref 0.6–1.3)
GFR SERPL CREATININE-BSD FRML MDRD: 94 ML/MIN/1.73SQ M
GLUCOSE P FAST SERPL-MCNC: 76 MG/DL (ref 65–99)
HDLC SERPL-MCNC: 55 MG/DL
LDLC SERPL CALC-MCNC: 143 MG/DL (ref 0–100)
NONHDLC SERPL-MCNC: 179 MG/DL
POTASSIUM SERPL-SCNC: 4.2 MMOL/L (ref 3.5–5.3)
PROT SERPL-MCNC: 8 G/DL (ref 6.4–8.2)
SARS-COV-2 IGG+IGM SERPL QL IA: NORMAL
SODIUM SERPL-SCNC: 137 MMOL/L (ref 136–145)
TRIGL SERPL-MCNC: 182 MG/DL

## 2021-07-26 PROCEDURE — 36415 COLL VENOUS BLD VENIPUNCTURE: CPT

## 2021-07-26 PROCEDURE — 82306 VITAMIN D 25 HYDROXY: CPT

## 2021-07-26 PROCEDURE — 86769 SARS-COV-2 COVID-19 ANTIBODY: CPT

## 2021-07-26 PROCEDURE — 80061 LIPID PANEL: CPT

## 2021-07-26 PROCEDURE — 80053 COMPREHEN METABOLIC PANEL: CPT

## 2021-08-16 ENCOUNTER — OFFICE VISIT (OUTPATIENT)
Dept: FAMILY MEDICINE CLINIC | Facility: CLINIC | Age: 66
End: 2021-08-16
Payer: MEDICARE

## 2021-08-16 VITALS
WEIGHT: 162 LBS | BODY MASS INDEX: 25.43 KG/M2 | DIASTOLIC BLOOD PRESSURE: 64 MMHG | HEIGHT: 67 IN | HEART RATE: 60 BPM | SYSTOLIC BLOOD PRESSURE: 112 MMHG | RESPIRATION RATE: 16 BRPM

## 2021-08-16 DIAGNOSIS — Z71.89 EDUCATED ABOUT COVID-19 VIRUS INFECTION: ICD-10-CM

## 2021-08-16 DIAGNOSIS — E55.9 VITAMIN D DEFICIENCY: ICD-10-CM

## 2021-08-16 DIAGNOSIS — F31.9 BIPOLAR 1 DISORDER (HCC): ICD-10-CM

## 2021-08-16 DIAGNOSIS — K57.90 DIVERTICULOSIS: ICD-10-CM

## 2021-08-16 DIAGNOSIS — E78.2 MIXED HYPERLIPIDEMIA: Primary | ICD-10-CM

## 2021-08-16 DIAGNOSIS — R77.8 ELEVATED TROPONIN: ICD-10-CM

## 2021-08-16 PROBLEM — J01.91 ACUTE RECURRENT SINUSITIS: Status: RESOLVED | Noted: 2021-04-09 | Resolved: 2021-08-16

## 2021-08-16 PROCEDURE — G0438 PPPS, INITIAL VISIT: HCPCS | Performed by: FAMILY MEDICINE

## 2021-08-16 PROCEDURE — 1123F ACP DISCUSS/DSCN MKR DOCD: CPT | Performed by: FAMILY MEDICINE

## 2021-08-16 PROCEDURE — 99214 OFFICE O/P EST MOD 30 MIN: CPT | Performed by: FAMILY MEDICINE

## 2021-08-16 NOTE — PATIENT INSTRUCTIONS
Problem List Items Addressed This Visit     Bipolar 1 disorder ([address] )     Contines on Lexapro  She is also using essential oils as well  Relevant Orders    Comprehensive metabolic panel    Diverticulosis     Reviewed  She is without symptoms  No changes  Elevated troponin     No reports from before from Cardio  I would recommend follow with Cardio  Hyperlipidemia - Primary     Cholesterol is not at goal   I would recommend that she take the liptor daily  Recheck in 6 months  Relevant Orders    Cholesterol, total    Comprehensive metabolic panel    HDL cholesterol    LDL cholesterol, direct    Vitamin D deficiency     Add Vit D 2000units daily  This is in addition to the amount in her supplements  Relevant Orders    Vitamin D 25 hydroxy      Other Visit Diagnoses     Educated about COVID-19 virus infection        I would recommend vaccine  She has no antibodies currently  COVID 19 Instructions    Zoila Bank was advised to limit contact with others to essential tasks such as getting food, medications, and medical care  Proper handwashing reviewed, and Hand sanitzer when washing is not available  If the patient develops symptoms of COVID 19, the patient should call the office as soon as possible  For 1669-7866 Flu season, it is strongly recommended that Flu Vaccinations be obtained  Virtual Visits may be conducted in several ways: Yue: You should get a text message when the provider is ready to see you  Click on the link in the text message, and the call should start  You will need to type in your name, and allow camera and microphone access  This is HIPPA compliant, and secure  Please try to download Google Duo  Once you do download this on your phone, you will be prompted to add your phone number to the account  After that, he should receive a text from [employer], and use that code to verify your phone number    After that, you should be able to use Google Duo to receive and make video calls  Please download Microsoft Teams to your phone or computer  You would get an email with the meeting after scheduling with the office  You will Join the meeting, and wait there till the provider joins as well  Instructions for downloading this are available from the office  This is HIPPA compliant, and secure  We are committed to getting you vaccinated as soon as possible and will be closely following CDC and [facility]  guidelines as they are released and revised  Please refer to our COVID-19 vaccine webpage for the most up to date information on the vaccine and our distribution efforts  [employer] tn    OUR NEW LOCATION:  Starting around 28June2021, our new location and phone are:    [address], [address] [place], [place], [address]  Fax: [phone]    Lab services and OB/GYN will be at this location as well

## 2021-08-16 NOTE — PROGRESS NOTES
Assessment and Plan:    Problem List Items Addressed This Visit     Bipolar 1 disorder ([address] )     Contines on Lexapro  She is also using essential oils as well  Relevant Orders    Comprehensive metabolic panel    Diverticulosis     Reviewed  She is without symptoms  No changes  Elevated troponin     No reports from before from Cardio  I would recommend follow with Cardio  Hyperlipidemia - Primary     Cholesterol is not at goal   I would recommend that she take the liptor daily  Recheck in 6 months  Relevant Orders    Cholesterol, total    Comprehensive metabolic panel    HDL cholesterol    LDL cholesterol, direct    Vitamin D deficiency     Add Vit D 2000units daily  This is in addition to the amount in her supplements  Relevant Orders    Vitamin D 25 hydroxy      Other Visit Diagnoses     Educated about COVID-19 virus infection        I would recommend vaccine  She has no antibodies currently  Diagnoses and all orders for this visit:    Mixed hyperlipidemia  -     Cholesterol, total; Future  -     Comprehensive metabolic panel; Future  -     HDL cholesterol; Future  -     LDL cholesterol, direct; Future    Elevated troponin    Vitamin D deficiency  -     Vitamin D 25 hydroxy; Future    Bipolar 1 disorder (HCC)  -     Comprehensive metabolic panel; Future    Educated about COVID-19 virus infection  Comments:  I would recommend vaccine  She has no antibodies currently  Diverticulosis              Subjective:      Patient ID: Tirso Angeles is a 74 y o  female  CC:    Chief Complaint   Patient presents with    Follow-up     Pt here for a follow up and to review lab results  R cruz  Medicare Wellness Visit       HPI:    Patient is here to follow-up on multiple issues  She recently did have lifeline testing  She reports they did some blood test     Hyperlipidemia: She is doing well so far  No concerns  On Lipitor 10mg      Vit D defic: On Vit D in her MVI supplements  Not using Vit D alone  COVID antibodies reviewed  COVID vaccine reviewed  She asked about Ivermectin and Hydroxychloroquine  Diverticulosis: She has had colonscopy before, and no problems  She is seeing dental for implant  Carotid: Due some time after 2022  The following portions of the patient's history were reviewed and updated as appropriate: allergies, current medications, past family history, past medical history, past social history, past surgical history and problem list       Review of Systems   Constitutional: Negative  HENT: Negative  Respiratory: Negative  Cardiovascular: Negative  All other systems reviewed and are negative  Data to review:   Sodium 137, potassium 4 2, calcium 8 8  Creatinine 0 59, GFR:  94  AST 21, ALT 36  Blood sugar 76  Vit D 18 9  Total cholesterol 234, , HDL 55, triglycerides 182  COVID antibody testing: Negative  Objective:    Vitals:    08/16/21 0926   BP: 112/64   BP Location: Left arm   Patient Position: Sitting   Cuff Size: Large   Pulse: 60   Resp: 16   Weight: 73 5 kg (162 lb)   Height: 5' 7" (1 702 m)        Physical Exam  Vitals and nursing note reviewed  Constitutional:       Appearance: Normal appearance  HENT:      Head: Normocephalic  Cardiovascular:      Rate and Rhythm: Normal rate and regular rhythm  Pulses: Normal pulses  Carotid pulses are 2+ on the right side and 2+ on the left side  Heart sounds: Normal heart sounds  No murmur heard  No friction rub  No gallop  Pulmonary:      Effort: Pulmonary effort is normal  No respiratory distress  Breath sounds: Normal breath sounds  No stridor  No wheezing, rhonchi or rales  Chest:      Chest wall: No tenderness  Neurological:      Mental Status: She is alert

## 2021-08-16 NOTE — PROGRESS NOTES
Assessment and Plan:     Problem List Items Addressed This Visit     None           Preventive health issues were discussed with patient, and age appropriate screening tests were ordered as noted in patient's After Visit Summary  Personalized health advice and appropriate referrals for health education or preventive services given if needed, as noted in patient's After Visit Summary       History of Present Illness:     Patient presents for Medicare Annual Wellness visit    Patient Care Team:  Sheba Ferrell MD as PCP - General  DO Loli Clark PA-C Nelly Chamberlain, PA-C Britt Mages, PA-C     Problem List:     Patient Active Problem List   Diagnosis    Acid reflux    Allergic rhinitis    [facility] disorder ([facility] )    Chronic pain of both knees    Diverticulosis    Hyperlipidemia    Primary localized osteoarthritis of left knee    Primary osteoarthritis of right knee    Vitamin D deficiency    Injury of ankle    Metatarsalgia    Hammer toe    Acquired hallux rigidus    Recovering alcoholic in remission ([facility] )    Itching    Atypical chest pain    Elevated troponin    Rib fracture    Syncope and collapse    Acute recurrent sinusitis    Heart valve regurgitation    Carotid stenosis      Past Medical and Surgical History:     Past Medical History:   Diagnosis Date    Anxiety     Depression     Uncomplicated alcohol abuse      Past Surgical History:   Procedure Laterality Date    BREAST BIOPSY Right     benign    INCISIONAL BREAST BIOPSY        Family History:     Family History   Problem Relation Age of Onset    Breast cancer Mother     Alcohol abuse Father     Coronary artery disease Father     Alcohol abuse Brother     Bipolar disorder Brother     Alcohol abuse Brother     No Known Problems Sister     No Known Problems Maternal Grandmother     No Known Problems Maternal Grandfather     No Known Problems Paternal Grandmother     No Known Problems Paternal Grandfather     No Known Problems Maternal Aunt     No Known Problems Maternal Aunt     Cancer Maternal Aunt     No Known Problems Paternal Aunt     No Known Problems Paternal Aunt       Social History:     Social History     Socioeconomic History    Marital status: /Civil Union     Spouse name: Not on file    Number of children: Not on file    Years of education: Not on file    Highest education level: Not on file   Occupational History    Not on file   Tobacco Use    Smoking status: Former Smoker    Smokeless tobacco: Never Used   Vaping Use    Vaping Use: Never used   Substance and Sexual Activity    Alcohol use: Not Currently    Drug use: No    Sexual activity: Yes   Other Topics Concern    Not on file   Social History Narrative    Not on file     Social Determinants of Health     Financial Resource Strain:     Difficulty of Paying Living Expenses:    Food Insecurity:     Worried About Running Out of Food in the Last Year:     [address] in the Last Year:    Transportation Needs:     Lack of Transportation (Medical):      Lack of Transportation (Non-Medical):    Physical Activity:     Days of Exercise per Week:     Minutes of Exercise per Session:    Stress:     Feeling of Stress :    Social Connections:     Frequency of Communication with Friends and Family:     Frequency of Social Gatherings with Friends and Family:     Attends [employer] Services:     Active Member of Clubs or Organizations:     Attends Club or Organization Meetings:     Marital Status:    Intimate Partner Violence:     Fear of Current or Ex-Partner:     Emotionally Abused:     Physically Abused:     Sexually Abused:       Medications and Allergies:     Current Outpatient Medications   Medication Sig Dispense Refill    atorvastatin (LIPITOR) 10 mg tablet Take 10 mg by mouth daily      cholecalciferol (VITAMIN D3) 1,000 units tablet Take 1 tablet (1,000 Units total) by mouth daily 30 tablet 11  escitalopram (LEXAPRO) 10 mg tablet TAKE 1 TABLET BY MOUTH EVERY DAY 90 tablet 1    estradiol (ESTRACE) 0 1 mg/g vaginal cream Insert 2 g into the vagina      fluticasone (FLONASE) 50 mcg/act nasal spray 2 sprays into each nostril daily      Multiple Vitamins-Minerals (multivitamin with minerals) tablet Take 1 tablet by mouth daily      amoxicillin (AMOXIL) 500 MG tablet  (Patient not taking: Reported on 8/16/2021)       No current facility-administered medications for this visit  Allergies   Allergen Reactions    Alcohol - Food Allergy Confusion and Delirium     In Recovery    Pravastatin Other (See Comments)     Felt ill    Zithromax [Azithromycin] Diarrhea and Rash      Immunizations:     Immunization History   Administered Date(s) Administered    INFLUENZA 01/01/2005, 12/04/2014    Influenza Quadrivalent Preservative Free 3 years and older IM 09/23/2016    Influenza, high dose seasonal 0 7 mL 09/24/2018, 10/04/2019    Influenza, seasonal, injectable 11/15/2011, 11/01/2017    Pneumococcal Polysaccharide PPV23 09/24/2018    Tdap 10/04/2015      Health Maintenance:         Topic Date Due    Hepatitis C Screening  Never done    Breast Cancer Screening: Mammogram  08/31/2021    Colorectal Cancer Screening  02/04/2023    DXA SCAN  03/02/2023         Topic Date Due    COVID-19 Vaccine (1) Never done    Influenza Vaccine (1) 09/01/2021      Medicare Health Risk Assessment:     There were no vitals taken for this visit  Diane Smith is here for her Subsequent Wellness visit  Health Risk Assessment:   Patient rates overall health as good  Patient feels that their physical health rating is same  Patient is satisfied with their life  Eyesight was rated as same  Hearing was rated as same  Patient feels that their emotional and mental health rating is same  Patients states they are often angry  Patient states they are sometimes unusually tired/fatigued   Pain experienced in the last 7 days has been none  Patient states that she has experienced weight loss or gain in last 6 months  Depression Screening:   PHQ-2 Score: 0      Fall Risk Screening: In the past year, patient has experienced: history of falling in past year    Number of falls: 1  Injured during fall?: No    Feels unsteady when standing or walking?: No    Worried about falling?: No      Urinary Incontinence Screening:   Patient has leaked urine accidently in the last six months  Home Safety:  Patient does not have trouble with stairs inside or outside of their home  Patient has working smoke alarms and has working carbon monoxide detector  Home safety hazards include: none  Nutrition:   Current diet is Regular  Medications:   Patient is currently taking over-the-counter supplements  OTC medications include: see medication list  Patient is able to manage medications  Activities of Daily Living (ADLs)/Instrumental Activities of Daily Living (IADLs):   Walk and transfer into and out of bed and chair?: Yes  Dress and groom yourself?: Yes    Bathe or shower yourself?: Yes    Feed yourself? Yes  Do your laundry/housekeeping?: Yes  Manage your money, pay your bills and track your expenses?: Yes  Make your own meals?: Yes    Do your own shopping?: Yes    Previous Hospitalizations:   Any hospitalizations or ED visits within the last 12 months?: Yes    How many hospitalizations have you had in the last year?: 1-2    Advance Care Planning:   Living will: Yes    Durable POA for healthcare:  Yes    Advanced directive: Yes    Advanced directive counseling given: Yes    Five wishes given: No      Cognitive Screening:   Provider or family/friend/caregiver concerned regarding cognition?: No    PREVENTIVE SCREENINGS      Cardiovascular Screening:    General: Screening Not Indicated and History Lipid Disorder      Diabetes Screening:     General: Screening Current      Colorectal Cancer Screening:     General: Screening Current      Breast Cancer Screening:     General: Screening Current      Cervical Cancer Screening:    General: Screening Not Indicated      Osteoporosis Screening:    General: Screening Current      Abdominal Aortic Aneurysm (AAA) Screening:        General: Screening Not Indicated      Lung Cancer Screening:     General: Screening Not Indicated      Hepatitis C Screening:    General: Screening Not Indicated    Screening, Brief Intervention, and Referral to Treatment (SBIRT)    Screening  Typical number of drinks in a day: 0  Typical number of drinks in a week: 0  Interpretation: Low risk drinking behavior      Single Item Drug Screening:  How often have you used an illegal drug (including marijuana) or a prescription medication for non-medical reasons in the past year? never    Single Item Drug Screen Score: 0  Interpretation: Negative screen for possible drug use disorder      Chris Veloz MD

## 2021-08-23 ENCOUNTER — TELEMEDICINE (OUTPATIENT)
Dept: FAMILY MEDICINE CLINIC | Facility: CLINIC | Age: 66
End: 2021-08-23
Payer: MEDICARE

## 2021-08-23 VITALS — BODY MASS INDEX: 25.43 KG/M2 | HEIGHT: 67 IN | WEIGHT: 162 LBS

## 2021-08-23 DIAGNOSIS — J02.9 PHARYNGITIS, UNSPECIFIED ETIOLOGY: Primary | ICD-10-CM

## 2021-08-23 DIAGNOSIS — R09.89 CHEST CONGESTION: ICD-10-CM

## 2021-08-23 PROBLEM — R77.8 ELEVATED TROPONIN: Status: RESOLVED | Noted: 2020-04-22 | Resolved: 2021-08-23

## 2021-08-23 PROBLEM — R79.89 ELEVATED TROPONIN: Status: RESOLVED | Noted: 2020-04-22 | Resolved: 2021-08-23

## 2021-08-23 PROCEDURE — 99213 OFFICE O/P EST LOW 20 MIN: CPT | Performed by: FAMILY MEDICINE

## 2021-08-23 RX ORDER — AMOXICILLIN AND CLAVULANATE POTASSIUM 875; 125 MG/1; MG/1
1 TABLET, FILM COATED ORAL EVERY 12 HOURS SCHEDULED
Qty: 20 TABLET | Refills: 0 | Status: SHIPPED | OUTPATIENT
Start: 2021-08-23 | End: 2021-09-02

## 2021-08-23 NOTE — PROGRESS NOTES
Virtual Regular Visit    Verification of patient location:    Patient is located in the following state in which I hold an active license [place]      Assessment/Plan:    Problem List Items Addressed This Visit     None      Visit Diagnoses     Pharyngitis, unspecified etiology    -  Primary    Relevant Medications    amoxicillin-clavulanate (Augmentin) 875-125 mg per tablet    Chest congestion        Relevant Orders    XR chest pa & lateral               Reason for visit is   Chief Complaint   Patient presents with    Virtual Regular Visit     Patient cold sxs experiencing cough, sore throat, chest congestion onset last night        Encounter provider Juan Cotton MD    Provider located at [address]  [address] [address] [id number] [phone]      Recent Visits  Date Type Provider Dept   08/16/21 Office Visit Joseph Pryor MD [address] [place] [address] Primary Care   Showing recent visits within past 7 days and meeting all other requirements  Today's Visits  Date Type Provider Dept   08/23/21 Telemedicine Juan Cotton MD [address] [place] [address] Primary Care   Showing today's visits and meeting all other requirements  Future Appointments  No visits were found meeting these conditions  Showing future appointments within next 150 days and meeting all other requirements       The patient was identified by name and date of birth  Gocherie Tom was informed that this is a telemedicine visit and that the visit is being conducted through [address] [place] [address] Now and patient was informed that this is a secure, HIPAA-compliant platform  She agrees to proceed     My office door was closed  No one else was in the room  She acknowledged consent and understanding of privacy and security of the video platform  The patient has agreed to participate and understands they can discontinue the visit at any time  Patient is aware this is a billable service       Subjective  David Santos is a 74 y o  female       Sore throat, chest congestion, great grandchildren 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, cough with clear mucus but thicker than usual, prone to sinus infections, ears bother her , no sinus pain or pressure       Past Medical History:   Diagnosis Date    Anxiety     Depression     Uncomplicated alcohol abuse        Past Surgical History:   Procedure Laterality Date    BREAST BIOPSY Right     benign    INCISIONAL BREAST BIOPSY         Current Outpatient Medications   Medication Sig Dispense Refill    atorvastatin (LIPITOR) 10 mg tablet Take 10 mg by mouth daily      cholecalciferol (VITAMIN D3) 1,000 units tablet Take 1 tablet (1,000 Units total) by mouth daily 30 tablet 11    escitalopram (LEXAPRO) 10 mg tablet TAKE 1 TABLET BY MOUTH EVERY DAY 90 tablet 1    estradiol (ESTRACE) 0 1 mg/g vaginal cream Insert 2 g into the vagina      fluticasone (FLONASE) 50 mcg/act nasal spray 2 sprays into each nostril daily      Multiple Vitamins-Minerals (multivitamin with minerals) tablet Take 1 tablet by mouth daily      amoxicillin-clavulanate (Augmentin) 875-125 mg per tablet Take 1 tablet by mouth every 12 (twelve) hours for 10 days 20 tablet 0     No current facility-administered medications for this visit  Allergies   Allergen Reactions    Alcohol - Food Allergy Confusion and Delirium     In Recovery    Pravastatin Other (See Comments)     Felt ill    Zithromax [Azithromycin] Diarrhea and Rash       Review of Systems   Constitutional: Negative for activity change, appetite change, chills, fatigue and fever  HENT: Positive for ear pain, postnasal drip and sore throat  Negative for rhinorrhea, sinus pressure and sinus pain  Respiratory: Positive for cough  Negative for shortness of breath  Cardiovascular: Negative for chest pain  Neurological: Negative for dizziness and headaches         Video Exam    Vitals:    08/23/21 0917   Weight: 73 5 kg (162 lb)   Height: 5' 7" (1 702 m)       Physical Exam  Vitals reviewed  Constitutional:       Appearance: Normal appearance  HENT:      Nose: No congestion or rhinorrhea  Mouth/Throat:      Pharynx: Posterior oropharyngeal erythema present  No oropharyngeal exudate  Pulmonary:      Effort: Pulmonary effort is normal  No respiratory distress  Lymphadenopathy:      Cervical: No cervical adenopathy  Neurological:      Mental Status: She is alert  Psychiatric:         Mood and Affect: Mood normal           I spent 10 minutes directly with the patient during this visit    VIRTUAL VISIT [facility] verbally agrees to participate in [employer]  Pt is aware that [employer] could be limited without vital signs or the ability to perform a full hands-on physical Alejo Rm understands she or the provider may request at any time to terminate the video visit and request the patient to seek care or treatment in person

## 2021-09-20 DIAGNOSIS — E78.2 MIXED HYPERLIPIDEMIA: Primary | ICD-10-CM

## 2021-09-22 RX ORDER — ATORVASTATIN CALCIUM 10 MG/1
10 TABLET, FILM COATED ORAL DAILY
Qty: 90 TABLET | Refills: 1 | Status: SHIPPED | OUTPATIENT
Start: 2021-09-22 | End: 2022-06-20

## 2021-12-08 ENCOUNTER — TELEMEDICINE (OUTPATIENT)
Dept: FAMILY MEDICINE CLINIC | Facility: CLINIC | Age: 66
End: 2021-12-08
Payer: MEDICARE

## 2021-12-08 VITALS — WEIGHT: 162 LBS | BODY MASS INDEX: 25.43 KG/M2 | HEIGHT: 67 IN

## 2021-12-08 DIAGNOSIS — Z20.822 EXPOSURE TO COVID-19 VIRUS: Primary | ICD-10-CM

## 2021-12-08 PROCEDURE — U0003 INFECTIOUS AGENT DETECTION BY NUCLEIC ACID (DNA OR RNA); SEVERE ACUTE RESPIRATORY SYNDROME CORONAVIRUS 2 (SARS-COV-2) (CORONAVIRUS DISEASE [COVID-19]), AMPLIFIED PROBE TECHNIQUE, MAKING USE OF HIGH THROUGHPUT TECHNOLOGIES AS DESCRIBED BY CMS-2020-01-R: HCPCS | Performed by: FAMILY MEDICINE

## 2021-12-08 PROCEDURE — 99213 OFFICE O/P EST LOW 20 MIN: CPT | Performed by: FAMILY MEDICINE

## 2021-12-08 PROCEDURE — U0005 INFEC AGEN DETEC AMPLI PROBE: HCPCS | Performed by: FAMILY MEDICINE

## 2021-12-30 ENCOUNTER — TELEMEDICINE (OUTPATIENT)
Dept: FAMILY MEDICINE CLINIC | Facility: CLINIC | Age: 66
End: 2021-12-30

## 2021-12-30 DIAGNOSIS — Z20.822 ENCOUNTER BY TELEHEALTH FOR SUSPECTED COVID-19: Primary | ICD-10-CM

## 2021-12-30 PROCEDURE — U0003 INFECTIOUS AGENT DETECTION BY NUCLEIC ACID (DNA OR RNA); SEVERE ACUTE RESPIRATORY SYNDROME CORONAVIRUS 2 (SARS-COV-2) (CORONAVIRUS DISEASE [COVID-19]), AMPLIFIED PROBE TECHNIQUE, MAKING USE OF HIGH THROUGHPUT TECHNOLOGIES AS DESCRIBED BY CMS-2020-01-R: HCPCS | Performed by: NURSE PRACTITIONER

## 2021-12-30 PROCEDURE — 99442 PR PHYS/QHP TELEPHONE EVALUATION 11-20 MIN: CPT | Performed by: NURSE PRACTITIONER

## 2021-12-30 RX ORDER — GUAIFENESIN 600 MG
1200 TABLET, EXTENDED RELEASE 12 HR ORAL EVERY 12 HOURS SCHEDULED
Qty: 60 TABLET | Refills: 0 | Status: SHIPPED | OUTPATIENT
Start: 2021-12-30 | End: 2022-06-30

## 2021-12-30 RX ORDER — BENZONATATE 200 MG/1
200 CAPSULE ORAL 3 TIMES DAILY PRN
Qty: 20 CAPSULE | Refills: 0 | Status: SHIPPED | OUTPATIENT
Start: 2021-12-30 | End: 2022-01-11 | Stop reason: SDUPTHER

## 2022-01-02 ENCOUNTER — TELEMEDICINE (OUTPATIENT)
Dept: FAMILY MEDICINE CLINIC | Facility: CLINIC | Age: 67
End: 2022-01-02

## 2022-01-02 VITALS — OXYGEN SATURATION: 98 %

## 2022-01-02 DIAGNOSIS — U07.1 COVID-19 VIRUS INFECTION: Primary | ICD-10-CM

## 2022-01-02 PROCEDURE — 99442 PR PHYS/QHP TELEPHONE EVALUATION 11-20 MIN: CPT | Performed by: NURSE PRACTITIONER

## 2022-01-02 RX ORDER — PREDNISONE 10 MG/1
TABLET ORAL
Qty: 30 TABLET | Refills: 0 | Status: SHIPPED | OUTPATIENT
Start: 2022-01-02 | End: 2022-06-30

## 2022-01-02 RX ORDER — ONDANSETRON 2 MG/ML
4 INJECTION INTRAMUSCULAR; INTRAVENOUS ONCE AS NEEDED
Status: CANCELLED | OUTPATIENT
Start: 2022-01-04

## 2022-01-02 RX ORDER — ACETAMINOPHEN 325 MG/1
650 TABLET ORAL ONCE AS NEEDED
Status: CANCELLED | OUTPATIENT
Start: 2022-01-04

## 2022-01-02 RX ORDER — SODIUM CHLORIDE 9 MG/ML
20 INJECTION, SOLUTION INTRAVENOUS ONCE
Status: CANCELLED | OUTPATIENT
Start: 2022-01-04

## 2022-01-02 RX ORDER — ALBUTEROL SULFATE 90 UG/1
3 AEROSOL, METERED RESPIRATORY (INHALATION) ONCE AS NEEDED
Status: CANCELLED | OUTPATIENT
Start: 2022-01-04

## 2022-01-03 NOTE — ASSESSMENT & PLAN NOTE
2Jan: Day 4  Prednisone taper ordered to help with congestion and sore throat  Monoclonal antibody infusion also ordered for patient  I will follow up with patient again on 1/4/22

## 2022-01-03 NOTE — PROGRESS NOTES
COVID-19 Outpatient Progress Note    Assessment/Plan:    Problem List Items Addressed This Visit        Other    COVID-19 virus infection - Primary     2Jan: Day 4  Prednisone taper ordered to help with congestion and sore throat  Monoclonal antibody infusion also ordered for patient  I will follow up with patient again on 1/4/22  Relevant Medications    predniSONE 10 mg tablet         Disposition:     I recommended continued isolation until at least 24 hours have passed since recovery defined as resolution of fever without the use of fever-reducing medications AND improvement in COVID symptoms AND 10 days have passed since onset of symptoms (or 10 days have passed since date of first positive viral diagnostic test for asymptomatic patients)  Patient is at increased risk of progressing towards severe COVID-19 due to the following high risk criteria:   - Older age (age >= 72years old)  - Obesity or being overweight    Patient meets criteria for Sotrovimab infusion  They were counseled in regards to risks, benefits, and side effects of this infusion  Sotrovimab is an investigational medicine used to treat mild-to-moderate symptoms of COVID-19 in adults and children (15years of age and older weighing at least 80 pounds (40 kg)) with positive results of direct SARS-CoV-2 viral testing, and who are at high risk of progression to severe COVID-19, including hospitalization or death  Sotrovimab is investigational because it is still being studied  There is limited information about the safety and effectiveness of using sotrovimab to treat people with mild-to-moderate COVID-19  The FDA has authorized the emergency use of sotrovimab for the treatment of COVID-19 under an Emergency Use Authorization (EUA)       How will I receive sotrovimab?    - You will receive 1 dose of sotrovimab  - Sotrovimab will be given through a vein (intravenous or IV infusion) over 30 minutes    Possible side effects of sotrovimab: Allergic reactions can happen during and after infusion with sotrovimab  Possible reactions include: fever, chills, nausea, headache, shortness of breath, low or high blood pressure, rapid or slow heart rate, chest discomfort or pain, weakness, confusion, feeling tired, wheezing, swelling of your lips, face, or throat, rash including hives, itching, muscle aches, dizziness, and sweating  These reactions may be severe or life threatening  Worsening symptoms after treatment: You may experience new or worsening symptoms after infusion, including fever, difficulty breathing, rapid or slow heart rate, tiredness, weakness or confusion  If these occur, contact your healthcare provider or seek immediate medical attention as some of these events have required hospitalization  It is unknown if these events are related to treatment or are due to the progression of COVID19  The side effects of getting any medicine by vein may include brief pain, bleeding, bruising of the skin, soreness, swelling, and possible infection at the infusion site  These are not all the possible side effects of sotrovimab  Not a lot of people have been given sotrovimab  Serious and unexpected side effects may happen  Sotrovimab are still being studied so it is possible that all of the risks are not known at this time  It is possible that sotrovimab could interfere with your body's own ability to fight off a future infection of SARS-CoV-2  Similarly, sotrovimab may reduce your bodys immune response to a vaccine for SARS-CoV-2  Specific studies have not been conducted to address these possible risks  Talk to your healthcare provider if you have any questions  Emergency Use Authorization:    The [facility] FDA has made sotrovimab available under an emergency access mechanism called an EUA   The EUA is supported by a [place] of Health and Human Service (HHS) declaration that circumstances exist to justify the emergency use of drugs and biological products during the COVID-19 pandemic  Sotrovimab have not undergone the same type of review as an FDA-approved or cleared product  The FDA may issue an EUA when certain criteria are met, which includes that there are no adequate, approved, and available alternatives  In addition, the FDA decision is based on the totality of scientific evidence available showing that it is reasonable to believe that the product meets certain criteria for safety, performance, and labeling and may be effective in treatment of patients during the COVID-19 pandemic  All of these criteria must be met to allow for the product to be used in the treatment of patients during the COVID-19 pandemic  The EUA for sotrovimab together is in effect for the duration of the COVID-19 declaration justifying emergency use of these products, unless terminated or revoked (after which the product may no longer be used)  What if I am pregnant or breastfeeding? There is no experience treating pregnant women or breastfeeding mothers with sotrovimab  For a mother and unborn baby, the benefit of receiving sotrovimab may be greater than the risk from the treatment  If you are pregnant or breastfeeding, discuss your options and specific situation with your healthcare provider  Regarding COVID-19 Vaccination:    Currently there is no data or safety or efficacy of COVID-19 vaccination in persons who received monoclonal antibodies as part of COVID-19 treatment  Treatment should be deferred for at least 90 days to avoid interference of the treatment with vaccine-induced immune responses (this is based on estimated half-life of therapies and evidence suggesting reinfection is uncommon within 90 days of initial infection)  Full fact sheet document for patients can be found at: [employer]     The patient consents to proceed with sotrovimab infusion      I have spent 15 minutes directly with the patient  Encounter provider BRIAN Carrillo    Provider located at [address] [place] [address]  [address] [place] [id number] [phone]    Recent Visits  Date Type Provider Dept   12/30/21 Telemedicine [facility]   Showing recent visits within past 7 days and meeting all other requirements  Today's Visits  Date Type Provider Dept   01/02/22 Telemedicine [facility]   Showing today's visits and meeting all other requirements  Future Appointments  No visits were found meeting these conditions  Showing future appointments within next 150 days and meeting all other requirements     This virtual check-in was done via telephone and she agrees to proceed  Patient agrees to participate in a virtual check in via telephone or video visit instead of presenting to the office to address urgent/immediate medical needs  Patient is aware this is a billable service  After connecting through Telephone, the patient was identified by name and date of birth  Gonzalo Bryan was informed that this was a telemedicine visit and that the exam was being conducted confidentially over secure lines  My office door was closed  No one else was in the room  Gonzalo Bryan acknowledged consent and understanding of privacy and security of the telemedicine visit  I informed the patient that I have reviewed her record in Epic and presented the opportunity for her to ask any questions regarding the visit today  The patient agreed to participate  It was my intent to perform this visit via video technology but the patient was not able to do a video connection so the visit was completed via audio telephone only  Verification of patient location:  Patient is located in the following state in which I hold an active license: [place]    Subjective:   Gonzalo Bryan is a 74 y o  female who has been screened for COVID-19  Symptom change since last report: unchanged  Patient's symptoms include chills, fatigue, malaise, nasal congestion, rhinorrhea, sore throat, cough (non-productive ) and myalgias  Patient denies fever, anosmia, loss of taste, shortness of breath, chest tightness, abdominal pain, nausea, vomiting, diarrhea and headaches  Date of symptom onset: 12/29/2021  Date of exposure: 12/5/2021  Date of positive COVID-19 PCR: 12/30/2021  COVID-19 vaccination status: Not vaccinated    Yusuf Dutta has been staying home and has isolated themselves in her home  She is taking care to not share personal items and is cleaning all surfaces that are touched often, like counters, tabletops, and doorknobs using household cleaning sprays or wipes  She is wearing a mask when she leaves her room  Patient is reporting symptoms of chills, non-productive cough, rhinorrhea, nasal congestion, sore throat, myalgias, and increased fatigue  Patient denies any fevers or SOB  Patient does qualify for monoclonal antibody infusion and is interested in receiving the infusion  Monoclonal antibody infusion was ordered       Lab Results   Component Value Date    SARSCOV2 Positive (A) 12/30/2021    Kerrierg Opitz Not Detected 04/20/2020     Past Medical History:   Diagnosis Date    Anxiety     Depression     Uncomplicated alcohol abuse      Past Surgical History:   Procedure Laterality Date    BREAST BIOPSY Right     benign    INCISIONAL BREAST BIOPSY       Current Outpatient Medications   Medication Sig Dispense Refill    atorvastatin (LIPITOR) 10 mg tablet Take 1 tablet (10 mg total) by mouth daily 90 tablet 1    benzonatate (TESSALON) 200 MG capsule Take 1 capsule (200 mg total) by mouth 3 (three) times a day as needed for cough 20 capsule 0    cholecalciferol (VITAMIN D3) 1,000 units tablet Take 1 tablet (1,000 Units total) by mouth daily 30 tablet 11    Cholestyramine (QUESTRAN PO) Take by mouth      escitalopram (LEXAPRO) 10 mg tablet TAKE 1 TABLET BY MOUTH EVERY DAY 90 tablet 1    estradiol (ESTRACE) 0 1 mg/g vaginal cream Insert 2 g into the vagina      fluticasone (FLONASE) 50 mcg/act nasal spray 2 sprays into each nostril daily (Patient not taking: Reported on 12/8/2021 )      guaiFENesin (MUCINEX) 600 mg 12 hr tablet Take 2 tablets (1,200 mg total) by mouth every 12 (twelve) hours 60 tablet 0    Multiple Vitamins-Minerals (multivitamin with minerals) tablet Take 1 tablet by mouth daily      predniSONE 10 mg tablet Use 5 tablets for 2 days  Use 4 tablets for 2 days  Use 3 tablets for 2 days  Use 2 tablets for 2 days  Use 1 tablet for 2 days  30 tablet 0    Sodium Chloride-Xylitol (Xlear Sinus Care Spray) SOLN into each nostril      Zinc Sulfate (ZINC 15 PO) Take by mouth       No current facility-administered medications for this visit  Allergies   Allergen Reactions    Alcohol - Food Allergy Confusion and Delirium     In Recovery    Pravastatin Other (See Comments)     Felt ill    Zithromax [Azithromycin] Diarrhea and Rash       Review of Systems   Constitutional: Positive for chills and fatigue  Negative for fever  HENT: Positive for congestion, rhinorrhea and sore throat  Eyes: Negative  Respiratory: Positive for cough (non-productive )  Negative for chest tightness and shortness of breath  Cardiovascular: Negative  Gastrointestinal: Negative for abdominal pain, diarrhea, nausea and vomiting  Endocrine: Negative  Genitourinary: Negative  Musculoskeletal: Positive for myalgias  Skin: Negative  Neurological: Negative for headaches  Hematological: Negative  Psychiatric/Behavioral: Negative  Objective:    Vitals:    01/02/22 1944   SpO2: 98%       Physical Exam  Vitals reviewed: limited due to phone only exam    Neurological:      Mental Status: She is alert  VIRTUAL VISIT [id number] verbally agrees to participate in [employer]   Pt is aware that [facility] could be limited without vital signs or the ability to perform a full hands-on physical Tracey Locket understands she or the provider may request at any time to terminate the video visit and request the patient to seek care or treatment in person

## 2022-01-03 NOTE — PATIENT INSTRUCTIONS
Problem List Items Addressed This Visit        Other    COVID-19 virus infection - Primary     2Jan: Day 4  Prednisone taper ordered to help with congestion and sore throat  Monoclonal antibody infusion also ordered for patient  I will follow up with patient again on 1/4/22  Relevant Medications    predniSONE 10 mg tablet        COVID-19 Home Care Guidelines    Your healthcare provider and/or public health staff have evaluated you and have determined that you do not need to remain in the hospital at this time  At this time you can be isolated at home where you will be monitored by staff from your local or state health department  You should carefully follow the prevention and isolation steps below until a healthcare provider or local or state health department says that you can return to your normal activities  Stay home except to get medical care    People who are mildly ill with COVID-19 are able to isolate at home during their illness  You should restrict activities outside your home, except for getting medical care  Do not go to work, school, or public areas  Avoid using public transportation, ride-sharing, or taxis  Separate yourself from other people and animals in your home    People: As much as possible, you should stay in a specific room and away from other people in your home  Also, you should use a separate bathroom, if available  Animals: You should restrict contact with pets and other animals while you are sick with COVID-19, just like you would around other people  Although there have not been reports of pets or other animals becoming sick with COVID-19, it is still recommended that people sick with COVID-19 limit contact with animals until more information is known about the virus  When possible, have another member of your household care for your animals while you are sick   If you are sick with COVID-19, avoid contact with your pet, including petting, snuggling, being kissed or licked, and sharing food  If you must care for your pet or be around animals while you are sick, wash your hands before and after you interact with pets and wear a facemask  See COVID-19 and Animals for more information  Call ahead before visiting your doctor    If you have a medical appointment, call the healthcare provider and tell them that you have or may have COVID-19  This will help the healthcare providers office take steps to keep other people from getting infected or exposed  Wear a facemask    You should wear a facemask when you are around other people (e g , sharing a room or vehicle) or pets and before you enter a healthcare providers office  If you are not able to wear a facemask (for example, because it causes trouble breathing), then people who live with you should not stay in the same room with you, or they should wear a facemask if they enter your room  Cover your coughs and sneezes    Cover your mouth and nose with a tissue when you cough or sneeze  Throw used tissues in a lined trash can  Immediately wash your hands with soap and water for at least 20 seconds or, if soap and water are not available, clean your hands with an alcohol-based hand  that contains at least 60% alcohol  Clean your hands often    Wash your hands often with soap and water for at least 20 seconds, especially after blowing your nose, coughing, or sneezing; going to the bathroom; and before eating or preparing food  If soap and water are not readily available, use an alcohol-based hand  with at least 60% alcohol, covering all surfaces of your hands and rubbing them together until they feel dry  Soap and water are the best option if hands are visibly dirty  Avoid touching your eyes, nose, and mouth with unwashed hands  Avoid sharing personal household items    You should not share dishes, drinking glasses, cups, eating utensils, towels, or bedding with other people or pets in your home   After using these items, they should be washed thoroughly with soap and water  Clean all high-touch surfaces everyday    High touch surfaces include counters, tabletops, doorknobs, bathroom fixtures, toilets, phones, keyboards, tablets, and bedside tables  Also, clean any surfaces that may have blood, stool, or body fluids on them  Use a household cleaning spray or wipe, according to the label instructions  Labels contain instructions for safe and effective use of the cleaning product including precautions you should take when applying the product, such as wearing gloves and making sure you have good ventilation during use of the product  Monitor your symptoms    Seek prompt medical attention if your illness is worsening (e g , difficulty breathing)  Before seeking care, call your healthcare provider and tell them that you have, or are being evaluated for, COVID-19  Put on a facemask before you enter the facility  These steps will help the healthcare providers office to keep other people in the office or waiting room from getting infected or exposed  Ask your healthcare provider to call the local or [facility] health department  Persons who are placed under active monitoring or facilitated self-monitoring should follow instructions provided by their local health department or occupational health professionals, as appropriate  If you have a medical emergency and need to call 911, notify the dispatch personnel that you have, or are being evaluated for COVID-19  If possible, put on a facemask before emergency medical services arrive      Discontinuing home isolation    Patients with confirmed COVID-19 should remain under home isolation precautions until the following conditions are met:   - They have had no fever for at least 24 hours (that is one full day of no fever without the use medicine that reduces fevers)  AND  - other symptoms have improved (for example, when their cough or shortness of breath have improved)  AND  - If had mild or moderate illness, at least 10 days have passed since their symptoms first appeared or if severe illness (needed oxygen) or immunosuppressed, at least 20 days have passed since symptoms first appeared  Patients with confirmed COVID-19 should also notify close contacts (including their workplace) and ask that they self-quarantine  Currently, close contact is defined as being within 6 feet for 15 minutes or more from the period 24 hours starting 48 hours before symptom onset to the time at which the patient went into isolation  Close contacts of patients diagnosed with COVID-19 should be instructed by the patient to self-quarantine for 14 days from the last time of their last contact with the patient  Source: [facility]    Sotrovimab is an investigational medicine used to treat mild-to-moderate symptoms of COVID-19 in adults and children (15years of age and older weighing at least 80 pounds [40 kg]) with positive results of direct SARS-CoV-2 viral testing, and who are at high risk of progression to severe COVID-19, including hospitalization or death  Sotrovimab is investigational because it is still being studied  There is limited information about the safety and effectiveness of using sotrovimab to treat people with mild-to-moderate COVID-19  The FDA has authorized the emergency use of sotrovimab for the treatment of COVID-19 under an Emergency Use Authorization (EUA)  How will I receive sotrovimab?  You will receive 1 dose of sotrovimab   Sotrovimab will be given through a vein (intravenous or IV infusion) over 30 minutes    Possible side effects of sotrovimab: Allergic reactions can happen during and after infusion with sotrovimab      Possible reactions include: fever, chills, nausea, headache, shortness of breath, low or high blood pressure, rapid or slow heart rate, chest discomfort or pain, weakness, confusion, feeling tired, wheezing, swelling of your lips, face, or throat, rash including hives, itching, muscle aches, dizziness, and sweating  These reactions may be severe or life threatening  Worsening symptoms after treatment: You may experience new or worsening symptoms after infusion, including fever, difficulty breathing, rapid or slow heart rate, tiredness, weakness or confusion  If these occur, contact your healthcare provider or seek immediate medical attention as some of these events have required hospitalization  It is unknown if these events are related to treatment or are due to the progression of COVID19  The side effects of getting any medicine by vein may include brief pain, bleeding, bruising of the skin, soreness, swelling, and possible infection at the infusion site  These are not all the possible side effects of sotrovimab  Not a lot of people have been given sotrovimab  Serious and unexpected side effects may happen  Sotrovimab are still being studied so it is possible that all of the risks are not known at this time  It is possible that sotrovimab could interfere with your body's own ability to fight off a future infection of SARS-CoV-2  Similarly, sotrovimab may reduce your bodys immune response to a vaccine for SARS-CoV-2  Specific studies have not been conducted to address these possible risks  Talk to your healthcare provider if you have any questions  Emergency Use Authorization:    The [facility] FDA has made sotrovimab available under an emergency access mechanism called an EUA  The EUA is supported by a  of Health and Human Service (HHS) declaration that circumstances exist to justify the emergency use of drugs and biological products during the COVID-19 pandemic  Sotrovimab have not undergone the same type of review as an FDA-approved or cleared product   The FDA may issue an EUA when certain criteria are met, which includes that there are no adequate, approved, and available alternatives  In addition, the FDA decision is based on the totality of scientific evidence available showing that it is reasonable to believe that the product meets certain criteria for safety, performance, and labeling and may be effective in treatment of patients during the COVID-19 pandemic  All of these criteria must be met to allow for the product to be used in the treatment of patients during the COVID-19 pandemic  The EUA for sotrovimab together is in effect for the duration of the COVID-19 declaration justifying emergency use of these products, unless terminated or revoked (after which the product may no longer be used)  What if I am pregnant or breastfeeding? There is no experience treating pregnant women or breastfeeding mothers with sotrovimab  For a mother and unborn baby, the benefit of receiving sotrovimab may be greater than the risk from the treatment  If you are pregnant or breastfeeding, discuss your options and specific situation with your healthcare provider  Regarding COVID-19 Vaccination:    Currently there is no data or safety or efficacy of COVID-19 vaccination in persons who received monoclonal antibodies as part of COVID-19 treatment  Treatment should be deferred for at least 90 days to avoid interference of the treatment with vaccine-induced immune responses (this is based on estimated half-life of therapies and evidence suggesting reinfection is uncommon within 90 days of initial infection)  Full fact sheet document for patients can be found at: [employer]    The patient consents to proceed with sotrovimab infusion

## 2022-01-04 ENCOUNTER — TELEMEDICINE (OUTPATIENT)
Dept: FAMILY MEDICINE CLINIC | Facility: CLINIC | Age: 67
End: 2022-01-04

## 2022-01-04 DIAGNOSIS — U07.1 COVID-19 VIRUS INFECTION: Primary | ICD-10-CM

## 2022-01-04 PROCEDURE — 99442 PR PHYS/QHP TELEPHONE EVALUATION 11-20 MIN: CPT | Performed by: NURSE PRACTITIONER

## 2022-01-04 NOTE — ASSESSMENT & PLAN NOTE
4Jan: Day 6  Patient was advised to begin taking prednisone to help with sore throat and congestion and patient reported she will begin to take medication  Patient was advised to discontinue hydroxychloroquine however, patient reports she will continue to take medication  I will follow-up with patient again on 01/06/2022

## 2022-01-04 NOTE — PATIENT INSTRUCTIONS
Problem List Items Addressed This Visit        Other    COVID-19 virus infection - Primary     4Jan: Day 10  Patient was advised to begin taking prednisone to help with sore throat and congestion and patient reported she will begin to take medication  Patient was advised to discontinue hydroxychloroquine however, patient reports she will continue to take medication  I will follow-up with patient again on 01/06/2022  [address]    Your healthcare provider and/or public health staff have evaluated you and have determined that you do not need to remain in the hospital at this time  At this time you can be isolated at home where you will be monitored by staff from your local or state health department  You should carefully follow the prevention and isolation steps below until a healthcare provider or local or state health department says that you can return to your normal activities  Stay home except to get medical care    People who are mildly ill with COVID-19 are able to isolate at home during their illness  You should restrict activities outside your home, except for getting medical care  Do not go to work, school, or public areas  Avoid using public transportation, ride-sharing, or taxis  Separate yourself from other people and animals in your home    People: As much as possible, you should stay in a specific room and away from other people in your home  Also, you should use a separate bathroom, if available  Animals: You should restrict contact with pets and other animals while you are sick with COVID-19, just like you would around other people  Although there have not been reports of pets or other animals becoming sick with COVID-19, it is still recommended that people sick with COVID-19 limit contact with animals until more information is known about the virus  When possible, have another member of your household care for your animals while you are sick   If you are sick with COVID-19, avoid contact with your pet, including petting, snuggling, being kissed or licked, and sharing food  If you must care for your pet or be around animals while you are sick, wash your hands before and after you interact with pets and wear a facemask  See COVID-19 and Animals for more information  Call ahead before visiting your doctor    If you have a medical appointment, call the healthcare provider and tell them that you have or may have COVID-19  This will help the healthcare providers office take steps to keep other people from getting infected or exposed  Wear a facemask    You should wear a facemask when you are around other people (e g , sharing a room or vehicle) or pets and before you enter a healthcare providers office  If you are not able to wear a facemask (for example, because it causes trouble breathing), then people who live with you should not stay in the same room with you, or they should wear a facemask if they enter your room  Cover your coughs and sneezes    Cover your mouth and nose with a tissue when you cough or sneeze  Throw used tissues in a lined trash can  Immediately wash your hands with soap and water for at least 20 seconds or, if soap and water are not available, clean your hands with an alcohol-based hand  that contains at least 60% alcohol  Clean your hands often    Wash your hands often with soap and water for at least 20 seconds, especially after blowing your nose, coughing, or sneezing; going to the bathroom; and before eating or preparing food  If soap and water are not readily available, use an alcohol-based hand  with at least 60% alcohol, covering all surfaces of your hands and rubbing them together until they feel dry  Soap and water are the best option if hands are visibly dirty  Avoid touching your eyes, nose, and mouth with unwashed hands      Avoid sharing personal household items    You should not share dishes, drinking glasses, cups, eating utensils, towels, or bedding with other people or pets in your home  After using these items, they should be washed thoroughly with soap and water  Clean all high-touch surfaces everyday    High touch surfaces include counters, tabletops, doorknobs, bathroom fixtures, toilets, phones, keyboards, tablets, and bedside tables  Also, clean any surfaces that may have blood, stool, or body fluids on them  Use a household cleaning spray or wipe, according to the label instructions  Labels contain instructions for safe and effective use of the cleaning product including precautions you should take when applying the product, such as wearing gloves and making sure you have good ventilation during use of the product  Monitor your symptoms    Seek prompt medical attention if your illness is worsening (e g , difficulty breathing)  Before seeking care, call your healthcare provider and tell them that you have, or are being evaluated for, COVID-19  Put on a facemask before you enter the facility  These steps will help the healthcare providers office to keep other people in the office or waiting room from getting infected or exposed  Ask your healthcare provider to call the local or [facility] health department  Persons who are placed under active monitoring or facilitated self-monitoring should follow instructions provided by their local health department or occupational health professionals, as appropriate  If you have a medical emergency and need to call 911, notify the dispatch personnel that you have, or are being evaluated for COVID-19  If possible, put on a facemask before emergency medical services arrive      Discontinuing home isolation    Patients with confirmed COVID-19 should remain under home isolation precautions until the following conditions are met:   - They have had no fever for at least 24 hours (that is one full day of no fever without the use medicine that reduces fevers)  AND  - other symptoms have improved (for example, when their cough or shortness of breath have improved)  AND  - If had mild or moderate illness, at least 10 days have passed since their symptoms first appeared or if severe illness (needed oxygen) or immunosuppressed, at least 20 days have passed since symptoms first appeared  Patients with confirmed COVID-19 should also notify close contacts (including their workplace) and ask that they self-quarantine  Currently, close contact is defined as being within 6 feet for 15 minutes or more from the period 24 hours starting 48 hours before symptom onset to the time at which the patient went into isolation  Close contacts of patients diagnosed with COVID-19 should be instructed by the patient to self-quarantine for 14 days from the last time of their last contact with the patient       Source: [employer]

## 2022-01-04 NOTE — PROGRESS NOTES
COVID-19 Outpatient Progress Note    Assessment/Plan:    Problem List Items Addressed This Visit        Other    COVID-19 virus infection - Primary     4Jan: Day 6  Patient was advised to begin taking prednisone to help with sore throat and congestion and patient reported she will begin to take medication  Patient was advised to discontinue hydroxychloroquine however, patient reports she will continue to take medication  I will follow-up with patient again on 01/06/2022  Disposition:     I recommended continued isolation until at least 24 hours have passed since recovery defined as resolution of fever without the use of fever-reducing medications AND improvement in COVID symptoms AND 10 days have passed since onset of symptoms (or 10 days have passed since date of first positive viral diagnostic test for asymptomatic patients)  I have spent 15 minutes directly with the patient  Encounter provider BRIAN Ortiz    Provider located at [address] [address] [phone]    Recent Visits  Date Type Provider Dept   01/02/22 Telemedicine [facility]RIAN [facility]iz [facility]   12/30/21 Telemedicine [facility]   Showing recent visits within past 7 days and meeting all other requirements  Today's Visits  Date Type Provider Dept   01/04/22 Telemedicine [facility]   Showing today's visits and meeting all other requirements  Future Appointments  No visits were found meeting these conditions  Showing future appointments within next 150 days and meeting all other requirements     This virtual check-in was done via telephone and she agrees to proceed  Patient agrees to participate in a virtual check in via telephone or video visit instead of presenting to the office to address urgent/immediate medical needs   Patient is aware this is a billable service  After connecting through Telephone, the patient was identified by name and date of birth  Leonardo Castrejon was informed that this was a telemedicine visit and that the exam was being conducted confidentially over secure lines  My office door was closed  No one else was in the room  Leonardo Castrejon acknowledged consent and understanding of privacy and security of the telemedicine visit  I informed the patient that I have reviewed her record in Epic and presented the opportunity for her to ask any questions regarding the visit today  The patient agreed to participate  It was my intent to perform this visit via video technology but the patient was not able to do a video connection so the visit was completed via audio telephone only  Verification of patient location:  Patient is located in the following state in which I hold an active license: [place]    Subjective:   Leonardo Castrejon is a 74 y o  female who has been screened for COVID-19  Symptom change since last report: improving  Patient's symptoms include nasal congestion, rhinorrhea, sore throat, cough (productive ) and myalgias  Patient denies fever, chills, fatigue, malaise, anosmia, loss of taste, shortness of breath, chest tightness, abdominal pain, nausea, vomiting, diarrhea and headaches  Date of symptom onset: 12/29/2021  Date of exposure: 12/5/2021  Date of positive COVID-19 PCR: 12/30/2021  COVID-19 vaccination status: Not vaccinated    Lisa William has been staying home and has isolated themselves in her home  She is taking care to not share personal items and is cleaning all surfaces that are touched often, like counters, tabletops, and doorknobs using household cleaning sprays or wipes  She is wearing a mask when she leaves her room  Patient is reporting symptoms of intermittent productive cough, rhinorrhea, nasal congestion, sore throat, and myalgias  Patient denies any fevers or shortness of breath    Patient reports she did  prednisone but has not begun to take the medication yet  Patient also reports that she was given hydroxychloroquine by a friend that she has been taking over the past 3 days  Patient was advised that this medication is not FDA approved for treatment COVID however, patient reported she is going to continue to take the medication  Patient was advised that prednisone and hydroxychloroquine can be taken together safely  Patient also canceled her monoclonal antibody infusion  Lab Results   Component Value Date    SARSCOV2 Positive (A) 12/30/2021    Jak Reap Not Detected 04/20/2020     Past Medical History:   Diagnosis Date    Anxiety     Depression     Uncomplicated alcohol abuse      Past Surgical History:   Procedure Laterality Date    BREAST BIOPSY Right     benign    INCISIONAL BREAST BIOPSY       Current Outpatient Medications   Medication Sig Dispense Refill    atorvastatin (LIPITOR) 10 mg tablet Take 1 tablet (10 mg total) by mouth daily 90 tablet 1    benzonatate (TESSALON) 200 MG capsule Take 1 capsule (200 mg total) by mouth 3 (three) times a day as needed for cough 20 capsule 0    cholecalciferol (VITAMIN D3) 1,000 units tablet Take 1 tablet (1,000 Units total) by mouth daily 30 tablet 11    Cholestyramine (QUESTRAN PO) Take by mouth      escitalopram (LEXAPRO) 10 mg tablet TAKE 1 TABLET BY MOUTH EVERY DAY 90 tablet 1    estradiol (ESTRACE) 0 1 mg/g vaginal cream Insert 2 g into the vagina      fluticasone (FLONASE) 50 mcg/act nasal spray 2 sprays into each nostril daily (Patient not taking: Reported on 12/8/2021 )      guaiFENesin (MUCINEX) 600 mg 12 hr tablet Take 2 tablets (1,200 mg total) by mouth every 12 (twelve) hours 60 tablet 0    Multiple Vitamins-Minerals (multivitamin with minerals) tablet Take 1 tablet by mouth daily      predniSONE 10 mg tablet Use 5 tablets for 2 days  Use 4 tablets for 2 days  Use 3 tablets for 2 days  Use 2 tablets for 2 days   Use 1 tablet for 2 days  30 tablet 0    Sodium Chloride-Xylitol (Xlear Sinus Care Spray) SOLN into each nostril      Zinc Sulfate (ZINC 15 PO) Take by mouth       No current facility-administered medications for this visit  Allergies   Allergen Reactions    Alcohol - Food Allergy Confusion and Delirium     In Recovery    Pravastatin Other (See Comments)     Felt ill    Zithromax [Azithromycin] Diarrhea and Rash       Review of Systems   Constitutional: Negative for chills, fatigue and fever  HENT: Positive for congestion, rhinorrhea and sore throat  Eyes: Negative  Respiratory: Positive for cough (productive )  Negative for chest tightness and shortness of breath  Cardiovascular: Negative  Gastrointestinal: Negative for abdominal pain, diarrhea, nausea and vomiting  Endocrine: Negative  Genitourinary: Negative  Musculoskeletal: Positive for myalgias  Skin: Negative  Allergic/Immunologic: Negative  Neurological: Negative for headaches  Hematological: Negative  Psychiatric/Behavioral: Negative  Objective: There were no vitals filed for this visit  Physical Exam  Vitals reviewed: limited due to phone only exam    Neurological:      Mental Status: She is alert  VIRTUAL VISIT [address] verbally agrees to participate in [employer]  Pt is aware that [employer] could be limited without vital signs or the ability to perform a full hands-on physical Melissalili Bakari understands she or the provider may request at any time to terminate the video visit and request the patient to seek care or treatment in person

## 2022-01-06 ENCOUNTER — TELEMEDICINE (OUTPATIENT)
Dept: FAMILY MEDICINE CLINIC | Facility: CLINIC | Age: 67
End: 2022-01-06
Payer: MEDICARE

## 2022-01-06 DIAGNOSIS — U07.1 COVID-19 VIRUS INFECTION: Primary | ICD-10-CM

## 2022-01-06 PROCEDURE — 99442 PR PHYS/QHP TELEPHONE EVALUATION 11-20 MIN: CPT | Performed by: NURSE PRACTITIONER

## 2022-01-06 NOTE — ASSESSMENT & PLAN NOTE
6Jan: Day 8  Patient's symptoms are mild at this point  I will follow-up with patient again on 01/07/2022

## 2022-01-06 NOTE — PATIENT INSTRUCTIONS
Problem List Items Addressed This Visit        Other    COVID-19 virus infection - Primary     6Jan: Day 8  Patient's symptoms are mild at this point  I will follow-up with patient again on 01/07/2022  [address]    Your healthcare provider and/or public health staff have evaluated you and have determined that you do not need to remain in the hospital at this time  At this time you can be isolated at home where you will be monitored by staff from your local or state health department  You should carefully follow the prevention and isolation steps below until a healthcare provider or local or state health department says that you can return to your normal activities  Stay home except to get medical care    People who are mildly ill with COVID-19 are able to isolate at home during their illness  You should restrict activities outside your home, except for getting medical care  Do not go to work, school, or public areas  Avoid using public transportation, ride-sharing, or taxis  Separate yourself from other people and animals in your home    People: As much as possible, you should stay in a specific room and away from other people in your home  Also, you should use a separate bathroom, if available  Animals: You should restrict contact with pets and other animals while you are sick with COVID-19, just like you would around other people  Although there have not been reports of pets or other animals becoming sick with COVID-19, it is still recommended that people sick with COVID-19 limit contact with animals until more information is known about the virus  When possible, have another member of your household care for your animals while you are sick  If you are sick with COVID-19, avoid contact with your pet, including petting, snuggling, being kissed or licked, and sharing food   If you must care for your pet or be around animals while you are sick, wash your hands before and after you interact with pets and wear a facemask  See COVID-19 and Animals for more information  Call ahead before visiting your doctor    If you have a medical appointment, call the healthcare provider and tell them that you have or may have COVID-19  This will help the healthcare providers office take steps to keep other people from getting infected or exposed  Wear a facemask    You should wear a facemask when you are around other people (e g , sharing a room or vehicle) or pets and before you enter a healthcare providers office  If you are not able to wear a facemask (for example, because it causes trouble breathing), then people who live with you should not stay in the same room with you, or they should wear a facemask if they enter your room  Cover your coughs and sneezes    Cover your mouth and nose with a tissue when you cough or sneeze  Throw used tissues in a lined trash can  Immediately wash your hands with soap and water for at least 20 seconds or, if soap and water are not available, clean your hands with an alcohol-based hand  that contains at least 60% alcohol  Clean your hands often    Wash your hands often with soap and water for at least 20 seconds, especially after blowing your nose, coughing, or sneezing; going to the bathroom; and before eating or preparing food  If soap and water are not readily available, use an alcohol-based hand  with at least 60% alcohol, covering all surfaces of your hands and rubbing them together until they feel dry  Soap and water are the best option if hands are visibly dirty  Avoid touching your eyes, nose, and mouth with unwashed hands  Avoid sharing personal household items    You should not share dishes, drinking glasses, cups, eating utensils, towels, or bedding with other people or pets in your home  After using these items, they should be washed thoroughly with soap and water      Clean all high-touch surfaces everyday    High touch surfaces include counters, tabletops, doorknobs, bathroom fixtures, toilets, phones, keyboards, tablets, and bedside tables  Also, clean any surfaces that may have blood, stool, or body fluids on them  Use a household cleaning spray or wipe, according to the label instructions  Labels contain instructions for safe and effective use of the cleaning product including precautions you should take when applying the product, such as wearing gloves and making sure you have good ventilation during use of the product  Monitor your symptoms    Seek prompt medical attention if your illness is worsening (e g , difficulty breathing)  Before seeking care, call your healthcare provider and tell them that you have, or are being evaluated for, COVID-19  Put on a facemask before you enter the facility  These steps will help the healthcare providers office to keep other people in the office or waiting room from getting infected or exposed  Ask your healthcare provider to call the local or [facility] health department  Persons who are placed under active monitoring or facilitated self-monitoring should follow instructions provided by their local health department or occupational health professionals, as appropriate  If you have a medical emergency and need to call 911, notify the dispatch personnel that you have, or are being evaluated for COVID-19  If possible, put on a facemask before emergency medical services arrive      Discontinuing home isolation    Patients with confirmed COVID-19 should remain under home isolation precautions until the following conditions are met:   - They have had no fever for at least 24 hours (that is one full day of no fever without the use medicine that reduces fevers)  AND  - other symptoms have improved (for example, when their cough or shortness of breath have improved)  AND  - If had mild or moderate illness, at least 10 days have passed since their symptoms first appeared or if severe illness (needed oxygen) or immunosuppressed, at least 20 days have passed since symptoms first appeared  Patients with confirmed COVID-19 should also notify close contacts (including their workplace) and ask that they self-quarantine  Currently, close contact is defined as being within 6 feet for 15 minutes or more from the period 24 hours starting 48 hours before symptom onset to the time at which the patient went into isolation  Close contacts of patients diagnosed with COVID-19 should be instructed by the patient to self-quarantine for 14 days from the last time of their last contact with the patient       Source: [employer]

## 2022-01-06 NOTE — PROGRESS NOTES
COVID-19 Outpatient Progress Note    Assessment/Plan:    Problem List Items Addressed This Visit        Other    COVID-19 virus infection - Primary     6Jan: Day 8  Patient's symptoms are mild at this point  I will follow-up with patient again on 01/07/2022  Disposition:     I recommended continued isolation until at least 24 hours have passed since recovery defined as resolution of fever without the use of fever-reducing medications AND improvement in COVID symptoms AND 10 days have passed since onset of symptoms (or 10 days have passed since date of first positive viral diagnostic test for asymptomatic patients)  I have spent 15 minutes directly with the patient  Encounter provider BRIAN Webb    Provider located at [address]  [address] [facility] [address] [place] [id number] [phone]    Recent Visits  Date Type Provider Dept   01/04/22 Telemedicine BRIAN Webb [facility]   01/02/22 Telemedicine Shiva Howard, [address] [facility]   12/30/21 Telemedicine [facility]   Showing recent visits within past 7 days and meeting all other requirements  Today's Visits  Date Type Provider Dept   01/06/22 Telemedicine [facility]   Showing today's visits and meeting all other requirements  Future Appointments  No visits were found meeting these conditions  Showing future appointments within next 150 days and meeting all other requirements     This virtual check-in was done via telephone and she agrees to proceed  Patient agrees to participate in a virtual check in via telephone or video visit instead of presenting to the office to address urgent/immediate medical needs  Patient is aware this is a billable service  After connecting through Telephone, the patient was identified by name and date of birth   Jose Needle was informed that this was a telemedicine visit and that the exam was being conducted confidentially over secure lines  My office door was closed  No one else was in the room  Kip Castillo acknowledged consent and understanding of privacy and security of the telemedicine visit  I informed the patient that I have reviewed her record in Epic and presented the opportunity for her to ask any questions regarding the visit today  The patient agreed to participate  It was my intent to perform this visit via video technology but the patient was not able to do a video connection so the visit was completed via audio telephone only  Verification of patient location:  Patient is located in the following state in which I hold an active license: [place]    Subjective:   Kip Castillo is a 74 y o  female who has been screened for COVID-19  Symptom change since last report: improving  Patient's symptoms include fatigue, malaise, sore throat and cough (non-productive )  Patient denies fever, chills, congestion, rhinorrhea, anosmia, loss of taste, shortness of breath, chest tightness, abdominal pain, nausea, vomiting, diarrhea, myalgias and headaches  Date of symptom onset: 12/29/2021  Date of exposure: 12/5/2021  Date of positive COVID-19 PCR: 12/30/2021  COVID-19 vaccination status: Not vaccinated    Ishmael Lizama has been staying home and has isolated themselves in her home  She is taking care to not share personal items and is cleaning all surfaces that are touched often, like counters, tabletops, and doorknobs using household cleaning sprays or wipes  She is wearing a mask when she leaves her room  Patient is reporting symptoms of intermittent nonproductive cough, sore throat, and increased fatigue  Patient denies any fevers or shortness of breath      Lab Results   Component Value Date    SARSCOV2 Positive (A) 12/30/2021    Beltran Basket Not Detected 04/20/2020     Past Medical History:   Diagnosis Date    Anxiety     Depression     Uncomplicated alcohol abuse      Past Surgical History:   Procedure Laterality Date    BREAST BIOPSY Right     benign    INCISIONAL BREAST BIOPSY       Current Outpatient Medications   Medication Sig Dispense Refill    atorvastatin (LIPITOR) 10 mg tablet Take 1 tablet (10 mg total) by mouth daily 90 tablet 1    benzonatate (TESSALON) 200 MG capsule Take 1 capsule (200 mg total) by mouth 3 (three) times a day as needed for cough 20 capsule 0    cholecalciferol (VITAMIN D3) 1,000 units tablet Take 1 tablet (1,000 Units total) by mouth daily 30 tablet 11    Cholestyramine (QUESTRAN PO) Take by mouth      escitalopram (LEXAPRO) 10 mg tablet TAKE 1 TABLET BY MOUTH EVERY DAY 90 tablet 1    estradiol (ESTRACE) 0 1 mg/g vaginal cream Insert 2 g into the vagina      fluticasone (FLONASE) 50 mcg/act nasal spray 2 sprays into each nostril daily (Patient not taking: Reported on 12/8/2021 )      guaiFENesin (MUCINEX) 600 mg 12 hr tablet Take 2 tablets (1,200 mg total) by mouth every 12 (twelve) hours 60 tablet 0    Multiple Vitamins-Minerals (multivitamin with minerals) tablet Take 1 tablet by mouth daily      predniSONE 10 mg tablet Use 5 tablets for 2 days  Use 4 tablets for 2 days  Use 3 tablets for 2 days  Use 2 tablets for 2 days  Use 1 tablet for 2 days  30 tablet 0    Sodium Chloride-Xylitol (Xlear Sinus Care Spray) SOLN into each nostril      Zinc Sulfate (ZINC 15 PO) Take by mouth       No current facility-administered medications for this visit  Allergies   Allergen Reactions    Alcohol - Food Allergy Confusion and Delirium     In Recovery    Pravastatin Other (See Comments)     Felt ill    Zithromax [Azithromycin] Diarrhea and Rash       Review of Systems   Constitutional: Positive for fatigue  Negative for chills and fever  HENT: Positive for sore throat  Negative for congestion and rhinorrhea  Eyes: Negative  Respiratory: Positive for cough (non-productive )   Negative for chest tightness and shortness of breath  Cardiovascular: Negative  Gastrointestinal: Negative for abdominal pain, diarrhea, nausea and vomiting  Endocrine: Negative  Genitourinary: Negative  Musculoskeletal: Negative for myalgias  Skin: Negative  Allergic/Immunologic: Negative  Neurological: Negative for headaches  Hematological: Negative  Psychiatric/Behavioral: Negative  Objective: There were no vitals filed for this visit  Physical Exam  Vitals reviewed: limited due to phone only exam    Neurological:      Mental Status: She is alert  VIRTUAL VISIT [address] verbally agrees to participate in [employer]  Pt is aware that [employer] could be limited without vital signs or the ability to perform a full hands-on physical Elzbieta Gonsalez understands she or the provider may request at any time to terminate the video visit and request the patient to seek care or treatment in person

## 2022-01-11 ENCOUNTER — TELEPHONE (OUTPATIENT)
Dept: FAMILY MEDICINE CLINIC | Facility: CLINIC | Age: 67
End: 2022-01-11

## 2022-01-11 DIAGNOSIS — Z20.822 ENCOUNTER BY TELEHEALTH FOR SUSPECTED COVID-19: ICD-10-CM

## 2022-01-11 RX ORDER — BENZONATATE 200 MG/1
200 CAPSULE ORAL 3 TIMES DAILY PRN
Qty: 20 CAPSULE | Refills: 0 | Status: SHIPPED | OUTPATIENT
Start: 2022-01-11 | End: 2022-06-30

## 2022-01-11 NOTE — TELEPHONE ENCOUNTER
Tessalon was reordered  It is alarming that patient had a previous reaction to Phenergan DM  Has patient ever taken this medication before?

## 2022-01-11 NOTE — TELEPHONE ENCOUNTER
Pt would like a refill on  Tessalon and Promethazine DM  She states " we are still suffering with a cough from COVID" ? She asked we ask Dr Shabana Perez but you were the one to see her for COVID so I found it more appropriate to ask you

## 2022-01-11 NOTE — TELEPHONE ENCOUNTER
Pt called in because she will like a refill on the following because she still has a cough for benzonatate and guaifenesin    Please advise

## 2022-01-12 DIAGNOSIS — U07.1 COVID-19 VIRUS INFECTION: Primary | ICD-10-CM

## 2022-01-12 RX ORDER — DEXTROMETHORPHAN HYDROBROMIDE AND PROMETHAZINE HYDROCHLORIDE 15; 6.25 MG/5ML; MG/5ML
2.5 SOLUTION ORAL 4 TIMES DAILY PRN
Qty: 118 ML | Refills: 0 | Status: SHIPPED | OUTPATIENT
Start: 2022-01-12 | End: 2022-06-30

## 2022-01-12 NOTE — TELEPHONE ENCOUNTER
Patient notified  She has no idea what you are talking about with the reaction to Phenergan DM  She said she would like the cough syrup that you prescribed before, she said it really works

## 2022-03-14 DIAGNOSIS — F31.9 BIPOLAR 1 DISORDER (HCC): ICD-10-CM

## 2022-03-14 RX ORDER — ESCITALOPRAM OXALATE 10 MG/1
TABLET ORAL
Qty: 90 TABLET | Refills: 1 | Status: SHIPPED | OUTPATIENT
Start: 2022-03-14

## 2022-03-14 NOTE — TELEPHONE ENCOUNTER
Requested Prescriptions     Pending Prescriptions Disp Refills    escitalopram (LEXAPRO) 10 mg tablet [Pharmacy Med Name: ESCITALOPRAM 10 MG TABLET] 90 tablet 1     Sig: TAKE 1 TABLET BY MOUTH EVERY DAY     LOV 8/16/21, F/U non scheduled, Labs pending

## 2022-03-18 ENCOUNTER — APPOINTMENT (OUTPATIENT)
Dept: LAB | Facility: CLINIC | Age: 67
End: 2022-03-18
Payer: MEDICARE

## 2022-03-18 DIAGNOSIS — R73.9 HYPERGLYCEMIA: ICD-10-CM

## 2022-03-18 DIAGNOSIS — E78.00 PURE HYPERCHOLESTEROLEMIA: ICD-10-CM

## 2022-03-18 LAB
ALBUMIN SERPL BCP-MCNC: 4.1 G/DL (ref 3.5–5)
ALP SERPL-CCNC: 45 U/L (ref 46–116)
ALT SERPL W P-5'-P-CCNC: 33 U/L (ref 12–78)
ANION GAP SERPL CALCULATED.3IONS-SCNC: 4 MMOL/L (ref 4–13)
AST SERPL W P-5'-P-CCNC: 29 U/L (ref 5–45)
BILIRUB SERPL-MCNC: 0.79 MG/DL (ref 0.2–1)
BUN SERPL-MCNC: 11 MG/DL (ref 5–25)
CALCIUM SERPL-MCNC: 8.8 MG/DL (ref 8.3–10.1)
CHLORIDE SERPL-SCNC: 108 MMOL/L (ref 100–108)
CHOLEST SERPL-MCNC: 193 MG/DL
CO2 SERPL-SCNC: 29 MMOL/L (ref 21–32)
CREAT SERPL-MCNC: 0.6 MG/DL (ref 0.6–1.3)
GFR SERPL CREATININE-BSD FRML MDRD: 93 ML/MIN/1.73SQ M
GLUCOSE P FAST SERPL-MCNC: 88 MG/DL (ref 65–99)
HDLC SERPL-MCNC: 52 MG/DL
LDLC SERPL CALC-MCNC: 122 MG/DL (ref 0–100)
NONHDLC SERPL-MCNC: 141 MG/DL
POTASSIUM SERPL-SCNC: 4.2 MMOL/L (ref 3.5–5.3)
PROT SERPL-MCNC: 7.4 G/DL (ref 6.4–8.2)
SODIUM SERPL-SCNC: 141 MMOL/L (ref 136–145)
TRIGL SERPL-MCNC: 96 MG/DL

## 2022-03-18 PROCEDURE — 80061 LIPID PANEL: CPT

## 2022-03-18 PROCEDURE — 80053 COMPREHEN METABOLIC PANEL: CPT

## 2022-03-18 PROCEDURE — 36415 COLL VENOUS BLD VENIPUNCTURE: CPT

## 2022-06-19 DIAGNOSIS — E78.2 MIXED HYPERLIPIDEMIA: ICD-10-CM

## 2022-06-20 RX ORDER — ATORVASTATIN CALCIUM 10 MG/1
TABLET, FILM COATED ORAL
Qty: 90 TABLET | Refills: 1 | Status: SHIPPED | OUTPATIENT
Start: 2022-06-20

## 2022-06-30 ENCOUNTER — OFFICE VISIT (OUTPATIENT)
Dept: FAMILY MEDICINE CLINIC | Facility: CLINIC | Age: 67
End: 2022-06-30
Payer: MEDICARE

## 2022-06-30 VITALS
HEIGHT: 67 IN | HEART RATE: 70 BPM | BODY MASS INDEX: 25.43 KG/M2 | WEIGHT: 162 LBS | SYSTOLIC BLOOD PRESSURE: 122 MMHG | TEMPERATURE: 98.2 F | OXYGEN SATURATION: 95 % | DIASTOLIC BLOOD PRESSURE: 68 MMHG

## 2022-06-30 DIAGNOSIS — K58.2 IRRITABLE BOWEL SYNDROME WITH BOTH CONSTIPATION AND DIARRHEA: ICD-10-CM

## 2022-06-30 DIAGNOSIS — K57.90 DIVERTICULOSIS: ICD-10-CM

## 2022-06-30 DIAGNOSIS — Z12.31 ENCOUNTER FOR SCREENING MAMMOGRAM FOR MALIGNANT NEOPLASM OF BREAST: Primary | ICD-10-CM

## 2022-06-30 DIAGNOSIS — F31.9 BIPOLAR 1 DISORDER (HCC): ICD-10-CM

## 2022-06-30 DIAGNOSIS — F10.21 RECOVERING ALCOHOLIC IN REMISSION (HCC): ICD-10-CM

## 2022-06-30 PROBLEM — U07.1 COVID-19 VIRUS INFECTION: Status: RESOLVED | Noted: 2021-12-08 | Resolved: 2022-06-30

## 2022-06-30 PROBLEM — K58.9 IBS (IRRITABLE BOWEL SYNDROME): Status: ACTIVE | Noted: 2022-06-30

## 2022-06-30 PROCEDURE — 99214 OFFICE O/P EST MOD 30 MIN: CPT | Performed by: FAMILY MEDICINE

## 2022-06-30 RX ORDER — IBUPROFEN 600 MG/1
TABLET ORAL
COMMUNITY
Start: 2022-04-21

## 2022-06-30 RX ORDER — MONTELUKAST SODIUM 10 MG/1
10 TABLET ORAL EVERY EVENING
COMMUNITY
Start: 2022-05-19 | End: 2022-06-30

## 2022-06-30 RX ORDER — HYDROCODONE BITARTRATE AND ACETAMINOPHEN 5; 325 MG/1; MG/1
TABLET ORAL
COMMUNITY
Start: 2022-04-21 | End: 2022-06-30

## 2022-06-30 NOTE — PATIENT INSTRUCTIONS
Problem List Items Addressed This Visit       Bipolar 1 disorder [facility])     Patient has had for a long time  On Lexapro  Not seeing Psychiatry  Diverticulosis     Has diverticulosis  Has no symptoms of Diverticulitis  IBS (irritable bowel syndrome)     Likely IBS  This is based on symptoms  I would recommend increased fiber in diet  Further, could try Metamucil vs Konsyl, Citrucel, or PerDiem  No specific changes at this time  Recommend follow with GI at some point in the future  Colonoscopy 2020, 3 year repeat recommended  Reviewed about Levsin SL  Relevant Medications    hyoscyamine (LEVSIN/SL) 0 125 mg SL tablet    Recovering alcoholic in remission ([facility] [address] )     Still not drinking alcohol  Has 29 years sober now  Other Visit Diagnoses       Encounter for screening mammogram for malignant neoplasm of breast    -  Primary    Relevant Orders    Mammo screening bilateral w 3d & cad            COVID 19 Instructions    Anna Og was advised to limit contact with others to essential tasks such as getting food, medications, and medical care  Proper handwashing reviewed, and Hand sanitzer when washing is not available  If the patient develops symptoms of COVID 19, the patient should call the office as soon as possible  For 9629-7071 Flu season, it is strongly recommended that Flu Vaccinations be obtained  Virtual Visits:  Yue: This works on smart phones (any phone with Internet browsing capability)  You should get a text message when the provider is ready to see you  Click on the link in the text message, and the call should start  You will need to type in your name, and allow camera and microphone access  This is HIPPA compliant, and secure  If you have not already done so, get immunized to COVID 19        We are committed to getting you vaccinated as soon as possible and will be closely following [facility] and [place] guidelines as they are released and revised  Please refer to our COVID-19 vaccine webpage for the most up to date information on the vaccine and our distribution efforts  This site will also have the most up to date recommendations for COVID booster vaccine  Cb perry    Call [phone] ([phone]), option 7    OUR NEW LOCATION:    [address], [address] [place], [place] [address] [address] , [place], [address]  Fax: [phone]    Lab services and OB/GYN are at this location as well

## 2022-06-30 NOTE — PROGRESS NOTES
Assessment and Plan:    Problem List Items Addressed This Visit     Bipolar 1 disorder [facility])     Patient has had for a long time  On Lexapro  Not seeing Psychiatry  Diverticulosis     Has diverticulosis  Has no symptoms of Diverticulitis  IBS (irritable bowel syndrome)     Likely IBS  This is based on symptoms  I would recommend increased fiber in diet  Further, could try Metamucil vs Konsyl, Citrucel, or PerDiem  No specific changes at this time  Recommend follow with GI at some point in the future  Colonoscopy 2020, 3 year repeat recommended  Reviewed about Levsin SL  Relevant Medications    hyoscyamine (LEVSIN/SL) 0 125 mg SL tablet    Recovering alcoholic in remission ([facility] [address] )     Still not drinking alcohol  Has 29 years sober now  Other Visit Diagnoses     Encounter for screening mammogram for malignant neoplasm of breast    -  Primary    Relevant Orders    Mammo screening bilateral w 3d & cad                 Diagnoses and all orders for this visit:    Encounter for screening mammogram for malignant neoplasm of breast  -     Mammo screening bilateral w 3d & cad; Future    Diverticulosis    Irritable bowel syndrome with both constipation and diarrhea  -     hyoscyamine (LEVSIN/SL) 0 125 mg SL tablet; Take 1 tablet (0 125 mg total) by mouth every 4 (four) hours as needed for cramping    Bipolar 1 disorder ([facility] [address] )    Recovering alcoholic in remission ([facility] [address] )    Other orders  -     ibuprofen (MOTRIN) 600 mg tablet; TAKE 1 TABLET BY MOUTH EVERY 6 HOURS FOR FIRST 48 HOURS THEN AS NEEDED FOR PAIN  -     Discontinue: HYDROcodone-acetaminophen (NORCO) 5-325 mg per tablet; TAKE 1 TABLET BY MOUTH EVERY 6 HOURS AS NEEDED FOR SEVERE PAIN ONLY (Patient not taking: Reported on 6/30/2022)  -     Discontinue: montelukast (SINGULAIR) 10 mg tablet;  Take 10 mg by mouth every evening (Patient not taking: Reported on 6/30/2022)            Subjective:      Patient ID: Alie Escobar is a 74 y o  female  CC:    Chief Complaint   Patient presents with    Diverticulitis     Patient present today for possible  diverticulitis flare up due to not having a healthy diet  HPI:    Patient here to follow up on multiple issues  She is complaining today about possible diverticulitis  She has been having some issues with abdominal pain  Has diverticulosis  She is severely limiting seeds in her diet as she was told before  She did mention that she had eaten a salad recently, and noted some of the foods that passed without seeming to have digested  Had some loose stools with this as well, and occasional cramping in the abdomen  Her pains are better after BM or pass gas  Pain then returns as well            The following portions of the patient's history were reviewed and updated as appropriate: allergies, current medications, past family history, past medical history, past social history, past surgical history and problem list       Review of Systems      Data to review:       Objective:    Vitals:    06/30/22 1516   BP: 122/68   BP Location: Left arm   Patient Position: Sitting   Cuff Size: Standard   Pulse: 70   Temp: 98 2 °F (36 8 °C)   TempSrc: Tympanic   SpO2: 95%   Weight: 73 5 kg (162 lb)   Height: 5' 7" (1 702 m)        Physical Exam

## 2022-06-30 NOTE — ASSESSMENT & PLAN NOTE
Likely IBS  This is based on symptoms  I would recommend increased fiber in diet  Further, could try Metamucil vs Konsyl, Citrucel, or PerDiem  No specific changes at this time  Recommend follow with GI at some point in the future  Colonoscopy 2020, 3 year repeat recommended  Reviewed about Wisam SL

## 2022-07-19 ENCOUNTER — OFFICE VISIT (OUTPATIENT)
Dept: FAMILY MEDICINE CLINIC | Facility: CLINIC | Age: 67
End: 2022-07-19
Payer: MEDICARE

## 2022-07-19 VITALS
HEART RATE: 60 BPM | BODY MASS INDEX: 24.8 KG/M2 | HEIGHT: 67 IN | DIASTOLIC BLOOD PRESSURE: 60 MMHG | SYSTOLIC BLOOD PRESSURE: 110 MMHG | WEIGHT: 158 LBS

## 2022-07-19 DIAGNOSIS — K58.2 IRRITABLE BOWEL SYNDROME WITH BOTH CONSTIPATION AND DIARRHEA: Primary | ICD-10-CM

## 2022-07-19 PROCEDURE — 99213 OFFICE O/P EST LOW 20 MIN: CPT | Performed by: FAMILY MEDICINE

## 2022-07-19 RX ORDER — COLESEVELAM HYDROCHLORIDE 3.75 G/1
3.75 POWDER, FOR SUSPENSION ORAL DAILY
Qty: 30 EACH | Refills: 0 | Status: SHIPPED | OUTPATIENT
Start: 2022-07-19 | End: 2022-08-12

## 2022-07-19 NOTE — PROGRESS NOTES
Assessment and Plan:    Problem List Items Addressed This Visit     IBS (irritable bowel syndrome) - Primary     Patient does have irritable bowel syndrome  It does appear to be getting the better of her recently  She did mention that the hyoscyamine seemed to help out for the cramps, but not for the loose stools  Would recommend fiber, stay hydrated  Add Welchol  Start Wednesday  Agree with GI  Relevant Medications    Colesevelam HCl 3 75 g PACK    Other Relevant Orders    Ambulatory Referral to Gastroenterology                 Diagnoses and all orders for this visit:    Irritable bowel syndrome with both constipation and diarrhea  -     Ambulatory Referral to Gastroenterology; Future  -     Colesevelam HCl 3 75 g PACK; Take 3 75 g by mouth daily            Subjective:      Patient ID: Allison Valente is a 69 y o  female  CC:    Chief Complaint   Patient presents with    Irritable Bowel Syndrome     Patient c/o liquid/loose BM's since Sunday after eating out at [employer]  Patient would like to be referred to [facility]  ak       HPI:    Had been doing well with BM  Would have some issues at times  Recently went out for dinner, and noted severe issues with IBS symptoms  Had liquid BM, and pains, frequent BM as well  With Flatus, she also noted some issues with passing loose stools  She wondered about seeing GI as she is going to a wedding soon  The following portions of the patient's history were reviewed and updated as appropriate: allergies, current medications, past family history, past medical history, past social history, past surgical history and problem list       Review of Systems   Constitutional: Negative  HENT: Negative  Respiratory: Negative  Gastrointestinal: Positive for diarrhea           Data to review:       Objective:    Vitals:    07/19/22 1023   BP: 110/60   Pulse: 60   Weight: 71 7 kg (158 lb)   Height: 5' 7" (1 702 m)        Physical Exam  Vitals and nursing note reviewed  Constitutional:       Appearance: Normal appearance  HENT:      Head: Normocephalic  Cardiovascular:      Rate and Rhythm: Normal rate and regular rhythm  Pulses: Normal pulses  Carotid pulses are 2+ on the right side and 2+ on the left side  Heart sounds: Normal heart sounds  No murmur heard  No friction rub  No gallop  Pulmonary:      Effort: Pulmonary effort is normal  No respiratory distress  Breath sounds: Normal breath sounds  No stridor  No wheezing, rhonchi or rales  Chest:      Chest wall: No tenderness  Abdominal:      General: Abdomen is flat  Bowel sounds are normal  There is no distension  Palpations: There is no mass  Tenderness: Tenderness: minor lower abdomen tender  Neurological:      Mental Status: She is alert

## 2022-07-19 NOTE — PATIENT INSTRUCTIONS
Problem List Items Addressed This Visit       IBS (irritable bowel syndrome) - Primary     Patient does have irritable bowel syndrome  It does appear to be getting the better of her recently  She did mention that the hyoscyamine seemed to help out for the cramps, but not for the loose stools  Would recommend fiber, stay hydrated  Add Welchol  Start Wednesday  Agree with GI  Relevant Medications    Colesevelam HCl 3 75 g PACK    Other Relevant Orders    Ambulatory Referral to Gastroenterology            COVID 19 Instructions    Sridevihailey MccloudSolo was advised to limit contact with others to essential tasks such as getting food, medications, and medical care  Proper handwashing reviewed, and Hand sanitzer when washing is not available  If the patient develops symptoms of COVID 19, the patient should call the office as soon as possible  For 8783-3497 Flu season, it is strongly recommended that Flu Vaccinations be obtained  Virtual Visits:  Yue: This works on smart phones (any phone with Internet browsing capability)  You should get a text message when the provider is ready to see you  Click on the link in the text message, and the call should start  You will need to type in your name, and allow camera and microphone access  This is HIPPA compliant, and secure  If you have not already done so, get immunized to COVID 19  We are committed to getting you vaccinated as soon as possible and will be closely following CDC and [facility]  guidelines as they are released and revised  Please refer to our COVID-19 vaccine webpage for the most up to date information on the vaccine and our distribution efforts  This site will also have the most up to date recommendations for COVID booster vaccine      [facility] t[place]    Call [phone] ([phone]), option 7    OUR NEW LOCATION:    [place] [address], [facility] [place], [place], [address]  Fax: [phone]    Lab services and OB/GYN are at this location as well

## 2022-07-19 NOTE — ASSESSMENT & PLAN NOTE
Patient does have irritable bowel syndrome  It does appear to be getting the better of her recently  She did mention that the hyoscyamine seemed to help out for the cramps, but not for the loose stools  Would recommend fiber, stay hydrated  Add Welchol  Start Wednesday    Agree with GI

## 2022-07-21 ENCOUNTER — TELEPHONE (OUTPATIENT)
Dept: OTHER | Facility: OTHER | Age: 67
End: 2022-07-21

## 2022-07-25 ENCOUNTER — TELEPHONE (OUTPATIENT)
Dept: OTHER | Facility: OTHER | Age: 67
End: 2022-07-25

## 2022-07-25 NOTE — TELEPHONE ENCOUNTER
Patient would like to move her appointment with Sharda Soto from 8/23 to an earlier date if possible  Please get in touch with her to discuss

## 2022-07-27 ENCOUNTER — CONSULT (OUTPATIENT)
Dept: GASTROENTEROLOGY | Facility: CLINIC | Age: 67
End: 2022-07-27
Payer: MEDICARE

## 2022-07-27 VITALS
BODY MASS INDEX: 24.8 KG/M2 | TEMPERATURE: 98.8 F | RESPIRATION RATE: 18 BRPM | OXYGEN SATURATION: 97 % | DIASTOLIC BLOOD PRESSURE: 68 MMHG | WEIGHT: 158 LBS | SYSTOLIC BLOOD PRESSURE: 116 MMHG | HEIGHT: 67 IN | HEART RATE: 71 BPM

## 2022-07-27 DIAGNOSIS — K21.9 GASTROESOPHAGEAL REFLUX DISEASE, UNSPECIFIED WHETHER ESOPHAGITIS PRESENT: Primary | ICD-10-CM

## 2022-07-27 DIAGNOSIS — K58.2 IRRITABLE BOWEL SYNDROME WITH BOTH CONSTIPATION AND DIARRHEA: ICD-10-CM

## 2022-07-27 DIAGNOSIS — K57.90 DIVERTICULOSIS: ICD-10-CM

## 2022-07-27 PROCEDURE — 99204 OFFICE O/P NEW MOD 45 MIN: CPT | Performed by: INTERNAL MEDICINE

## 2022-07-27 NOTE — PROGRESS NOTES
[facility] - Outpatient Consultation  Dakota Ventura 69 y o  female MRN: [id number]  Encounter: [id number]          ASSESSMENT AND PLAN:      1  Irritable bowel syndrome with both constipation and diarrhea  - Ambulatory Referral to Gastroenterology  - Celiac Disease Panel; Future  - Giardia Antigen with Reflex to Ova and Parasites; Future  - Calprotectin,Fecal; Future  2  Gastroesophageal reflux disease, unspecified whether esophagitis present  Well controlled, continue as needed antacids  3  Diverticulosis    33-year-old female presenting for 3 week duration of fluctuating bowel habits  While this is short duration of symptoms, if this persists for longer than 6 weeks, suspect that this may be evolving irritable bowel syndrome  Other possibilities include celiac disease, less likely inflammatory bowel disease, or microscopic colitis  Will plan to check celiac disease panel, Giardia, fecal calprotectin  I have also recommended that she do a complete dairy elimination to assess if this improves her symptoms  Thereafter if no improvement, could consider FODMAP elimination  Depending on the chronicity of her symptoms, will consider colonoscopy with random biopsies of the colon to rule out microscopic colitis  We will discuss this at her follow-up appointment    ______________________________________________________________________    HPI:  33-year-old female with acid reflux, diverticulosis, depression on SSRI, presenting for evaluation of roughly 3 weeks duration of fluctuating bowel habits  Over the past few weeks she has noted that at times she can be constipated but then had the sudden urge to use the bathroom but very little comes out  Then other times she will have urgency and has actually had an episode of fecal incontinence  She has not had any rectal bleeding  Bowel movements are preceded by lower abdominal cramping that is relieved after having a bowel movement  Prior to 3 weeks ago, she denies having regular problems with bowel habit changes  She had a colonoscopy in 2020, at which time she had a 1 cm polyp, recommended 3 year follow-up  She has tried fiber in the past, but no improvement  She is unsure what may be triggering her symptoms and has tried a few dietary changes without much improvement  She also feels that she has had a few stressful events including family events that have triggered pain and change in bowel habits  REVIEW OF SYSTEMS:    CONSTITUTIONAL: Denies any fever, chills, rigors, and weight loss  HEENT: Denies odynophagia, tinnitus  CARDIOVASCULAR: No chest pain or palpitations  RESPIRATORY: Denies any cough, hemoptysis, shortness of breath or dyspnea on exertion  GASTROINTESTINAL: As noted in the History of Present Illness  GENITOURINARY: No problems with urination  Denies any hematuria or dysuria  NEUROLOGIC: No dizziness or vertigo, denies headaches  MUSCULOSKELETAL: Denies any muscle or joint pain  SKIN: Denies skin rashes or itching  ENDOCRINE:  Denies intolerance to heat or cold  PSYCHOSOCIAL: Denies depression or anxiety  Denies any recent memory loss  Historical Information   Past Medical History:   Diagnosis Date    Anxiety     COVID-19 virus infection 12/8/2021    Symptoms began 12/29       Depression     Uncomplicated alcohol abuse      Past Surgical History:   Procedure Laterality Date    BREAST BIOPSY Right     benign    INCISIONAL BREAST BIOPSY       Social History   Social History     Substance and Sexual Activity   Alcohol Use Not Currently     Social History     Substance and Sexual Activity   Drug Use No     Social History     Tobacco Use   Smoking Status Former Smoker   Smokeless Tobacco Never Used     Family History   Problem Relation Age of Onset    Breast cancer Mother     Alcohol abuse Father     Coronary artery disease Father     Alcohol abuse Brother     Bipolar disorder Brother     Alcohol abuse Brother     No Known Problems Sister     No Known Problems Maternal Grandmother     No Known Problems Maternal Grandfather     No Known Problems Paternal Grandmother     No Known Problems Paternal Grandfather     No Known Problems Maternal Aunt     No Known Problems Maternal Aunt     Cancer Maternal Aunt     No Known Problems Paternal Aunt     No Known Problems Paternal Aunt        Meds/Allergies       Current Outpatient Medications:     atorvastatin (LIPITOR) 10 mg tablet    Colesevelam HCl 3 75 g PACK    escitalopram (LEXAPRO) 10 mg tablet    estradiol (ESTRACE) 0 1 mg/g vaginal cream    hyoscyamine (LEVSIN/SL) 0 125 mg SL tablet    ibuprofen (MOTRIN) 600 mg tablet    Multiple Vitamins-Minerals (multivitamin with minerals) tablet    Sodium Chloride-Xylitol (Xlear Sinus Care Spray) SOLN    Zinc Sulfate (ZINC 15 PO)    cholecalciferol (VITAMIN D3) 1,000 units tablet    Allergies   Allergen Reactions    Alcohol - Food Allergy Confusion and Delirium     In Recovery    Pravastatin Other (See Comments)     Felt ill    Singulair [Montelukast] Other (See Comments)     Ineffective      Zithromax [Azithromycin] Diarrhea and Rash           Objective     Blood pressure 116/68, pulse 71, temperature 98 8 °F (37 1 °C), temperature source Tympanic, resp  rate 18, height 5' 7" (1 702 m), weight 71 7 kg (158 lb), SpO2 97 %, not currently breastfeeding  Body mass index is 24 75 kg/m²  PHYSICAL EXAM:      General Appearance:   Alert, cooperative, no distress   HEENT:   Normocephalic, atraumatic, anicteric  Neck:  Supple, symmetrical, trachea midline   Lungs:   Clear to auscultation bilaterally; no rales, rhonchi or wheezing; respirations unlabored    Heart[de-identified]   Regular rate and rhythm; no murmur, rub, or gallop     Abdomen:   Soft, non-tender, non-distended; normal bowel sounds; no masses, no organomegaly    Genitalia:   Deferred    Rectal:   Deferred    Extremities:  No cyanosis, clubbing or edema    Pulses:  2+ and symmetric    Skin:  No jaundice, rashes, or lesions          Lab Results:   No visits with results within 1 Day(s) from this visit  Latest known visit with results is:   Appointment on 03/18/2022   Component Date Value    Cholesterol 03/18/2022 193     Triglycerides 03/18/2022 96     HDL, Direct 03/18/2022 52     LDL Calculated 03/18/2022 122 (A)    Non-HDL-Chol (CHOL-HDL) 03/18/2022 141     Sodium 03/18/2022 141     Potassium 03/18/2022 4 2     Chloride 03/18/2022 108     CO2 03/18/2022 29     ANION GAP 03/18/2022 4     BUN 03/18/2022 11     Creatinine 03/18/2022 0 60     Glucose, Fasting 03/18/2022 88     Calcium 03/18/2022 8 8     AST 03/18/2022 29     ALT 03/18/2022 33     Alkaline Phosphatase 03/18/2022 45 (A)    Total Protein 03/18/2022 7 4     Albumin 03/18/2022 4 1     Total Bilirubin 03/18/2022 0 79     eGFR 03/18/2022 93          Radiology Results:   No results found

## 2022-07-29 ENCOUNTER — APPOINTMENT (OUTPATIENT)
Dept: LAB | Facility: MEDICAL CENTER | Age: 67
End: 2022-07-29
Payer: MEDICARE

## 2022-07-29 DIAGNOSIS — F31.9 BIPOLAR 1 DISORDER (HCC): ICD-10-CM

## 2022-07-29 DIAGNOSIS — E78.2 MIXED HYPERLIPIDEMIA: ICD-10-CM

## 2022-07-29 DIAGNOSIS — K58.2 IRRITABLE BOWEL SYNDROME WITH BOTH CONSTIPATION AND DIARRHEA: ICD-10-CM

## 2022-07-29 DIAGNOSIS — E55.9 VITAMIN D DEFICIENCY: ICD-10-CM

## 2022-07-29 LAB
25(OH)D3 SERPL-MCNC: 26.8 NG/ML (ref 30–100)
ALBUMIN SERPL BCP-MCNC: 3.9 G/DL (ref 3.5–5)
ALP SERPL-CCNC: 48 U/L (ref 46–116)
ALT SERPL W P-5'-P-CCNC: 34 U/L (ref 12–78)
ANION GAP SERPL CALCULATED.3IONS-SCNC: 2 MMOL/L (ref 4–13)
AST SERPL W P-5'-P-CCNC: 32 U/L (ref 5–45)
BILIRUB SERPL-MCNC: 0.56 MG/DL (ref 0.2–1)
BUN SERPL-MCNC: 8 MG/DL (ref 5–25)
CALCIUM SERPL-MCNC: 9.4 MG/DL (ref 8.3–10.1)
CHLORIDE SERPL-SCNC: 106 MMOL/L (ref 96–108)
CHOLEST SERPL-MCNC: 155 MG/DL
CO2 SERPL-SCNC: 30 MMOL/L (ref 21–32)
CREAT SERPL-MCNC: 0.67 MG/DL (ref 0.6–1.3)
GFR SERPL CREATININE-BSD FRML MDRD: 89 ML/MIN/1.73SQ M
GLUCOSE P FAST SERPL-MCNC: 91 MG/DL (ref 65–99)
HDLC SERPL-MCNC: 49 MG/DL
LDLC SERPL DIRECT ASSAY-MCNC: 89 MG/DL (ref 0–100)
POTASSIUM SERPL-SCNC: 4.5 MMOL/L (ref 3.5–5.3)
PROT SERPL-MCNC: 7.3 G/DL (ref 6.4–8.4)
SODIUM SERPL-SCNC: 138 MMOL/L (ref 135–147)

## 2022-07-29 PROCEDURE — 86364 TISS TRNSGLTMNASE EA IG CLAS: CPT

## 2022-07-29 PROCEDURE — 36415 COLL VENOUS BLD VENIPUNCTURE: CPT

## 2022-07-29 PROCEDURE — 82465 ASSAY BLD/SERUM CHOLESTEROL: CPT

## 2022-07-29 PROCEDURE — 83721 ASSAY OF BLOOD LIPOPROTEIN: CPT

## 2022-07-29 PROCEDURE — 80053 COMPREHEN METABOLIC PANEL: CPT

## 2022-07-29 PROCEDURE — 83718 ASSAY OF LIPOPROTEIN: CPT

## 2022-07-29 PROCEDURE — 86258 DGP ANTIBODY EACH IG CLASS: CPT

## 2022-07-29 PROCEDURE — 86231 EMA EACH IG CLASS: CPT

## 2022-07-29 PROCEDURE — 82784 ASSAY IGA/IGD/IGG/IGM EACH: CPT

## 2022-07-29 PROCEDURE — 82306 VITAMIN D 25 HYDROXY: CPT

## 2022-07-30 ENCOUNTER — APPOINTMENT (OUTPATIENT)
Dept: LAB | Facility: MEDICAL CENTER | Age: 67
End: 2022-07-30
Payer: MEDICARE

## 2022-07-30 DIAGNOSIS — K58.2 IRRITABLE BOWEL SYNDROME WITH BOTH CONSTIPATION AND DIARRHEA: ICD-10-CM

## 2022-07-30 LAB
ENDOMYSIUM IGA SER QL: NEGATIVE
GLIADIN PEPTIDE IGA SER-ACNC: 2 UNITS (ref 0–19)
GLIADIN PEPTIDE IGG SER-ACNC: 2 UNITS (ref 0–19)
IGA SERPL-MCNC: 122 MG/DL (ref 87–352)
TTG IGA SER-ACNC: <2 U/ML (ref 0–3)
TTG IGG SER-ACNC: <2 U/ML (ref 0–5)

## 2022-07-30 PROCEDURE — 87209 SMEAR COMPLEX STAIN: CPT

## 2022-07-30 PROCEDURE — 87329 GIARDIA AG IA: CPT

## 2022-07-30 PROCEDURE — 87177 OVA AND PARASITES SMEARS: CPT

## 2022-07-30 PROCEDURE — 83993 ASSAY FOR CALPROTECTIN FECAL: CPT

## 2022-08-01 LAB — G LAMBLIA AG STL QL IA: NEGATIVE

## 2022-08-02 LAB — CALPROTECTIN STL-MCNT: 1596 UG/G (ref 0–120)

## 2022-08-08 ENCOUNTER — TELEPHONE (OUTPATIENT)
Dept: OTHER | Facility: OTHER | Age: 67
End: 2022-08-08

## 2022-08-08 ENCOUNTER — PREP FOR PROCEDURE (OUTPATIENT)
Dept: GASTROENTEROLOGY | Facility: CLINIC | Age: 67
End: 2022-08-08

## 2022-08-08 DIAGNOSIS — R19.7 DIARRHEA, UNSPECIFIED TYPE: Primary | ICD-10-CM

## 2022-08-09 ENCOUNTER — HOSPITAL ENCOUNTER (OUTPATIENT)
Dept: MAMMOGRAPHY | Facility: MEDICAL CENTER | Age: 67
Discharge: HOME | End: 2022-08-09
Payer: MEDICARE

## 2022-08-09 VITALS — WEIGHT: 156 LBS | BODY MASS INDEX: 24.48 KG/M2 | HEIGHT: 67 IN

## 2022-08-09 DIAGNOSIS — Z12.31 ENCOUNTER FOR SCREENING MAMMOGRAM FOR MALIGNANT NEOPLASM OF BREAST: ICD-10-CM

## 2022-08-09 DIAGNOSIS — R19.7 DIARRHEA, UNSPECIFIED TYPE: Primary | ICD-10-CM

## 2022-08-09 PROCEDURE — 77063 BREAST TOMOSYNTHESIS BI: CPT

## 2022-08-09 PROCEDURE — 77067 SCR MAMMO BI INCL CAD: CPT

## 2022-08-09 NOTE — TELEPHONE ENCOUNTER
Scheduled date of colonoscopy (as of today):  8/24/22  Physician performing colonoscopy: Dr Pat Sarabia  [place] of colon[place]  Bowel prep reviewed with patient: golytely/dulcolax - prep instructions mailed to the patient  Instructions reviewed with patient by: Bridgette Tompkins  Clearances:  n/a

## 2022-08-09 NOTE — TELEPHONE ENCOUNTER
----- Message from Vamsi Campa DO sent at 8/8/2022  7:15 AM EDT -----  Regarding: FW: Colonoscopy  Please schedule patient for colonoscopy for evaluation of diarrhea  Thank you  ----- Message -----  From: King Longo MA  Sent: 8/8/2022   7:01 AM EDT  To: Vamsi Campa DO  Subject: FW: Colonoscopy                                    ----- Message -----  From: Rossi Soares  Sent: 8/7/2022   9:08 AM EDT  To: , #  Subject: Colonoscopy                                      Yes, I would like to find out exactly what is going on with me with a colonoscopy  I can do it any day except Thursday  I need a prep day and a day of the colonoscopy that is earlier in the week because we watch two young great grandchildren on Thursdays    My last colonoscopy was with Dr Leonides Pina

## 2022-08-12 DIAGNOSIS — K58.2 IRRITABLE BOWEL SYNDROME WITH BOTH CONSTIPATION AND DIARRHEA: ICD-10-CM

## 2022-08-12 RX ORDER — COLESEVELAM HYDROCHLORIDE 3.75 G/1
POWDER, FOR SUSPENSION ORAL
Qty: 30 EACH | Refills: 1 | Status: SHIPPED | OUTPATIENT
Start: 2022-08-12 | End: 2022-09-13

## 2022-08-22 ENCOUNTER — TELEPHONE (OUTPATIENT)
Dept: OTHER | Facility: OTHER | Age: 67
End: 2022-08-22

## 2022-08-22 NOTE — TELEPHONE ENCOUNTER
Patient wanted to make the office aware she had recent dental work done and was on ABT  She wanted to make sure that was okay with her upcoming colonoscopy  Patient reassured that the ABT should not affect her colonoscopy

## 2022-08-23 RX ORDER — SODIUM CHLORIDE 9 MG/ML
125 INJECTION, SOLUTION INTRAVENOUS CONTINUOUS
Status: CANCELLED | OUTPATIENT
Start: 2022-08-23

## 2022-08-24 ENCOUNTER — ANESTHESIA (OUTPATIENT)
Dept: GASTROENTEROLOGY | Facility: MEDICAL CENTER | Age: 67
End: 2022-08-24

## 2022-08-24 ENCOUNTER — HOSPITAL ENCOUNTER (OUTPATIENT)
Dept: GASTROENTEROLOGY | Facility: MEDICAL CENTER | Age: 67
Setting detail: OUTPATIENT SURGERY
Discharge: HOME | End: 2022-08-24
Payer: MEDICARE

## 2022-08-24 ENCOUNTER — ANESTHESIA EVENT (OUTPATIENT)
Dept: GASTROENTEROLOGY | Facility: MEDICAL CENTER | Age: 67
End: 2022-08-24

## 2022-08-24 VITALS
TEMPERATURE: 97 F | HEIGHT: 67 IN | BODY MASS INDEX: 23.23 KG/M2 | RESPIRATION RATE: 16 BRPM | OXYGEN SATURATION: 100 % | DIASTOLIC BLOOD PRESSURE: 56 MMHG | HEART RATE: 55 BPM | WEIGHT: 148 LBS | SYSTOLIC BLOOD PRESSURE: 107 MMHG

## 2022-08-24 DIAGNOSIS — R19.7 DIARRHEA, UNSPECIFIED TYPE: ICD-10-CM

## 2022-08-24 PROCEDURE — 45380 COLONOSCOPY AND BIOPSY: CPT | Performed by: INTERNAL MEDICINE

## 2022-08-24 PROCEDURE — 88305 TISSUE EXAM BY PATHOLOGIST: CPT | Performed by: PATHOLOGY

## 2022-08-24 PROCEDURE — 45385 COLONOSCOPY W/LESION REMOVAL: CPT | Performed by: INTERNAL MEDICINE

## 2022-08-24 RX ORDER — LIDOCAINE HYDROCHLORIDE 20 MG/ML
INJECTION, SOLUTION EPIDURAL; INFILTRATION; INTRACAUDAL; PERINEURAL AS NEEDED
Status: DISCONTINUED | OUTPATIENT
Start: 2022-08-24 | End: 2022-08-24

## 2022-08-24 RX ORDER — PROPOFOL 10 MG/ML
INJECTION, EMULSION INTRAVENOUS AS NEEDED
Status: DISCONTINUED | OUTPATIENT
Start: 2022-08-24 | End: 2022-08-24

## 2022-08-24 RX ORDER — SODIUM CHLORIDE 9 MG/ML
125 INJECTION, SOLUTION INTRAVENOUS CONTINUOUS
Status: DISCONTINUED | OUTPATIENT
Start: 2022-08-24 | End: 2022-08-28 | Stop reason: HOSPADM

## 2022-08-24 RX ORDER — PROPOFOL 10 MG/ML
INJECTION, EMULSION INTRAVENOUS CONTINUOUS PRN
Status: DISCONTINUED | OUTPATIENT
Start: 2022-08-24 | End: 2022-08-24

## 2022-08-24 RX ADMIN — LIDOCAINE HYDROCHLORIDE 80 MG: 20 INJECTION, SOLUTION EPIDURAL; INFILTRATION; INTRACAUDAL; PERINEURAL at 08:12

## 2022-08-24 RX ADMIN — PROPOFOL 50 MG: 10 INJECTION, EMULSION INTRAVENOUS at 08:13

## 2022-08-24 RX ADMIN — PROPOFOL 180 MCG/KG/MIN: 10 INJECTION, EMULSION INTRAVENOUS at 08:12

## 2022-08-24 RX ADMIN — Medication 40 MG: at 08:23

## 2022-08-24 RX ADMIN — SODIUM CHLORIDE 125 ML/HR: 0.9 INJECTION, SOLUTION INTRAVENOUS at 07:58

## 2022-08-24 RX ADMIN — PROPOFOL 100 MG: 10 INJECTION, EMULSION INTRAVENOUS at 08:12

## 2022-08-24 NOTE — ANESTHESIA PREPROCEDURE EVALUATION
Procedure:  COLONOSCOPY    Relevant Problems   CARDIO   (+) Carotid stenosis   (+) Heart valve regurgitation   (+) Hyperlipidemia      GI/HEPATIC   (+) Acid reflux      MUSCULOSKELETAL   (+) Primary localized osteoarthritis of left knee   (+) Primary osteoarthritis of right knee      Digestive   (+) Diverticulosis      Other   (+) Bipolar 1 disorder ([facility] [address] )   (+) COVID-19 virus infection (Resolved) (6/30/2022)      LEFT VENTRICLE:  Systolic function was normal  Ejection fraction was estimated to be 60 %  There were no regional wall motion abnormalities  Wall thickness was at the upper limits of normal      LEFT ATRIUM:  The atrium was mildly dilated      MITRAL VALVE:  There was mild annular calcification  There was mild regurgitation      AORTIC VALVE:  There was mild regurgitation      TRICUSPID VALVE:  There was mild regurgitation      PULMONIC VALVE:  There was trace regurgitation  Physical Exam    Airway  Comment: MICROGNATHIC  Mallampati score: III  TM Distance: <3 FB  Neck ROM: full     Dental       Cardiovascular  Rhythm: regular, Rate: normal,     Pulmonary  Breath sounds clear to auscultation,     Other Findings        Anesthesia Plan  ASA Score- 2     Anesthesia Type- IV sedation with anesthesia with ASA Monitors  Additional Monitors:   Airway Plan:           Plan Factors-Exercise tolerance (METS): >4 METS  Patient is not a current smoker  Induction- intravenous  Postoperative Plan-     Informed Consent- Anesthetic plan and risks discussed with patient

## 2022-08-24 NOTE — H&P
H&P EXAM - Outpatient Endoscopy   Penelope Madden 69 y o  female MRN: [id number]    [address] [facility]   Encounter: [id number]      History and Physical - [facility]  Penelope Madden 69 y o  female MRN: [id number]                  HPI: Penelope Madden is a 69y o  year old female who presents for diarrhea, elevated fecal calprotectin      REVIEW OF SYSTEMS: Per the HPI, and otherwise unremarkable  Historical Information   Past Medical History:   Diagnosis Date    Anxiety     Colon polyp     COVID-19 virus infection 2021    Symptoms began        Depression     Diverticulosis     Hyperlipidemia     Uncomplicated alcohol abuse      Past Surgical History:   Procedure Laterality Date    BREAST BIOPSY Right     benign    INCISIONAL BREAST BIOPSY       Social History   Social History     Substance and Sexual Activity   Alcohol Use Not Currently     Social History     Substance and Sexual Activity   Drug Use No     Social History     Tobacco Use   Smoking Status Former Smoker    Quit date: 2002    Years since quittin 5   Smokeless Tobacco Never Used     Family History   Problem Relation Age of Onset    Breast cancer Mother 72    Alcohol abuse Father     Coronary artery disease Father     No Known Problems Sister     No Known Problems Maternal Grandmother     No Known Problems Maternal Grandfather     No Known Problems Paternal Grandmother     No Known Problems Paternal Grandfather     Alcohol abuse Brother     Bipolar disorder Brother     Alcohol abuse Brother     No Known Problems Maternal Aunt     No Known Problems Maternal Aunt     Cancer Maternal Aunt     No Known Problems Paternal Aunt     No Known Problems Paternal Aunt        Meds/Allergies     (Not in a hospital admission)      Allergies   Allergen Reactions    Alcohol - Food Allergy Confusion and Delirium     In Recovery    Pravastatin Other (See Comments)     Felt ill  Singulair [Montelukast] Other (See Comments)     Ineffective      Zithromax [Azithromycin] Diarrhea and Rash       Objective     /70   Pulse 68   Temp (!) 97 °F (36 1 °C) (Temporal)   Resp 18   Ht 5' 7" (1 702 m)   Wt 67 1 kg (148 lb)   SpO2 100%   BMI 23 18 kg/m²       PHYSICAL EXAM    Gen: NAD  CV: RRR  CHEST: Clear  ABD: soft, NT/ND  EXT: no edema      ASSESSMENT/PLAN:  This is a 69y o  year old female here for colonoscopy, and she is stable and optimized for her procedure

## 2022-08-24 NOTE — ANESTHESIA POSTPROCEDURE EVALUATION
Post-Op Assessment Note    CV Status:  Stable    Pain management: adequate     Mental Status:  Awake   Hydration Status:  Stable   PONV Controlled:  None   Airway Patency:  Patent      Post Op Vitals Reviewed: Yes      Staff: Anesthesiologist         No complications documented      BP     Temp     Pulse     Resp      SpO2      BP (!) 87/48   Pulse 60   Temp (!) 97 °F (36 1 °C) (Temporal)   Resp 16   Ht 5' 7" (1 702 m)   Wt 67 1 kg (148 lb)   SpO2 99%   BMI 23 18 kg/m²

## 2022-08-30 PROCEDURE — 88305 TISSUE EXAM BY PATHOLOGIST: CPT | Performed by: PATHOLOGY

## 2022-08-31 ENCOUNTER — TELEPHONE (OUTPATIENT)
Dept: GASTROENTEROLOGY | Facility: CLINIC | Age: 67
End: 2022-08-31

## 2022-09-01 ENCOUNTER — TELEPHONE (OUTPATIENT)
Dept: GASTROENTEROLOGY | Facility: MEDICAL CENTER | Age: 67
End: 2022-09-01

## 2022-09-01 DIAGNOSIS — R19.5 ELEVATED FECAL CALPROTECTIN: ICD-10-CM

## 2022-09-01 DIAGNOSIS — K59.00 CONSTIPATION, UNSPECIFIED CONSTIPATION TYPE: Primary | ICD-10-CM

## 2022-09-01 DIAGNOSIS — R19.7 DIARRHEA, UNSPECIFIED TYPE: Primary | ICD-10-CM

## 2022-09-01 RX ORDER — POLYETHYLENE GLYCOL 3350 17 G/17G
17 POWDER, FOR SOLUTION ORAL DAILY
Qty: 255 G | Refills: 0 | Status: SHIPPED | OUTPATIENT
Start: 2022-09-01

## 2022-09-01 NOTE — TELEPHONE ENCOUNTER
I called the patient to review results of her recent biopsies  Colon biopsies show mildly increased intraepithelial lymphocytes throughout the colon with marked intraepithelial lymphocytes seen in the sigmoid colon ( the site of her erythema on the colonoscopy)  Given this and her elevated fecal calprotectin I discussed that this is likely related to microscopic colitis  She has been using nsaids frequently recently and I recommend she discontinue nsaid use given the association with microscopic colitis    She is not currently having diarrhea and endorses constipation now  I recommend she use miralax and uptitrate as needed    I discussed with her that she should call the GI office if she has recurrent diarrhea, and that treatment for microscopic colitis includes imodium or budesonide course pending the severity of her symptoms      I recommend repeating fecal calprotectin in 2 months to make sure it's improving or normalized    She verbalized understanding of the information and instructions provided  She is scheduled for office follow with Dr Low Gustafson on 9/16  She is due for repeat colonoscopy in 6 months for screening purposes given her inadequate prep on the last colonoscopy

## 2022-09-15 DIAGNOSIS — F31.9 BIPOLAR 1 DISORDER (HCC): ICD-10-CM

## 2022-09-16 ENCOUNTER — OFFICE VISIT (OUTPATIENT)
Dept: GASTROENTEROLOGY | Facility: CLINIC | Age: 67
End: 2022-09-16
Payer: MEDICARE

## 2022-09-16 VITALS
SYSTOLIC BLOOD PRESSURE: 108 MMHG | TEMPERATURE: 98.2 F | BODY MASS INDEX: 22.98 KG/M2 | HEIGHT: 67 IN | WEIGHT: 146.4 LBS | DIASTOLIC BLOOD PRESSURE: 70 MMHG

## 2022-09-16 DIAGNOSIS — K57.90 DIVERTICULOSIS: ICD-10-CM

## 2022-09-16 DIAGNOSIS — K52.832 LYMPHOCYTIC COLITIS: Primary | ICD-10-CM

## 2022-09-16 DIAGNOSIS — R19.5 ELEVATED FECAL CALPROTECTIN: ICD-10-CM

## 2022-09-16 DIAGNOSIS — R19.4 CHANGE IN BOWEL HABITS: ICD-10-CM

## 2022-09-16 PROCEDURE — 99213 OFFICE O/P EST LOW 20 MIN: CPT | Performed by: INTERNAL MEDICINE

## 2022-09-16 NOTE — PROGRESS NOTES
[facility] - Outpatient Follow-up Note  Tasneem Jain 69 y o  female MRN: [id number]  Encounter: [id number]          ASSESSMENT AND PLAN:    Tasneem Jain is a 69 y o  female with IBS-M, GERD, diverticulosis, depression on SSRI presenting for follow up of recently diagnosed lymphocytic colitis  1  Lymphocytic colitis    2  Change in bowel habits    3  Diverticulosis    4  Elevated fecal calprotectin    - Repeat fecal calprotectin in one month (2 months since sx resolution)  - Plan for repeat colonoscopy in 6 months to document sigmoid healing and for screening purposes  - No indication for additional medications at this time    Orders Placed This Encounter   Procedures    Colonoscopy     ______________________________________________________________________    SUBJECTIVE:    Tasneem Jain is a 69 y o  female with hx of IBS-M, GERD, diverticulosis, depression on SSRI presenting for follow up  Seen by Dr Sheryl moody in July of this year for persistent diarrhea  At that time, had celiac panel, fecal calprotectin, fecal giardia antigen ordered  Fecal calprotectin was elevated to 1596  Subsequently underwent colonoscopy 8/24/22 with Dr Elias Escobedo, which noted one polyp in descending colon, sigmoid diverticula, generalized edematous erythematous and granular mucosa in the sigmoid colon, small internal hemorrhoids, and normal appearance of mucosa otherwise  Random biopsies taken throughout colon  Path on descending colon polyp returned as hyperplastic  Random biopsies in cecum, ascending colon, transverse colon, sigmoid colon, and rectum all returned as bening colonic mucosa with variable levels of intraepithelial lymphocytes (moderately increased levels in sigmoid, mildly increased levels elsewhere)  In setting of elevated fecal calprotectin and symptoms, c/w microscopic colitis   Was called by Dr Elias Escobedo, recommended discontinuation of NSAIDs but as pt's diarrhea had resolved, no additional treatment indicated at that time  Today, symptoms are significantly improved  She has not taken NSAIDs since the discussion with Dr Ed Kramer, and her stools are no longer loose  She has one normal consistency BM every other day  No abdominal pain, no blood in BMs  Feels less fatigued as well  Does not a 20 lb weight loss in the past few months (on objective review, at least 10 lbs since )  Prep was noted to be inadequate for CRC screening on recent colonoscopy, especially in R colon  REVIEW OF SYSTEMS IS OTHERWISE NEGATIVE  Historical Information   Past Medical History:   Diagnosis Date    Anxiety     Colon polyp     COVID-19 virus infection 2021    Symptoms began        Depression     Diverticulosis     Hyperlipidemia     Uncomplicated alcohol abuse      Past Surgical History:   Procedure Laterality Date    BREAST BIOPSY Right     benign    COLONOSCOPY      INCISIONAL BREAST BIOPSY       Social History   Social History     Substance and Sexual Activity   Alcohol Use Not Currently     Social History     Substance and Sexual Activity   Drug Use No     Social History     Tobacco Use   Smoking Status Former Smoker    Quit date: 2002    Years since quittin 6   Smokeless Tobacco Never Used     Family History   Problem Relation Age of Onset    Breast cancer Mother 72    Alcohol abuse Father     Coronary artery disease Father     No Known Problems Sister     No Known Problems Maternal Grandmother     No Known Problems Maternal Grandfather     No Known Problems Paternal Grandmother     No Known Problems Paternal Grandfather     Alcohol abuse Brother     Bipolar disorder Brother     Alcohol abuse Brother     No Known Problems Maternal Aunt     No Known Problems Maternal Aunt     Cancer Maternal Aunt     No Known Problems Paternal Aunt     No Known Problems Paternal Aunt        Meds/Allergies       Current Outpatient Medications:    atorvastatin (LIPITOR) 10 mg tablet    Colesevelam HCl 3 75 g PACK    escitalopram (LEXAPRO) 10 mg tablet    estradiol (ESTRACE) 0 1 mg/g vaginal cream    Multiple Vitamins-Minerals (multivitamin with minerals) tablet    Sodium Chloride-Xylitol (Xlear Sinus Care Spray) SOLN    Zinc Sulfate (ZINC 15 PO)    bisacodyl (DULCOLAX) 5 mg EC tablet    cholecalciferol (VITAMIN D3) 1,000 units tablet    hyoscyamine (LEVSIN/SL) 0 125 mg SL tablet    ibuprofen (MOTRIN) 600 mg tablet    polyethylene glycol (GLYCOLAX) 17 GM/SCOOP powder    Allergies   Allergen Reactions    Alcohol - Food Allergy Confusion and Delirium     In Recovery    Pravastatin Other (See Comments)     Felt ill    Singulair [Montelukast] Other (See Comments)     Ineffective      Zithromax [Azithromycin] Diarrhea and Rash           Objective     Blood pressure 108/70, temperature 98 2 °F (36 8 °C), temperature source Tympanic, height 5' 7" (1 702 m), weight 66 4 kg (146 lb 6 4 oz), not currently breastfeeding  Body mass index is 22 93 kg/m²  Wt Readings from Last 3 Encounters:   09/16/22 66 4 kg (146 lb 6 4 oz)   08/24/22 67 1 kg (148 lb)   08/09/22 70 8 kg (156 lb)        PHYSICAL EXAM:      General Appearance:   Alert, cooperative, no distress   HEENT:   Normocephalic, atraumatic, anicteric  Neck:  Supple, symmetrical, trachea midline   Lungs:   Clear to auscultation bilaterally; no rales, rhonchi or wheezing; respirations unlabored    Heart[de-identified]   Regular rate and rhythm; no murmur, rub, or gallop     Abdomen:   Soft, non-tender, non-distended; normal bowel sounds; no masses, no organomegaly    Genitalia:   Deferred    Rectal:   Deferred    Extremities:  No cyanosis, clubbing or edema    Pulses:  2+ and symmetric    Skin:  No jaundice, rashes, or lesions    Lymph nodes:  No palpable cervical lymphadenopathy        Lab Results:       Lab Units 07/29/22  0942 03/18/22  1229 07/26/21  0957 01/22/21  0924   SODIUM mmol/L 138 141 137 143 POTASSIUM mmol/L 4 5 4 2 4 2 4 5   CHLORIDE mmol/L 106 108 105 111*   CO2 mmol/L 30 29 26 29   BUN mg/dL 8 11 12 11   CREATININE mg/dL 0 67 0 60 0 59* 0 56*   CALCIUM mg/dL 9 4 8 8 8 8 9 0            Lab Units 07/29/22  0942 03/18/22  1229 07/26/21  0957 01/22/21  0924   TOTAL PROTEIN g/dL 7 3 7 4 8 0 7 1   ALBUMIN g/dL 3 9 4 1 4 0 3 9   TOTAL BILIRUBIN mg/dL 0 56 0 79 0 77 0 53   AST U/L 32 29 21 23   ALT U/L 34 33 36 29   ALK PHOS U/L 48 45* 62 83       No results for input(s): WBC, HGB, HCT, PLT, MCV, MCH, MCHC, RDW, MPV, NRBC, NEUTOPHILPCT, IMMGRANS, LYMPHOPCT, MONOPCT, EOSPCT, BASOPCT, NEUTROABS, IMMGRANSABS, LYMPHSABS, MONOSABS, EOSABS, BASOSABS in the last 84338 hours  No results for input(s): IRON, TRANSFERRIN, TRANSFERSAT, FERRITIN, RETIC in the last 13329 hours  No results found for: CRP    Lab Results   Component Value Date    TFB2ORSMVAZU 2 390 03/16/2019       Radiology Results:   Colonoscopy    Addendum Date: 8/24/2022 Addendum:   [facility] [address] [place] [address] [phone] DATE OF SERVICE: 8/24/22 PHYSICIAN(S): Attending: Olinda Martinez MD Fellow: No Staff Documented INDICATION: Diarrhea, unspecified type POST-OP DIAGNOSIS: See the impression below  HISTORY: Prior colonoscopy: Less than 3 years ago  It is being repeated at an interval of less than 3 years because: This colonoscopy is being performed for a diagnostic indication BOWEL PREPARATION: Golytely/Colyte/Trilyte PREPROCEDURE: Informed consent was obtained for the procedure, including sedation  Risks including but not limited to bleeding, infection, perforation, adverse drug reaction and aspiration were explained in detail  Also explained about less than 100% sensitivity with the exam and other alternatives  The patient was placed in the left lateral decubitus position   DETAILS OF PROCEDURE: Patient was taken to the procedure room where a time out was performed to confirm correct patient and correct procedure  The patient underwent monitored anesthesia care, which was administered by an anesthesia professional  The patient's blood pressure, heart rate, level of consciousness, oxygen and respirations were monitored throughout the procedure  A digital rectal exam was performed  The scope was introduced through the anus and advanced to the terminal ileum  Retroflexion was performed in the rectum  The quality of bowel preparation was evaluated using the [facility] Bowel Preparation Scale with scores of: right colon = 1, transverse colon = 2, left colon = 2  The total BBPS score was 5  Bowel prep was not adequate  The patient experienced no blood loss  The procedure was not difficult  The patient tolerated the procedure well  There were no apparent complications  ANESTHESIA INFORMATION: ASA: II Anesthesia Type: IV Sedation with Anesthesia MEDICATIONS: simethicone (MYLICON) 40 mg in sterile water 60 mL 40 mg (Totals for administrations occurring from 0810 to 0840 on 08/24/22) FINDINGS: One sessile polyp measuring smaller than 5 mm in the descending colon; completely removed en bloc by cold snare and retrieved specimen Moderate, generalized edematous, erythematous and granular mucosa in the sigmoid colon; performed cold forceps biopsy Multiple small and large moderate diverticula causing mild luminal narrowing in the sigmoid colon The terminal ileum, cecum, ascending colon, hepatic flexure, transverse colon, splenic flexure, descending colon and rectum appeared normal  Performed random biopsy using biopsy forceps   Small, internal hemorrhoids EVENTS: Procedure Events Event Event Time ENDO CECUM REACHED 8/24/2022  8:23 AM ENDO SCOPE OUT TIME 8/24/2022  8:39 AM SPECIMENS: ID Type Source Tests Collected by Time Destination 1 : cecum bx for inflammatory bowel  Tissue Large Intestine, Cecum TISSUE EXAM Henrry Montaño MD 8/24/2022  8:26 AM  2 : bx ascending colon for inflammatory bowel Tissue Large Intestine, Right/Ascending Colon TISSUE EXAM Marleen Mireles MD 8/24/2022  8:26 AM  3 : bx transverse colon for inflammatory bowel Tissue Large Intestine, Transverse Colon TISSUE EXAM Marleen Mireles MD 8/24/2022  8:28 AM  4 : bx descending colon for inflammatory bowel Tissue Large Intestine, Left/Descending Colon TISSUE EXAM Marleen Mireles MD 8/24/2022  8:30 AM  5 : polyp descending colon cold snare Tissue Large Intestine, Left/Descending Colon TISSUE EXAM Marleen Mireles MD 8/24/2022  8:32 AM  6 : sigmoid bx for inflammation Tissue Large Intestine, Sigmoid Colon TISSUE EXAM Marleen Mireles MD 8/24/2022  8:35 AM  7 : bx rectum for inflammation Tissue Rectum TISSUE EXAM Marleen Mireles MD 8/24/2022  8:37 AM  EQUIPMENT: Colonoscope -[id number] Catglobe VISION MED BLUE ID [id number] IMPRESSION: Prep was inadequate in the right colon for screening purposes Mild-to-moderate generalized erythema with granularity in the sigmoid colon  Biopsied  The erythema was within and surrounding diverticulosis in the sigmoid colon  Differential includes inflammatory bowel disease versus SCAD The remainder of the terminal ileal and other colonic mucosa appeared normal   Biopsied  One subcentimeter colon polyp  Removed Moderate sigmoid diverticulosis Small internal hemorrhoids RECOMMENDATION: Await pathology results Repeat colonoscopy in 6 months due to an inadequate bowel preparation and to document healing of sigmoid colitis   Pending pathology results consider initiation antibiotics/mesalamine if consistent with SCAD vs oral/per rectum mesalamine if consistent with inflammatory bowel disease Follow up in GI office  Marleen Mireles MD     Result Date: 8/24/2022  Narrative: [facility] [address] [place] [address] [phone] DATE OF SERVICE: 8/24/22 PHYSICIAN(S): Attending: Marleen Mireles MD Fellow: No Staff Documented INDICATION: Diarrhea, unspecified type POST-OP DIAGNOSIS: See the impression below  HISTORY: Prior colonoscopy: Less than 3 years ago  It is being repeated at an interval of less than 3 years because: This colonoscopy is being performed for a diagnostic indication BOWEL PREPARATION: Golytely/Colyte/Trilyte PREPROCEDURE: Informed consent was obtained for the procedure, including sedation  Risks including but not limited to bleeding, infection, perforation, adverse drug reaction and aspiration were explained in detail  Also explained about less than 100% sensitivity with the exam and other alternatives  The patient was placed in the left lateral decubitus position  DETAILS OF PROCEDURE: Patient was taken to the procedure room where a time out was performed to confirm correct patient and correct procedure  The patient underwent monitored anesthesia care, which was administered by an anesthesia professional  The patient's blood pressure, heart rate, level of consciousness, oxygen and respirations were monitored throughout the procedure  A digital rectal exam was performed  The scope was introduced through the anus and advanced to the terminal ileum  Retroflexion was performed in the rectum  The quality of bowel preparation was evaluated using the [facility] Bowel Preparation Scale with scores of: right colon = 1, transverse colon = 2, left colon = 2  The total BBPS score was 5  Bowel prep was not adequate  The patient experienced no blood loss  The procedure was not difficult  The patient tolerated the procedure well  There were no apparent complications   ANESTHESIA INFORMATION: ASA: II Anesthesia Type: IV Sedation with Anesthesia MEDICATIONS: simethicone (MYLICON) 40 mg in sterile water 60 mL 40 mg (Totals for administrations occurring from 0810 to 0840 on 08/24/22) FINDINGS: One sessile polyp measuring smaller than 5 mm in the descending colon; completely removed en bloc by cold snare and retrieved specimen Moderate, generalized edematous, erythematous and granular mucosa in the sigmoid colon; performed cold forceps biopsy Multiple small and large moderate diverticula causing mild luminal narrowing in the sigmoid colon The terminal ileum, cecum, ascending colon, hepatic flexure, transverse colon, splenic flexure, descending colon and rectum appeared normal  Performed random biopsy using biopsy forceps  Small, internal hemorrhoids EVENTS: Procedure Events Event Event Time ENDO CECUM REACHED 8/24/2022  8:23 AM ENDO SCOPE OUT TIME 8/24/2022  8:39 AM SPECIMENS: ID Type Source Tests Collected by Time Destination 1 : cecum bx for inflammatory bowel  Tissue Large Intestine, Cecum TISSUE EXAM Etelvina Melendrez MD 8/24/2022  8:26 AM  2 : bx ascending colon for inflammatory bowel Tissue Large Intestine, Right/Ascending Colon TISSUE EXAM Etelvina Melendrez MD 8/24/2022  8:26 AM  3 : bx transverse colon for inflammatory bowel Tissue Large Intestine, Transverse Colon TISSUE EXAM Etelvina Melendrez MD 8/24/2022  8:28 AM  4 : bx descending colon for inflammatory bowel Tissue Large Intestine, Left/Descending Colon TISSUE EXAM Etelvina Melendrez MD 8/24/2022  8:30 AM  5 : polyp descending colon cold snare Tissue Large Intestine, Left/Descending Colon TISSUE EXAM Etelvina Melendrez MD 8/24/2022  8:32 AM  6 : sigmoid bx for inflammation Tissue Large Intestine, Sigmoid Colon TISSUE EXAM Etelvina Melendrez MD 8/24/2022  8:35 AM  7 : bx rectum for inflammation Tissue Rectum TISSUE EXAM Etelvina Melendrez MD 8/24/2022  8:37 AM  EQUIPMENT: Colonoscope -[id number] ENDOCUFF VISION MED BLUE ID [id number]     Impression: Prep was inadequate in the right colon for screening purposes Mild-to-moderate generalized erythema with granularity in the sigmoid colon  Biopsied  The erythema was within and surrounding diverticulosis in the sigmoid colon  Differential includes inflammatory bowel disease versus SCAD The remainder of the terminal ileal and other colonic mucosa appeared normal   Biopsied  One subcentimeter colon polyp  Removed Moderate sigmoid diverticulosis Small internal hemorrhoids RECOMMENDATION: Await pathology results Repeat colonoscopy in 6 months due to an inadequate bowel preparation Pending pathology results consider initiation her rectum mesalamine for treatment of scattered versus inflammatory bowel disease Follow up in GI office  MD María Whitmore MD  PGY-4 Gastroenterology Fellow  9/16/2022 12:41 PM

## 2022-09-20 RX ORDER — ESCITALOPRAM OXALATE 10 MG/1
TABLET ORAL
Qty: 90 TABLET | Refills: 1 | Status: SHIPPED | OUTPATIENT
Start: 2022-09-20

## 2022-11-16 ENCOUNTER — TELEPHONE (OUTPATIENT)
Dept: FAMILY MEDICINE CLINIC | Facility: CLINIC | Age: 67
End: 2022-11-16

## 2022-11-16 NOTE — TELEPHONE ENCOUNTER
Pt called in and scheduled an appt for 11/30/22 for her AWV, wanted to know if Dr Luis A Crow wanted lab work done, if so please call patient once order is placed

## 2022-12-21 ENCOUNTER — OFFICE VISIT (OUTPATIENT)
Dept: GASTROENTEROLOGY | Facility: CLINIC | Age: 67
End: 2022-12-21

## 2022-12-21 VITALS
DIASTOLIC BLOOD PRESSURE: 64 MMHG | SYSTOLIC BLOOD PRESSURE: 122 MMHG | BODY MASS INDEX: 22.29 KG/M2 | WEIGHT: 142 LBS | HEIGHT: 67 IN | TEMPERATURE: 98.4 F

## 2022-12-21 DIAGNOSIS — K59.00 CONSTIPATION, UNSPECIFIED CONSTIPATION TYPE: Primary | ICD-10-CM

## 2022-12-21 RX ORDER — POLYETHYLENE GLYCOL 3350 17 G/17G
17 POWDER, FOR SOLUTION ORAL DAILY
Qty: 100 EACH | Refills: 2 | Status: SHIPPED | OUTPATIENT
Start: 2022-12-21

## 2022-12-21 NOTE — PATIENT INSTRUCTIONS
[facility]: gut-brain connection and low FODMAP diet    Scheduled date of colonoscopy (as of today): 02/22/2023  Physician performing colonoscopy: Dr Tomy Covington   Location of colonoscopy: [facility]  Bowel prep reviewed with patient: 2 day prep   Instructions reviewed with patient by: Pattie Ayala   Clearances:  N/A

## 2022-12-21 NOTE — PROGRESS NOTES
[facility]ika Khoury [facility] - Outpatient Follow-up Note  Kaushik Bates 69 y o  female MRN: [id number]  Encounter: [id number]          ASSESSMENT AND PLAN:      Debora Rios is a 70 y/o female with IBS-M and lymphocytic colitis, GERD who presents for follow-up  1  Lymphocytic colitis  2  IBS-M  3  Anxiety and stress  Pt found to have elevated fecal calprotectin at 1596 and underwent colonoscopy 8/24/22, which noted one polyp in descending colon, sigmoid diverticulosis, generalized edematous erythematous and granular mucosa in the sigmoid colon, small internal hemorrhoids with random colon bx consistent with lymphocytic colitis  She was not having much diarrhea at her last OV, so she was not started on tx for this and asked to repeat her fecal calprotectin, which she did not do  Her colonoscopy was also fair prep, so she is due for repeat in early 2023  Today, pt notes that she is back to her "baseline IBS issues" in that some days she has formed BMs without issues, others she skips without a BM and others she has diarrhea  She says she believes this is triggered by stress as she has noticed an association and is still mainly dairy-free; she does admit to having more regular bowel habits when following low FODMAP diet so she would like to go back on this as well  She is avoiding NSAIDs   -I explained the gut-brain connection to the pt and let her know that stress/anxiety or any heightening of the nervous system is a very common trigger of IBS  -start miralax PRN constipation  -pt says her diarrhea is usually "a small amount" and not bothersome to she defers imodium  -repeat fecal coloproctectin and repeat colonoscopy in early 2023: 2-day prep  -pt says she is going to work on her anxiety/stress on her own but will reach out to us or her PCP is she needs help    4  GERD  Pt notes this is stable off of any meds at this time  ______________________________________________________________________    Alphonse Gonsalves is a 68 y/o female with IBS-M and lymphocytic colitis, GERD who presents for follow-up  Pt says that she has recently went back to her baseline "IBS bowel habits" in that she has days where she had formed BMs without issues, she has days when she is constipated and she has days where she has diarrhea  She says it is hard to quantify how often these are occurring but she does note that the diarrhea does not always follow days of constipation as it does occur even after having "normal" days  Pt denies any abdominal pain but dos have urgency with her diarrhea  She denies n/v, bloody or black BMs  unintentional weight loss, fevers, chills, night sweats  Pt says she has not tried taking anything for this as she does not like taking medications  REVIEW OF SYSTEMS IS OTHERWISE NEGATIVE  Historical Information   Past Medical History:   Diagnosis Date   • Anxiety    • Colon polyp    • COVID-19 virus infection 2021    Symptoms began       • Depression    • Diverticulosis    • Hyperlipidemia    • Uncomplicated alcohol abuse      Past Surgical History:   Procedure Laterality Date   • BREAST BIOPSY Right     benign   • COLONOSCOPY     • INCISIONAL BREAST BIOPSY       Social History   Social History     Substance and Sexual Activity   Alcohol Use Not Currently     Social History     Substance and Sexual Activity   Drug Use No     Social History     Tobacco Use   Smoking Status Former   • Types: Cigarettes   • Quit date: 2002   • Years since quittin 8   Smokeless Tobacco Never     Family History   Problem Relation Age of Onset   • Breast cancer Mother 72   • Alcohol abuse Father    • Coronary artery disease Father    • No Known Problems Sister    • No Known Problems Maternal Grandmother    • No Known Problems Maternal Grandfather    • No Known Problems Paternal Grandmother    • No Known Problems Paternal Grandfather    • Alcohol abuse Brother    • Bipolar disorder Brother    • Alcohol abuse Brother    • No Known Problems Maternal Aunt    • No Known Problems Maternal Aunt    • Cancer Maternal Aunt    • No Known Problems Paternal Aunt    • No Known Problems Paternal Aunt        Meds/Allergies       Current Outpatient Medications:   •  atorvastatin (LIPITOR) 10 mg tablet  •  bisacodyl (DULCOLAX) 5 mg EC tablet  •  cholecalciferol (VITAMIN D3) 1,000 units tablet  •  Colesevelam HCl 3 75 g PACK  •  escitalopram (LEXAPRO) 10 mg tablet  •  estradiol (ESTRACE) 0 1 mg/g vaginal cream  •  hyoscyamine (LEVSIN/SL) 0 125 mg SL tablet  •  ibuprofen (MOTRIN) 600 mg tablet  •  Multiple Vitamins-Minerals (multivitamin with minerals) tablet  •  polyethylene glycol (GLYCOLAX) 17 GM/SCOOP powder  •  Sodium Chloride-Xylitol (Xlear Sinus Care Spray) SOLN  •  Zinc Sulfate (ZINC 15 PO)    Allergies   Allergen Reactions   • Alcohol - Food Allergy Confusion and Delirium     In Recovery   • Pravastatin Other (See Comments)     Felt ill   • Singulair [Montelukast] Other (See Comments)     Ineffective     • Zithromax [Azithromycin] Diarrhea and Rash           Objective     not currently breastfeeding  There is no height or weight on file to calculate BMI  PHYSICAL EXAM:      General Appearance:   Alert, cooperative, no distress   HEENT:   Normocephalic, atraumatic, anicteric      Neck:  Supple, symmetrical, trachea midline   Lungs:   Clear to auscultation bilaterally; no rales, rhonchi or wheezing; respirations unlabored    Heart[de-identified]   Regular rate and rhythm; no murmur, rub, or gallop     Abdomen:   Soft, non-tender, non-distended; normal bowel sounds; no masses, no organomegaly    Genitalia:   Deferred    Rectal:   Deferred    Extremities:  No cyanosis, clubbing or edema    Pulses:  2+ and symmetric    Skin:  No jaundice, rashes, or lesions    Lymph nodes:  No palpable cervical lymphadenopathy        Lab Results:   No visits with results within 1 Day(s) from this visit  Latest known visit with results is:   Hospital Outpatient Visit on 08/24/2022   Component Date Value   • Case Report 08/24/2022                      Value:Surgical Pathology Report                         Case: [id number]                                   Authorizing Provider:  Ashley Rubio MD       Collected:           08/24/2022 0826              Ordering Location:     [address] [place] [address]        Received:            08/24/2022 [address] Endoscopy                                                     Pathologist:           Gladys Longo DO                                                     Specimens:   A) - Large Intestine, Cecum, cecum bx for inflammatory bowel                                        B) - Large Intestine, Right/Ascending Colon, bx ascending colon for inflammatory                    bowel                                                                                               C) - Large Intestine, Transverse Colon, bx transverse colon for inflammatory bowel                  D) - Large Intestine, Left/Descending Colon, bx descending colon for inflammatory                                             bowel                                                                                               E) - Large Intestine, Left/Descending Colon, polyp descending colon cold snare                      F) - Large Intestine, Sigmoid Colon, sigmoid bx for inflammation                                    G) - Rectum, bx rectum for inflammation                                                   • Final Diagnosis 08/24/2022                      Value: This result contains rich text formatting which cannot be displayed here  • Note 08/24/2022                      Value: This result contains rich text formatting which cannot be displayed here  • Additional Information 08/24/2022                      Value: This result contains rich text formatting which cannot be displayed here  • Synoptic Checklist 08/24/2022                      Value:                            COLON/RECTUM POLYP FORM - GI - All Specimens                                                                                     :    Other     • Gross Description 08/24/2022                      Value: This result contains rich text formatting which cannot be displayed here  Radiology Results:   No results found

## 2023-01-26 ENCOUNTER — TELEPHONE (OUTPATIENT)
Dept: FAMILY MEDICINE CLINIC | Facility: CLINIC | Age: 68
End: 2023-01-26

## 2023-02-09 ENCOUNTER — TELEPHONE (OUTPATIENT)
Dept: GASTROENTEROLOGY | Facility: MEDICAL CENTER | Age: 68
End: 2023-02-09

## 2023-02-09 DIAGNOSIS — R19.7 DIARRHEA, UNSPECIFIED TYPE: Primary | ICD-10-CM

## 2023-02-13 ENCOUNTER — LAB (OUTPATIENT)
Dept: LAB | Facility: MEDICAL CENTER | Age: 68
End: 2023-02-13

## 2023-02-13 DIAGNOSIS — R19.5 ELEVATED FECAL CALPROTECTIN: ICD-10-CM

## 2023-02-13 DIAGNOSIS — R19.7 DIARRHEA, UNSPECIFIED TYPE: ICD-10-CM

## 2023-02-15 ENCOUNTER — TELEPHONE (OUTPATIENT)
Dept: GASTROENTEROLOGY | Facility: CLINIC | Age: 68
End: 2023-02-15

## 2023-02-15 NOTE — TELEPHONE ENCOUNTER
Patient had some diet and prep questions for her colonoscopy prep  Questions answered and instructions reviewed  Patient has no other questions at this time

## 2023-02-16 LAB — CALPROTECTIN STL-MCNT: <16 UG/G (ref 0–120)

## 2023-02-20 RX ORDER — SODIUM CHLORIDE 9 MG/ML
125 INJECTION, SOLUTION INTRAVENOUS CONTINUOUS
Status: CANCELLED | OUTPATIENT
Start: 2023-02-20

## 2023-02-20 NOTE — TELEPHONE ENCOUNTER
Per patient's phone call, she is just now getting over having diarrhea, she feels like the bowel prep instructions is too much as she already cleaned herself out  She feels that the current bowel prep she has is too much

## 2023-02-20 NOTE — TELEPHONE ENCOUNTER
Spoke with pt  She was advised to do a 2 day prep for her procedure  This past week she had diarrhea from a suspected virus and is now resolved  She expressed hesitancy with doing the 2 day bowel prep and would like to just proceed with the one day prep  Recommended pt to proceed as scheduled with Golytely bowel prep instructions reviewed   Pt agreeable and verbalized understanding of the same

## 2023-02-21 ENCOUNTER — NURSE TRIAGE (OUTPATIENT)
Dept: OTHER | Facility: OTHER | Age: 68
End: 2023-02-21

## 2023-02-21 NOTE — TELEPHONE ENCOUNTER
answered patient's questions regarding Golytely bowel prep for colonoscopy scheduled for Wednesday 2/22  Triage RN called back to confirm patient had no further questions  Confirmed patient should complete second half of prep four hours prior to procedure scheduled for arrival at 9:15 AM     Reason for Disposition  • Bowel prep for colonoscopy, questions about    Answer Assessment - Initial Assessment Questions  1  DATE/TIME: "When did you have your colonoscopy?"       Scheduled for 2/23/23  2  MAIN CONCERN: "What is your main concern right now?" "What questions do you have?"      Questions answered by scheduling RN when call received to inform patient arrival time for procedure      Protocols used: COLONOSCOPY SYMPTOMS AND QUESTIONS-ADULT-

## 2023-02-21 NOTE — TELEPHONE ENCOUNTER
Regarding: Colonoscopy pre instructions  ----- Message from Onur Jauregui sent at 2/21/2023 12:55 PM EST -----  " I am calling because I have my colonoscopy tomorrow and I still have not received a call for instructions I have some questions "

## 2023-02-22 ENCOUNTER — ANESTHESIA EVENT (OUTPATIENT)
Dept: GASTROENTEROLOGY | Facility: MEDICAL CENTER | Age: 68
End: 2023-02-22

## 2023-02-22 ENCOUNTER — ANESTHESIA (OUTPATIENT)
Dept: GASTROENTEROLOGY | Facility: MEDICAL CENTER | Age: 68
End: 2023-02-22

## 2023-02-22 ENCOUNTER — HOSPITAL ENCOUNTER (OUTPATIENT)
Dept: GASTROENTEROLOGY | Facility: MEDICAL CENTER | Age: 68
Setting detail: OUTPATIENT SURGERY
Discharge: HOME | End: 2023-02-22

## 2023-02-22 VITALS
BODY MASS INDEX: 20.72 KG/M2 | HEART RATE: 58 BPM | HEIGHT: 67 IN | RESPIRATION RATE: 20 BRPM | SYSTOLIC BLOOD PRESSURE: 106 MMHG | OXYGEN SATURATION: 100 % | TEMPERATURE: 97.3 F | WEIGHT: 132 LBS | DIASTOLIC BLOOD PRESSURE: 57 MMHG

## 2023-02-22 DIAGNOSIS — K52.832 LYMPHOCYTIC COLITIS: ICD-10-CM

## 2023-02-22 RX ORDER — SODIUM CHLORIDE 9 MG/ML
125 INJECTION, SOLUTION INTRAVENOUS CONTINUOUS
Status: DISCONTINUED | OUTPATIENT
Start: 2023-02-22 | End: 2023-02-26 | Stop reason: HOSPADM

## 2023-02-22 RX ORDER — LIDOCAINE HYDROCHLORIDE 20 MG/ML
INJECTION, SOLUTION EPIDURAL; INFILTRATION; INTRACAUDAL; PERINEURAL AS NEEDED
Status: DISCONTINUED | OUTPATIENT
Start: 2023-02-22 | End: 2023-02-22

## 2023-02-22 RX ORDER — PROPOFOL 10 MG/ML
INJECTION, EMULSION INTRAVENOUS AS NEEDED
Status: DISCONTINUED | OUTPATIENT
Start: 2023-02-22 | End: 2023-02-22

## 2023-02-22 RX ADMIN — PROPOFOL 50 MG: 10 INJECTION, EMULSION INTRAVENOUS at 10:19

## 2023-02-22 RX ADMIN — PROPOFOL 50 MG: 10 INJECTION, EMULSION INTRAVENOUS at 10:24

## 2023-02-22 RX ADMIN — LIDOCAINE HYDROCHLORIDE 50 MG: 20 INJECTION, SOLUTION EPIDURAL; INFILTRATION; INTRACAUDAL at 10:18

## 2023-02-22 RX ADMIN — PROPOFOL 50 MG: 10 INJECTION, EMULSION INTRAVENOUS at 10:33

## 2023-02-22 RX ADMIN — PROPOFOL 50 MG: 10 INJECTION, EMULSION INTRAVENOUS at 10:27

## 2023-02-22 RX ADMIN — Medication 40 MG: at 10:29

## 2023-02-22 RX ADMIN — PROPOFOL 50 MG: 10 INJECTION, EMULSION INTRAVENOUS at 10:21

## 2023-02-22 RX ADMIN — PROPOFOL 50 MG: 10 INJECTION, EMULSION INTRAVENOUS at 10:18

## 2023-02-22 RX ADMIN — SODIUM CHLORIDE 125 ML/HR: 0.9 INJECTION, SOLUTION INTRAVENOUS at 10:14

## 2023-02-22 NOTE — ANESTHESIA PREPROCEDURE EVALUATION
Procedure:  COLONOSCOPY    Relevant Problems   CARDIO   (+) Atypical chest pain   (+) Hyperlipidemia      GI/HEPATIC   (+) Acid reflux      MUSCULOSKELETAL   (+) Primary localized osteoarthritis of left knee   (+) Primary osteoarthritis of right knee        Physical Exam    Airway    Mallampati score: II  TM Distance: >3 FB  Neck ROM: full     Dental       Cardiovascular  Cardiovascular exam normal    Pulmonary  Pulmonary exam normal     Other Findings        Anesthesia Plan  ASA Score- 2     Anesthesia Type-     Plan Factors-    Chart reviewed  Imaging results reviewed  Existing labs reviewed  Patient summary reviewed  Induction- intravenous  Postoperative Plan-     Informed Consent- Anesthetic plan and risks discussed with patient

## 2023-02-22 NOTE — ANESTHESIA POSTPROCEDURE EVALUATION
Post-Op Assessment Note    CV Status:  Stable    Pain management: adequate     Mental Status:  Alert and awake   Hydration Status:  Euvolemic   PONV Controlled:  Controlled   Airway Patency:  Patent      Post Op Vitals Reviewed: Yes      Staff: Anesthesiologist         No notable events documented    BP 99/51   Pulse 60   Temp (!) 97 3 °F (36 3 °C) (Temporal)   Resp 20   Ht 5' 7" (1 702 m)   Wt 59 9 kg (132 lb)   SpO2 100%   BMI 20 67 kg/m²   BP      Temp     Pulse     Resp      SpO2

## 2023-02-22 NOTE — H&P
H&P EXAM - Outpatient Endoscopy   Severiano Hart 69 y o  female MRN: [id number]    [facility]y [facility] ENDOSCOPY   Encounter: [id number]        History and Physical - [facility]  Severiano Hart 69 y o  female MRN: [id number]                  HPI: Severiano Hart is a 69y o  year old female who presents for history of lymphocytic colitis, inadequate bowel prep on prior colonoscopy      REVIEW OF SYSTEMS: Per the HPI, and otherwise unremarkable  Historical Information   Past Medical History:   Diagnosis Date   • Anxiety    • Colon polyp    • COVID-19 virus infection 2021    Symptoms began       • Depression    • Diverticulosis    • Hyperlipidemia    • Uncomplicated alcohol abuse      Past Surgical History:   Procedure Laterality Date   • BREAST BIOPSY Right     benign   • COLONOSCOPY     • INCISIONAL BREAST BIOPSY       Social History   Social History     Substance and Sexual Activity   Alcohol Use Not Currently     Social History     Substance and Sexual Activity   Drug Use No     Social History     Tobacco Use   Smoking Status Former   • Types: Cigarettes   • Quit date: 2002   • Years since quittin 0   Smokeless Tobacco Never     Family History   Problem Relation Age of Onset   • Breast cancer Mother 72   • Alcohol abuse Father    • Coronary artery disease Father    • No Known Problems Sister    • No Known Problems Maternal Grandmother    • No Known Problems Maternal Grandfather    • No Known Problems Paternal Grandmother    • No Known Problems Paternal Grandfather    • Alcohol abuse Brother    • Bipolar disorder Brother    • Alcohol abuse Brother    • No Known Problems Maternal Aunt    • No Known Problems Maternal Aunt    • Cancer Maternal Aunt    • No Known Problems Paternal Aunt    • No Known Problems Paternal Aunt        Meds/Allergies     (Not in a hospital admission)      Allergies   Allergen Reactions   • Alcohol - Food Allergy Confusion and Delirium     In Recovery   • Pravastatin Other (See Comments)     Felt ill   • Singulair [Montelukast] Other (See Comments)     Ineffective     • Zithromax [Azithromycin] Diarrhea and Rash       Objective     There were no vitals taken for this visit  PHYSICAL EXAM    Gen: NAD  CV: RRR  CHEST: Clear  ABD: soft, NT/ND  EXT: no edema      ASSESSMENT/PLAN:  This is a 69y o  year old female here for colonoscopy, and she is stable and optimized for her procedure

## 2023-02-27 NOTE — RESULT ENCOUNTER NOTE
Results relayed via Mychart    Persistent lymphocytic colitis however patient is asymptomatic and is now avoiding NSAIDs    She does not require treatment  She scheduled for office follow-up in March

## 2023-03-06 NOTE — RESULT ENCOUNTER NOTE
Please call the patient with the results    I sent the result via [employer] but received a notification that it was not viewed      The biopsies from your colonoscopy still showed microscopic inflammation as it did in the past  Alea Rodriguez that you do not have symptoms (diarrhea) you do not require treatment as we discussed after the colonoscopy   If you begin having diarrhea symptoms again please contact our office to discuss treatment   You are scheduled for office follow-up in March     You are due for next colonoscopy in 5 years due to history of colon polyps

## 2023-03-07 ENCOUNTER — APPOINTMENT (OUTPATIENT)
Dept: LAB | Facility: CLINIC | Age: 68
End: 2023-03-07

## 2023-03-07 DIAGNOSIS — E55.9 VITAMIN D DEFICIENCY: ICD-10-CM

## 2023-03-07 DIAGNOSIS — E78.2 MIXED HYPERLIPIDEMIA: ICD-10-CM

## 2023-03-07 LAB
25(OH)D3 SERPL-MCNC: 31.6 NG/ML (ref 30–100)
ALBUMIN SERPL BCP-MCNC: 4 G/DL (ref 3.5–5)
ALP SERPL-CCNC: 55 U/L (ref 46–116)
ALT SERPL W P-5'-P-CCNC: 27 U/L (ref 12–78)
ANION GAP SERPL CALCULATED.3IONS-SCNC: 0 MMOL/L (ref 4–13)
AST SERPL W P-5'-P-CCNC: 25 U/L (ref 5–45)
BILIRUB SERPL-MCNC: 0.72 MG/DL (ref 0.2–1)
BUN SERPL-MCNC: 12 MG/DL (ref 5–25)
CALCIUM SERPL-MCNC: 9.2 MG/DL (ref 8.3–10.1)
CHLORIDE SERPL-SCNC: 107 MMOL/L (ref 96–108)
CHOLEST SERPL-MCNC: 206 MG/DL
CO2 SERPL-SCNC: 29 MMOL/L (ref 21–32)
CREAT SERPL-MCNC: 0.57 MG/DL (ref 0.6–1.3)
GFR SERPL CREATININE-BSD FRML MDRD: 94 ML/MIN/1.73SQ M
GLUCOSE P FAST SERPL-MCNC: 90 MG/DL (ref 65–99)
HDLC SERPL-MCNC: 54 MG/DL
LDLC SERPL CALC-MCNC: 130 MG/DL (ref 0–100)
NONHDLC SERPL-MCNC: 152 MG/DL
POTASSIUM SERPL-SCNC: 4.1 MMOL/L (ref 3.5–5.3)
PROT SERPL-MCNC: 7.2 G/DL (ref 6.4–8.4)
SODIUM SERPL-SCNC: 136 MMOL/L (ref 135–147)
TRIGL SERPL-MCNC: 110 MG/DL

## 2023-03-13 ENCOUNTER — OFFICE VISIT (OUTPATIENT)
Dept: GASTROENTEROLOGY | Facility: CLINIC | Age: 68
End: 2023-03-13

## 2023-03-13 VITALS
DIASTOLIC BLOOD PRESSURE: 74 MMHG | TEMPERATURE: 97.4 F | BODY MASS INDEX: 22.57 KG/M2 | HEIGHT: 67 IN | SYSTOLIC BLOOD PRESSURE: 122 MMHG | WEIGHT: 143.8 LBS

## 2023-03-13 DIAGNOSIS — E44.1 MILD PROTEIN-CALORIE MALNUTRITION (HCC): Primary | ICD-10-CM

## 2023-03-13 DIAGNOSIS — K58.2 IRRITABLE BOWEL SYNDROME WITH BOTH CONSTIPATION AND DIARRHEA: ICD-10-CM

## 2023-03-13 DIAGNOSIS — K52.832 LYMPHOCYTIC COLITIS: ICD-10-CM

## 2023-03-13 DIAGNOSIS — K63.5 POLYP OF COLON, UNSPECIFIED PART OF COLON, UNSPECIFIED TYPE: ICD-10-CM

## 2023-03-13 NOTE — PROGRESS NOTES
[facility] - Outpatient Consultation  Jen Gonzalez 69 y o  female MRN: [id number]  Encounter: [id number]          ASSESSMENT AND PLAN:      1  Irritable bowel syndrome with both constipation and diarrhea  - hyoscyamine (LEVSIN/SL) 0 125 mg SL tablet; Take 1 tablet (0 125 mg total) by mouth every 4 (four) hours as needed for cramping  Dispense: 30 tablet; Refill: 5  2  Mild protein-calorie malnutrition (HCC)  3  Lymphocytic colitis  4  Polyp of colon, unspecified part of colon, unspecified type    75-year-old female with IBS and lymphocytic colitis presenting for follow-up after recent colonoscopy  Currently her symptoms are stable without significant diarrhea to suggest flare of lymphocytic colitis or need for treatment  Suspect that her current symptoms are primarily secondary to IBS with intermittent abdominal pain and change in bowel habits given lack of watery diarrhea and being more prone to constipation  She will continue MiraLAX or magnesium supplement as needed for constipation  A refill for Levsin was provided for as needed abdominal pain  For history of colon polyps she will need repeat colonoscopy in 5 years  Follow-up in 6 months or sooner if needed  ______________________________________________________________________    HPI:  75-year-old female with IBS and lymphocytic colitis presenting for follow-up after recent colonoscopy  Lymphocytic colitis was presumed to be combination from NSAIDs and Lexapro  When she had a repeat colonoscopy in February 2023 biopsies did reveal persistent of lymphocytic infiltration however due to no significant diarrhea treatment at that time was not indicated  She currently does not have any significant diarrhea and actually will occasionally require treatment for constipation and is using MiraLAX  She also continues to have mild abdominal pain for which she was previously given Levsin which did help      Summary of presenting illness :when she was first seen in July 2022 she had a colonoscopy due to severe elevation in fecal calprotectin and symptoms of diarrhea  She was found to have a fair bowel prep along with diverticulosis and localized erythema near the diverticular disease  Biopsies were consistent with lymphocytic colitis with particularly elevated intraepithelial lymphocytes in the area of sigmoid erythema  This is thought to be multifactorial in the setting of NSAID use for which she is no longer taking  She has also eliminated dairy as well as other FODMAP so and has had almost complete resolution of diarrhea episodes  She does intermittently get constipated  With dietary changes in eating healthy in general she continues to have intentional weight loss  Overall she feels well and does not feel the need to start any medications at this time  REVIEW OF SYSTEMS:    CONSTITUTIONAL: Denies any fever, chills, rigors, and weight loss  HEENT: Denies odynophagia, tinnitus  CARDIOVASCULAR: No chest pain or palpitations  RESPIRATORY: Denies any cough, hemoptysis, shortness of breath or dyspnea on exertion  GASTROINTESTINAL: As noted in the History of Present Illness  GENITOURINARY: No problems with urination  Denies any hematuria or dysuria  NEUROLOGIC: No dizziness or vertigo, denies headaches  MUSCULOSKELETAL: Denies any muscle or joint pain  SKIN: Denies skin rashes or itching  ENDOCRINE:  Denies intolerance to heat or cold  PSYCHOSOCIAL: Denies depression or anxiety  Denies any recent memory loss  Historical Information   Past Medical History:   Diagnosis Date   • Anxiety    • Colon polyp    • COVID-19 virus infection 12/08/2021    Symptoms began 12/29      • Depression    • Diverticulosis    • Hyperlipidemia    • Uncomplicated alcohol abuse      Past Surgical History:   Procedure Laterality Date   • BREAST BIOPSY Right     benign   • COLONOSCOPY     • INCISIONAL BREAST BIOPSY       Social History   Social History     Substance and Sexual Activity   Alcohol Use Not Currently     Social History     Substance and Sexual Activity   Drug Use No     Social History     Tobacco Use   Smoking Status Former   • Types: Cigarettes   • Quit date: 2002   • Years since quittin 1   Smokeless Tobacco Never     Family History   Problem Relation Age of Onset   • Breast cancer Mother 72   • Alcohol abuse Father    • Coronary artery disease Father    • No Known Problems Sister    • No Known Problems Maternal Grandmother    • No Known Problems Maternal Grandfather    • No Known Problems Paternal Grandmother    • No Known Problems Paternal Grandfather    • Alcohol abuse Brother    • Bipolar disorder Brother    • Alcohol abuse Brother    • No Known Problems Maternal Aunt    • No Known Problems Maternal Aunt    • Cancer Maternal Aunt    • No Known Problems Paternal Aunt    • No Known Problems Paternal Aunt        Meds/Allergies       Current Outpatient Medications:   •  atorvastatin (LIPITOR) 10 mg tablet  •  escitalopram (LEXAPRO) 10 mg tablet  •  estradiol (ESTRACE) 0 1 mg/g vaginal cream  •  hyoscyamine (LEVSIN/SL) 0 125 mg SL tablet  •  Multiple Vitamins-Minerals (multivitamin with minerals) tablet  •  polyethylene glycol (MIRALAX) 17 g packet  •  Sodium Chloride-Xylitol (Xlear Sinus Care Spray) SOLN  •  Zinc Sulfate (ZINC 15 PO)  •  cholecalciferol (VITAMIN D3) 1,000 units tablet  •  Colesevelam HCl 3 75 g PACK  •  ibuprofen (MOTRIN) 600 mg tablet    Allergies   Allergen Reactions   • Alcohol - Food Allergy Confusion and Delirium     In Recovery   • Pravastatin Other (See Comments)     Felt ill   • Singulair [Montelukast] Other (See Comments)     Ineffective     • Zithromax [Azithromycin] Diarrhea and Rash           Objective     Blood pressure 122/74, temperature (!) 97 4 °F (36 3 °C), temperature source Tympanic, height 5' 7" (1 702 m), weight 65 2 kg (143 lb 12 8 oz), not currently breastfeeding   Body mass index is 22 52 kg/m²  PHYSICAL EXAM:      General Appearance:   Alert, cooperative, no distress   HEENT:   Normocephalic, atraumatic, anicteric  Neck:  Supple, symmetrical, trachea midline   Lungs:   Clear to auscultation bilaterally; no rales, rhonchi or wheezing; respirations unlabored    Heart[de-identified]   Regular rate and rhythm; no murmur, rub, or gallop  Abdomen:   Soft, non-tender, non-distended; normal bowel sounds; no masses, no organomegaly    Genitalia:   Deferred    Rectal:   Deferred    Extremities:  No cyanosis, clubbing or edema    Pulses:  2+ and symmetric    Skin:  No jaundice, rashes, or lesions          Lab Results:   No visits with results within 1 Day(s) from this visit  Latest known visit with results is:   Appointment on 03/07/2023   Component Date Value   • Sodium 03/07/2023 136    • Potassium 03/07/2023 4 1    • Chloride 03/07/2023 107    • CO2 03/07/2023 29    • ANION GAP 03/07/2023 0 (L)    • BUN 03/07/2023 12    • Creatinine 03/07/2023 0 57 (L)    • Glucose, Fasting 03/07/2023 90    • Calcium 03/07/2023 9 2    • AST 03/07/2023 25    • ALT 03/07/2023 27    • Alkaline Phosphatase 03/07/2023 55    • Total Protein 03/07/2023 7 2    • Albumin 03/07/2023 4 0    • Total Bilirubin 03/07/2023 0 72    • eGFR 03/07/2023 94    • Cholesterol 03/07/2023 206 (H)    • Triglycerides 03/07/2023 110    • HDL, Direct 03/07/2023 54    • LDL Calculated 03/07/2023 130 (H)    • Non-HDL-Chol (CHOL-HDL) 03/07/2023 152    • Vit D, 25-Hydroxy 03/07/2023 31 6          Radiology Results:   Colonoscopy    Result Date: 2/22/2023  Narrative: Table formatting from the original result was not included  [address] [facility] [address] [place] [address] [phone] DATE OF SERVICE: 2/22/23 PHYSICIAN(S): Attending: Rd Huang MD Fellow: No Staff Documented INDICATION: Lymphocytic colitis POST-OP DIAGNOSIS: See the impression below  HISTORY: Prior colonoscopy: Less than 3 years ago  It is being repeated at an interval of less than 3 years because: Last colonoscopy had inadequate bowel prep BOWEL PREPARATION: Golytely/Colyte/Trilyte PREPROCEDURE: Informed consent was obtained for the procedure, including sedation  Risks including but not limited to bleeding, infection, perforation, adverse drug reaction and aspiration were explained in detail  Also explained about less than 100% sensitivity with the exam and other alternatives  The patient was placed in the left lateral decubitus position  Procedure: Colonoscopy DETAILS OF PROCEDURE: Patient was taken to the procedure room where a time out was performed to confirm correct patient and correct procedure  The patient underwent monitored anesthesia care, which was administered by an anesthesia professional  The patient's blood pressure, heart rate, level of consciousness, oxygen and respirations were monitored throughout the procedure  A digital rectal exam was performed  The scope was introduced through the anus and advanced to the cecum  Retroflexion was performed in the rectum  The quality of bowel preparation was evaluated using the [facility] Bowel Preparation Scale with scores of: right colon = 2, transverse colon = 2, left colon = 3  The total BBPS score was 7  Bowel prep was adequate  The patient experienced no blood loss  The procedure was not difficult  The patient tolerated the procedure well  There were no apparent complications  ANESTHESIA INFORMATION: ASA: II Anesthesia Type: Anesthesia type not filed in the log   MEDICATIONS: sodium chloride 0 9 % infusion 400 mL*  *From user-documented volume simethicone (MYLICON) 40 mg in sterile water 60 mL 40 mg (Totals for administrations occurring from 1017 to 1039 on 02/22/23) FINDINGS: Multiple small and large, moderate extensive diverticula in the transverse colon, descending colon and sigmoid colon Mild, localized erythematous mucosa in the sigmoid colon; performed cold forceps biopsy Small, internal hemorrhoids Otherwise normal colonic mucosa EVENTS: Procedure Events Event Event Time ENDO CECUM REACHED 2/22/2023 10:26 AM ENDO SCOPE OUT TIME 2/22/2023 10:39 AM SPECIMENS: ID Type Source Tests Collected by Time Destination 1 : Random colon bx-cold Tissue Colon TISSUE EXAM Salvatore Hall MD 2/22/2023 10:28 AM  2 : sigmid colon inflammation bx-cold Tissue Large Intestine, Sigmoid Colon TISSUE EXAM Salvatore Hall MD 2/22/2023 10:36 AM  EQUIPMENT: Colonoscope -[id number] ENDOCUFF VISION MED BLUE ID [id number]     Impression: Moderate left-sided diverticulosis Mild erythema of the sigmoid colon, improved compared to prior colonoscopy  Biopsied Small internal hemorrhoids Otherwise normal appearing colonic mucosa    Random colon biopsies were obtained RECOMMENDATION:  Await pathology results  Repeat colonoscopy in 5 years  Personal history of colon polyps  Follow-up in GI office as scheduled  Salvatore Hall MD

## 2023-03-27 ENCOUNTER — RA CDI HCC (OUTPATIENT)
Dept: OTHER | Facility: HOSPITAL | Age: 68
End: 2023-03-27

## 2023-03-27 NOTE — PROGRESS NOTES
[facility]  coding opportunities       Chart reviewed, no opportunity found: CHART REVIEWED, NO OPPORTUNITY FOUND        Patients Insurance     Medicare Insurance: Medicare

## 2023-04-03 ENCOUNTER — OFFICE VISIT (OUTPATIENT)
Dept: FAMILY MEDICINE CLINIC | Facility: CLINIC | Age: 68
End: 2023-04-03

## 2023-04-03 VITALS
TEMPERATURE: 97.8 F | SYSTOLIC BLOOD PRESSURE: 118 MMHG | OXYGEN SATURATION: 98 % | DIASTOLIC BLOOD PRESSURE: 72 MMHG | HEART RATE: 66 BPM | BODY MASS INDEX: 22.19 KG/M2 | WEIGHT: 141.4 LBS | HEIGHT: 67 IN

## 2023-04-03 DIAGNOSIS — E44.1 MILD PROTEIN-CALORIE MALNUTRITION (HCC): ICD-10-CM

## 2023-04-03 DIAGNOSIS — F31.9 BIPOLAR 1 DISORDER (HCC): ICD-10-CM

## 2023-04-03 DIAGNOSIS — Z78.0 POST-MENOPAUSE: ICD-10-CM

## 2023-04-03 DIAGNOSIS — Z13.820 OSTEOPOROSIS SCREENING: ICD-10-CM

## 2023-04-03 DIAGNOSIS — K58.2 IRRITABLE BOWEL SYNDROME WITH BOTH CONSTIPATION AND DIARRHEA: ICD-10-CM

## 2023-04-03 DIAGNOSIS — F10.21 RECOVERING ALCOHOLIC IN REMISSION (HCC): ICD-10-CM

## 2023-04-03 DIAGNOSIS — Z00.00 ENCOUNTER FOR ANNUAL WELLNESS VISIT (AWV) IN MEDICARE PATIENT: ICD-10-CM

## 2023-04-03 DIAGNOSIS — K52.832 LYMPHOCYTIC COLITIS: ICD-10-CM

## 2023-04-03 DIAGNOSIS — E78.2 MIXED HYPERLIPIDEMIA: Primary | ICD-10-CM

## 2023-04-03 PROBLEM — L29.9 ITCHING: Status: RESOLVED | Noted: 2019-09-27 | Resolved: 2023-04-03

## 2023-04-03 NOTE — ASSESSMENT & PLAN NOTE
Patient following with GI  She reports she is doing much better    No specific changes from this office, continue to follow with GI

## 2023-04-03 NOTE — ASSESSMENT & PLAN NOTE
On Lexapro  She reports doing well  No longer on Wellbutrin  Negative SI, positive contract  In the future, if there were any worse problems, would consider further evaluation

## 2023-04-03 NOTE — PROGRESS NOTES
Assessment and Plan:     Problem List Items Addressed This Visit     Bipolar 1 disorder ([facility] )     On Lexapro  She reports doing well  No longer on Wellbutrin  Negative SI, positive contract  In the future, if there were any worse problems, would consider further evaluation  Hyperlipidemia - Primary     Patient was not taking the statins lately  Would recommend restart  Recheck in 6 months  Relevant Orders    Cholesterol, total    Comprehensive metabolic panel    HDL cholesterol    LDL cholesterol, direct    IBS (irritable bowel syndrome)     Stable at the moment  Follow with GI  Seems to be doing very well at the moment  Lymphocytic colitis     Patient following with GI  She reports she is doing much better  No specific changes from this office, continue to follow with GI          RESOLVED: Mild protein-calorie malnutrition ([facility] )     Malnutrition Findings:                Noted by GI, but the weight and BMI now are normal                    BMI Findings: Body mass index is 22 15 kg/m²  Recovering alcoholic in remission ([facility] )     Stable  Not drinking alcohol  Other Visit Diagnoses     Encounter for annual wellness visit (AWV) in Medicare patient        Normal AWV  Reviewed about 5 wishes/advanced directives  Osteoporosis screening        Recommend DEXA scan, last was in 2021  Relevant Orders    DXA bone density spine hip and pelvis    Post-menopause        Relevant Orders    DXA bone density spine hip and pelvis           Preventive health issues were discussed with patient, and age appropriate screening tests were ordered as noted in patient's After Visit Summary  Personalized health advice and appropriate referrals for health education or preventive services given if needed, as noted in patient's After Visit Summary  History of Present Illness:     Patient presents for a Medicare Wellness Visit    Here to follow up on multiple issues      Has had a cold recently  Using MVI of late  She feels she is doing well with this  She felt the cold would have been worse without the MVI  Has seen GI of late  She is dx with lymphocytic colitis and IBS  Doing well per patient  IBS: Has been OK  Reviewed diet with GI before  Patient Care Team:  Vanda Kuhn MD as PCP - General (Family Medicine)  Nahomi Jose DO     Review of Systems:     Review of Systems     Problem List:     Patient Active Problem List   Diagnosis   • Acid reflux   • Allergic rhinitis   • Bipolar 1 disorder ([facility] )   • Diverticulosis   • Hyperlipidemia   • Primary localized osteoarthritis of left knee   • Primary osteoarthritis of right knee   • Vitamin D deficiency   • Injury of ankle   • Metatarsalgia   • Hammer toe   • Acquired hallux rigidus   • Recovering alcoholic in remission [facility])   • Atypical chest pain   • Rib fracture   • Syncope and collapse   • Heart valve regurgitation   • Carotid stenosis   • IBS (irritable bowel syndrome)   • Lymphocytic colitis      Past Medical and Surgical History:     Past Medical History:   Diagnosis Date   • Anxiety    • Colon polyp    • COVID-19 virus infection 12/08/2021    Symptoms began 12/29      • Depression    • Diverticulosis    • Hyperlipidemia    • Mild protein-calorie malnutrition ([place] [address] ) 6/44/2998   • Uncomplicated alcohol abuse      Past Surgical History:   Procedure Laterality Date   • BREAST BIOPSY Right     benign   • COLONOSCOPY     • INCISIONAL BREAST BIOPSY        Family History:     Family History   Problem Relation Age of Onset   • Breast cancer Mother 72   • Alcohol abuse Father    • Coronary artery disease Father    • No Known Problems Sister    • No Known Problems Maternal Grandmother    • No Known Problems Maternal Grandfather    • No Known Problems Paternal Grandmother    • No Known Problems Paternal Grandfather    • Alcohol abuse Brother    • Bipolar disorder Brother    • Alcohol abuse Brother    • No Known Problems Maternal Aunt    • No Known Problems Maternal Aunt    • Cancer Maternal Aunt    • No Known Problems Paternal Aunt    • No Known Problems Paternal Aunt       Social History:     Social History     Socioeconomic History   • Marital status: /Civil Union     Spouse name: None   • Number of children: None   • Years of education: None   • Highest education level: None   Occupational History   • None   Tobacco Use   • Smoking status: Former     Types: Cigarettes     Quit date: 2002     Years since quittin 1   • Smokeless tobacco: Never   Vaping Use   • Vaping Use: Never used   Substance and Sexual Activity   • Alcohol use: Not Currently   • Drug use: No   • Sexual activity: Yes   Other Topics Concern   • None   Social History Narrative   • None     Social Determinants of Health     Financial Resource Strain: Low Risk    • Difficulty of Paying Living Expenses: Not hard at all   Food Insecurity: Not on file   Transportation Needs: No Transportation Needs   • Lack of Transportation (Medical): No   • Lack of Transportation (Non-Medical):  No   Physical Activity: Not on file   Stress: Not on file   Social Connections: Not on file   Intimate Partner Violence: Not on file   Housing Stability: Not on file      Medications and Allergies:     Current Outpatient Medications   Medication Sig Dispense Refill   • atorvastatin (LIPITOR) 10 mg tablet TAKE 1 TABLET BY MOUTH EVERY DAY 90 tablet 1   • cholecalciferol (VITAMIN D3) 1,000 units tablet Take 1 tablet (1,000 Units total) by mouth daily 30 tablet 11   • escitalopram (LEXAPRO) 10 mg tablet TAKE 1 TABLET BY MOUTH EVERY DAY 90 tablet 1   • estradiol (ESTRACE) 0 1 mg/g vaginal cream Insert 2 g into the vagina     • hyoscyamine (LEVSIN/SL) 0 125 mg SL tablet Take 1 tablet (0 125 mg total) by mouth every 4 (four) hours as needed for cramping 30 tablet 5   • ibuprofen (MOTRIN) 600 mg tablet      • Multiple Vitamins-Minerals (multivitamin with minerals) tablet Take 1 tablet by mouth daily     • polyethylene glycol (MIRALAX) 17 g packet Take 17 g by mouth daily As needed for constipation 100 each 2   • Sodium Chloride-Xylitol (Xlear Sinus Care Spray) SOLN into each nostril     • Zinc Sulfate (ZINC 15 PO) Take by mouth     • Colesevelam HCl 3 75 g PACK TAKE 3 75 GRAMS BY MOUTH DAILY 90 each 1     No current facility-administered medications for this visit  Allergies   Allergen Reactions   • Alcohol - Food Allergy Confusion and Delirium     In Recovery   • Pravastatin Other (See Comments)     Felt ill   • Singulair [Montelukast] Other (See Comments)     Ineffective     • Zithromax [Azithromycin] Diarrhea and Rash      Immunizations:     Immunization History   Administered Date(s) Administered   • INFLUENZA 01/01/2005, 12/04/2014   • Influenza Quadrivalent Preservative Free 3 years and older IM 09/23/2016   • Influenza, high dose seasonal 0 7 mL 09/24/2018, 10/04/2019   • Influenza, seasonal, injectable 11/15/2011, 11/01/2017   • Pneumococcal Polysaccharide PPV23 09/24/2018   • Tdap 10/04/2015      Health Maintenance:         Topic Date Due   • Hepatitis C Screening  Never done   • DXA SCAN  03/02/2023   • Breast Cancer Screening: Mammogram  08/09/2023   • Colorectal Cancer Screening  02/21/2028         Topic Date Due   • COVID-19 Vaccine (1) Never done   • Pneumococcal Vaccine: 65+ Years (2 - PCV) 09/24/2019   • Influenza Vaccine (1) 09/01/2022      Medicare Screening Tests and Risk Assessments:     Kleber Gonzales is here for her Subsequent Wellness visit  Health Risk Assessment:   Patient rates overall health as very good  Patient feels that their physical health rating is much better  Patient is very satisfied with their life  Eyesight was rated as slightly worse  Hearing was rated as same  Patient feels that their emotional and mental health rating is much better  Patients states they are never, rarely angry  Patient states they are sometimes unusually tired/fatigued   Pain experienced in the last 7 days has been some  Patient's pain rating has been 5/10  Patient states that she has experienced no weight loss or gain in last 6 months  Depression Screening:   PHQ-2 Score: 0      Fall Risk Screening: In the past year, patient has experienced: no history of falling in past year      Urinary Incontinence Screening:   Patient has not leaked urine accidently in the last six months  Home Safety:  Patient does not have trouble with stairs inside or outside of their home  Patient has working smoke alarms and has working carbon monoxide detector  Home safety hazards include: none  pet    Nutrition:   Current diet is Low Saturated Fat, Low Cholesterol and No Added Salt  Medications:   Patient is currently taking over-the-counter supplements  OTC medications include: see medication list  Patient is able to manage medications  Activities of Daily Living (ADLs)/Instrumental Activities of Daily Living (IADLs):   Walk and transfer into and out of bed and chair?: Yes  Dress and groom yourself?: Yes    Bathe or shower yourself?: Yes    Feed yourself? Yes  Do your laundry/housekeeping?: Yes  Manage your money, pay your bills and track your expenses?: Yes  Make your own meals?: Yes    Do your own shopping?: Yes    Previous Hospitalizations:   Any hospitalizations or ED visits within the last 12 months?: No      Advance Care Planning:   Living will: No    Durable POA for healthcare: No    Advanced directive: No    Advanced directive counseling given: Yes    Five wishes given: Yes      Comments: Reviewed about 5 wishes/advanced directives      Cognitive Screening:   Provider or family/friend/caregiver concerned regarding cognition?: No    PREVENTIVE SCREENINGS      Cardiovascular Screening:    General: Screening Not Indicated and History Lipid Disorder      Diabetes Screening:     General: Screening Current      Colorectal Cancer Screening:     General: Screening Current      Breast Cancer "Screening:     General: Screening Current      Cervical Cancer Screening:    General: Screening Not Indicated      Osteoporosis Screening:    General: Risks and Benefits Discussed    Due for: DXA Axial      Abdominal Aortic Aneurysm (AAA) Screening:        General: Screening Not Indicated      Lung Cancer Screening:     General: Screening Not Indicated      Hepatitis C Screening:    General: Screening Not Indicated    Screening, Brief Intervention, and Referral to Treatment (SBIRT)    Screening  Typical number of drinks in a day: 0  Typical number of drinks in a week: 0  Interpretation: Low risk drinking behavior  AUDIT-C Screenin) How often did you have a drink containing alcohol in the past year? never  2) How many drinks did you have on a typical day when you were drinking in the past year? 0  3) How often did you have 6 or more drinks on one occasion in the past year? never    AUDIT-C Score: 0  Interpretation: Score 0-2 (female): Negative screen for alcohol misuse    Single Item Drug Screening:  How often have you used an illegal drug (including marijuana) or a prescription medication for non-medical reasons in the past year? never    Single Item Drug Screen Score: 0  Interpretation: Negative screen for possible drug use disorder    No results found  Vit D 31 6  Sodium 136, potassium 4 1, calcium 9 2  Creatinine 0 57, GFR 94  AST 25, ALT 27  Blood sugar 90  Cholesterol 206, , HDL 54, triglycerides 110  Physical Exam:     /72   Pulse 66   Temp 97 8 °F (36 6 °C) (Tympanic)   Ht 5' 7\" (1 702 m)   Wt 64 1 kg (141 lb 6 4 oz)   SpO2 98%   BMI 22 15 kg/m²     Physical Exam  Vitals and nursing note reviewed  Constitutional:       Appearance: Normal appearance  HENT:      Head: Normocephalic  Cardiovascular:      Rate and Rhythm: Normal rate and regular rhythm  Pulses: Normal pulses  Carotid pulses are 2+ on the right side and 2+ on the left side       Heart " sounds: Normal heart sounds  No murmur heard  No friction rub  No gallop  Pulmonary:      Effort: Pulmonary effort is normal  No respiratory distress  Breath sounds: Normal breath sounds  No stridor  No wheezing, rhonchi or rales  Chest:      Chest wall: No tenderness  Neurological:      Mental Status: She is alert            Johanny Lin MD

## 2023-04-03 NOTE — PATIENT INSTRUCTIONS
1  Mixed hyperlipidemia  Assessment & Plan:  Patient was not taking the statins lately  Would recommend restart  Recheck in 6 months  Orders:  -     Cholesterol, total; Future; Expected date: 10/03/2023  -     Comprehensive metabolic panel; Future; Expected date: 10/03/2023  -     HDL cholesterol; Future; Expected date: 10/03/2023  -     LDL cholesterol, direct; Future; Expected date: 10/03/2023    2  Mild protein-calorie malnutrition ([facility] )  Assessment & Plan:  Malnutrition Findings:                Noted by GI, but the weight and BMI now are normal                    BMI Findings: Body mass index is 22 15 kg/m²  3  Bipolar 1 disorder ([facility] )  Assessment & Plan:  On Lexapro  She reports doing well  No longer on Wellbutrin  Negative SI, positive contract  In the future, if there were any worse problems, would consider further evaluation  4  Irritable bowel syndrome with both constipation and diarrhea  Assessment & Plan:  Stable at the moment  Follow with GI  Seems to be doing very well at the moment  5  Lymphocytic colitis  Assessment & Plan:  Patient following with GI  She reports she is doing much better  No specific changes from this office, continue to follow with GI       6  Recovering alcoholic in remission [facility])  Assessment & Plan:  Stable  Not drinking alcohol  7  Encounter for annual wellness visit (AWV) in Medicare patient  Comments:  Normal AWV  Reviewed about 5 wishes/advanced directives  8  Osteoporosis screening  Comments:  Recommend DEXA scan, last was in 2021  Orders:  -     DXA bone density spine hip and pelvis; Future; Expected date: 04/03/2023    9  Post-menopause  -     DXA bone density spine hip and pelvis; Future; Expected date: 04/03/2023        COVID 19 Instructions    Yessica Darling was advised to limit contact with others to essential tasks such as getting food, medications, and medical care      Proper handwashing reviewed, and Hand sanitzer when washing is not available  If the patient develops symptoms of COVID 19, the patient should call the office as soon as possible  For 5353-7301 Flu season, it is strongly recommended that Flu Vaccinations be obtained  Virtual Visits:  Yue: This works on smart phones (any phone with Internet browsing capability)  You should get a text message when the provider is ready to see you  Click on the link in the text message, and the call should start  You will need to type in your name, and allow camera and microphone access  This is HIPPA compliant, and secure  If you have not already done so, get immunized to COVID 19  We are committed to getting you vaccinated as soon as possible and will be closely following CDC and [facility]  guidelines as they are released and revised  Please refer to our COVID-19 vaccine webpage for the most up to date information on the vaccine and our distribution efforts  This site will also have the most up to date recommendations for COVID booster vaccine  Cb perry    Call [phone]), option 7    OUR NEW LOCATION:    [address], [address]  Fax: [phone]    Lab services and OB/GYN are at this location as well  Medicare Preventive Visit Patient Instructions  Thank you for completing your Welcome to Medicare Visit or Medicare Annual Wellness Visit today  Your next wellness visit will be due in one year (4/3/2024)  The screening/preventive services that you may require over the next 5-10 years are detailed below  Some tests may not apply to you based off risk factors and/or age  Screening tests ordered at today's visit but not completed yet may show as past due  Also, please note that scanned in results may not display below    Preventive Screenings:  Service Recommendations Previous Testing/Comments Colorectal Cancer Screening  * Colonoscopy    * Fecal Occult Blood Test (FOBT)/Fecal Immunochemical Test (FIT)  * Fecal DNA/Cologuard Test  * Flexible Sigmoidoscopy Age: 39-70 years old   Colonoscopy: every 10 years (may be performed more frequently if at higher risk)  OR  FOBT/FIT: every 1 year  OR  Cologuard: every 3 years  OR  Sigmoidoscopy: every 5 years  Screening may be recommended earlier than age 39 if at higher risk for colorectal cancer  Also, an individualized decision between you and your healthcare provider will decide whether screening between the ages of 74-80 would be appropriate  Colonoscopy: 02/22/2023  FOBT/FIT: Not on file  Cologuard: Not on file  Sigmoidoscopy: Not on file    Screening Current     Breast Cancer Screening Age: 36 years old  Frequency: every 1-2 years  Not required if history of left and right mastectomy Mammogram: 08/09/2022    Screening Current   Cervical Cancer Screening Between the ages of 21-29, pap smear recommended once every 3 years  Between the ages of 33-67, can perform pap smear with HPV co-testing every 5 years  Recommendations may differ for women with a history of total hysterectomy, cervical cancer, or abnormal pap smears in past  Pap Smear: Not on file    Screening Not Indicated   Hepatitis C Screening Once for adults born between 1945 and 1965  More frequently in patients at high risk for Hepatitis C Hep C Antibody: Not on file    Screening Not Indicated   Diabetes Screening 1-2 times per year if you're at risk for diabetes or have pre-diabetes Fasting glucose: 90 mg/dL (3/7/2023)  A1C: No results in last 5 years (No results in last 5 years)  Screening Current   Cholesterol Screening Once every 5 years if you don't have a lipid disorder  May order more often based on risk factors   Lipid panel: 03/07/2023    Screening Not Indicated  History Lipid Disorder     Other Preventive Screenings Covered by Medicare:  Abdominal Aortic Aneurysm (AAA) Screening: covered once if your at risk  You're considered to be at risk if you have a family history of AAA  Lung Cancer Screening: covers low dose CT scan once per year if you meet all of the following conditions: (1) Age 50-69; (2) No signs or symptoms of lung cancer; (3) Current smoker or have quit smoking within the last 15 years; (4) You have a tobacco smoking history of at least 20 pack years (packs per day multiplied by number of years you smoked); (5) You get a written order from a healthcare provider  Glaucoma Screening: covered annually if you're considered high risk: (1) You have diabetes OR (2) Family history of glaucoma OR (3)  aged 48 and older OR (3)  American aged 72 and older  Osteoporosis Screening: covered every 2 years if you meet one of the following conditions: (1) You're estrogen deficient and at risk for osteoporosis based off medical history and other findings; (2) Have a vertebral abnormality; (3) On glucocorticoid therapy for more than 3 months; (4) Have primary hyperparathyroidism; (5) On osteoporosis medications and need to assess response to drug therapy  Last bone density test (DXA Scan): 03/03/2021  HIV Screening: covered annually if you're between the age of 12-76  Also covered annually if you are younger than 13 and older than 72 with risk factors for HIV infection  For pregnant patients, it is covered up to 3 times per pregnancy      Immunizations:  Immunization Recommendations   Influenza Vaccine Annual influenza vaccination during flu season is recommended for all persons aged >= 6 months who do not have contraindications   Pneumococcal Vaccine   * Pneumococcal conjugate vaccine = PCV13 (Prevnar 13), PCV15 (Vaxneuvance), PCV20 (Prevnar 20)  * Pneumococcal polysaccharide vaccine = PPSV23 (Pneumovax) Adults 25-60 years old: 1-3 doses may be recommended based on certain risk factors  Adults 72 years old: 1-2 doses may be recommended based off what pneumonia vaccine you previously received   Hepatitis B Vaccine 3 dose series if at intermediate or high risk (ex: diabetes, end stage renal disease, liver disease)   Tetanus (Td) Vaccine - COST NOT COVERED BY MEDICARE PART B Following completion of primary series, a booster dose should be given every 10 years to maintain immunity against tetanus  Td may also be given as tetanus wound prophylaxis  Tdap Vaccine - COST NOT COVERED BY MEDICARE PART B Recommended at least once for all adults  For pregnant patients, recommended with each pregnancy  Shingles Vaccine (Shingrix) - COST NOT COVERED BY MEDICARE PART B  2 shot series recommended in those aged 48 and above     Health Maintenance Due:      Topic Date Due    Hepatitis C Screening  Never done    DXA SCAN  03/02/2023    Breast Cancer Screening: Mammogram  08/09/2023    Colorectal Cancer Screening  02/21/2028     Immunizations Due:      Topic Date Due    COVID-19 Vaccine (1) Never done    Pneumococcal Vaccine: 65+ Years (2 - PCV) 09/24/2019    Influenza Vaccine (1) 09/01/2022     Advance Directives   What are advance directives? Advance directives are legal documents that state your wishes and plans for medical care  These plans are made ahead of time in case you lose your ability to make decisions for yourself  Advance directives can apply to any medical decision, such as the treatments you want, and if you want to donate organs  What are the types of advance directives? There are many types of advance directives, and each state has rules about how to use them  You may choose a combination of any of the following:  Living will: This is a written record of the treatment you want  You can also choose which treatments you do not want, which to limit, and which to stop at a certain time  This includes surgery, medicine, IV fluid, and tube feedings  Durable power of  for healthcare [facility]):   This is a written record that states who you want to make healthcare choices for you when you are unable to make them for yourself  This person, called a proxy, is usually a family member or a friend  You may choose more than 1 proxy  Do not resuscitate (DNR) order:  A DNR order is used in case your heart stops beating or you stop breathing  It is a request not to have certain forms of treatment, such as CPR  A DNR order may be included in other types of advance directives  Medical directive: This covers the care that you want if you are in a coma, near death, or unable to make decisions for yourself  You can list the treatments you want for each condition  Treatment may include pain medicine, surgery, blood transfusions, dialysis, IV or tube feedings, and a ventilator (breathing machine)  Values history: This document has questions about your views, beliefs, and how you feel and think about life  This information can help others choose the care that you would choose  Why are advance directives important? An advance directive helps you control your care  Although spoken wishes may be used, it is better to have your wishes written down  Spoken wishes can be misunderstood, or not followed  Treatments may be given even if you do not want them  An advance directive may make it easier for your family to make difficult choices about your care  Urinary Incontinence   Urinary incontinence (UI)  is when you lose control of your bladder  UI develops because your bladder cannot store or empty urine properly  The 3 most common types of UI are stress incontinence, urge incontinence, or both  Medicines:   May be given to help strengthen your bladder control  Report any side effects of medication to your healthcare provider  Do pelvic muscle exercises often:  Your pelvic muscles help you stop urinating  Squeeze these muscles tight for 5 seconds, then relax for 5 seconds  Gradually work up to squeezing for 10 seconds  Do 3 sets of 15 repetitions a day, or as directed   This will help strengthen your pelvic muscles and improve bladder control  Train your bladder:  Go to the bathroom at set times, such as every 2 hours, even if you do not feel the urge to go  You can also try to hold your urine when you feel the urge to go  For example, hold your urine for 5 minutes when you feel the urge to go  As that becomes easier, hold your urine for 10 minutes  Self-care:   Keep a UI record  Write down how often you leak urine and how much you leak  Make a note of what you were doing when you leaked urine  Drink liquids as directed  You may need to limit the amount of liquid you drink to help control your urine leakage  Do not drink any liquid right before you go to bed  Limit or do not have drinks that contain caffeine or alcohol  Prevent constipation  Eat a variety of high-fiber foods  Good examples are high-fiber cereals, beans, vegetables, and whole-grain breads  Walking is the best way to trigger your intestines to have a bowel movement  Exercise regularly and maintain a healthy weight  Weight loss and exercise will decrease pressure on your bladder and help you control your leakage  Use a catheter as directed  to help empty your bladder  A catheter is a tiny, plastic tube that is put into your bladder to drain your urine  Go to behavior therapy as directed  Behavior therapy may be used to help you learn to control your urge to urinate  © Copyright [employer] 2018 Information is for End User's use only and may not be sold, redistributed or otherwise used for commercial purposes   All illustrations and images included in CareNotes® are the copyrighted property of [employer]  or [address] [place] [address] [employer]  oral

## 2023-04-03 NOTE — ASSESSMENT & PLAN NOTE
Malnutrition Findings:                Noted by GI, but the weight and BMI now are normal                    BMI Findings: Body mass index is 22 15 kg/m²

## 2023-07-04 DIAGNOSIS — E78.2 MIXED HYPERLIPIDEMIA: ICD-10-CM

## 2023-07-05 RX ORDER — ATORVASTATIN CALCIUM 10 MG/1
TABLET, FILM COATED ORAL
Qty: 90 TABLET | Refills: 1 | Status: SHIPPED | OUTPATIENT
Start: 2023-07-05

## 2023-08-02 ENCOUNTER — OFFICE VISIT (OUTPATIENT)
Dept: URGENT CARE | Facility: MEDICAL CENTER | Age: 68
End: 2023-08-02
Payer: MEDICARE

## 2023-08-02 VITALS
RESPIRATION RATE: 18 BRPM | HEART RATE: 61 BPM | OXYGEN SATURATION: 100 % | TEMPERATURE: 97.4 F | DIASTOLIC BLOOD PRESSURE: 61 MMHG | SYSTOLIC BLOOD PRESSURE: 123 MMHG

## 2023-08-02 DIAGNOSIS — J02.9 SORE THROAT: ICD-10-CM

## 2023-08-02 DIAGNOSIS — J06.9 VIRAL URI WITH COUGH: Primary | ICD-10-CM

## 2023-08-02 LAB
S PYO AG THROAT QL: NEGATIVE
SARS-COV-2 AG UPPER RESP QL IA: NEGATIVE
VALID CONTROL: NORMAL

## 2023-08-02 PROCEDURE — G0463 HOSPITAL OUTPT CLINIC VISIT: HCPCS

## 2023-08-02 PROCEDURE — 87070 CULTURE OTHR SPECIMN AEROBIC: CPT

## 2023-08-02 PROCEDURE — 87880 STREP A ASSAY W/OPTIC: CPT

## 2023-08-02 PROCEDURE — 99213 OFFICE O/P EST LOW 20 MIN: CPT

## 2023-08-02 PROCEDURE — 87811 SARS-COV-2 COVID19 W/OPTIC: CPT

## 2023-08-03 NOTE — PATIENT INSTRUCTIONS
Rapid COVID and rapid strep test were negative. A throat culture was sent out. You will be contacted if the test comes back positive. Your symptoms are likely due to a viral illness. The mainstay of treatment is for symptom relief, see below for recommended medications. Fever/Body Aches: We recommend you take 600mg ibuprofen every 6 hours or tylenol 650mg every 6 hours as needed for fever. If needed, you can alternate these medications so that you take one medication every 3 hours. For instance, at noon take ibuprofen, then at 3pm take tylenol, then at 6pm take ibuprofen. Cough: Delsym, an over the counter cough medication may be used every 12 hours as needed. Mucinex XR (guafenisen) 600 mg tablets may be used to thin out the mucous to make it easier to cough up. You may take 1-2 tablets twice per day as needed. Utilizing a vaporizer/humidifier may help, as well as increasing fluids. Sore Throat: Salt water gargles with 1 teaspoon of salt dissolved in 6-8 oz of water as needed can help with a sore throat, as can honey, drinking plenty of liquids, eating soft foods. If severe, can utilize OTC chloraseptic spray. Nasal Congestion: Over the counter allergy medication like Claritin, Allegra or Zyrtec can help with nasal congestion and post nasal drip. Over the counter saline or steroid nasal sprays like Flonase can help with nasal congestion and post nasal drip as well. Over the counter decongestants such as Sudafed may also help. Upset Stomach: Drink plenty of fluids. May utilize Gatorade, Pedialyte, or other electrolyte solutions to supplement. Eat bland foods (ex: BRAT - bananas, rice, applesauce, toast) and slowly advance to other foods as tolerated. Follow up with PCP in 3-5 days. Proceed to  ER if symptoms worsen. Upper Respiratory Infection   AMBULATORY CARE:   An upper respiratory infection  is also called a cold. Your nose, throat, ears, and sinuses may be affected.  You are more likely to get a cold in the winter. Your risk of getting a cold may be increased if you smoke cigarettes or have allergies, such as hay fever. What causes a cold? A cold is caused by a virus. Many viruses can cause a cold, and each is contagious. This means the virus can be easily spread to another person when the sick person coughs or sneezes. The virus can also be spread if you touch an object the virus is on and then touch your eyes, mouth, or nose. Cold symptoms  are usually worst for the first 3 to 5 days. You may have any of the following:  Runny or stuffy nose    Sneezing and coughing    Sore throat or hoarseness    Red, watery, and sore eyes    Fatigue (you feel more tired than usual)    Chills and fever    Headache, body aches, or sore muscles    Call your local emergency number (911 in the [address]) if:   You have chest pain or trouble breathing. Seek care immediately if:   You have a fever over 102ºF (39ºC). Call your doctor if:   You have a low fever. Your sore throat gets worse or you see white or yellow spots in your throat. Your symptoms get worse after 3 to 5 days or are not better in 14 days. You have a rash anywhere on your skin. You have large, tender lumps in your neck. You have thick, green, or yellow drainage from your nose. You cough up thick yellow, green, or bloody mucus. You have a bad earache. You have questions or concerns about your condition or care. Treatment:  Colds are caused by viruses and do not get better with antibiotics. Most people get better in 7 to 14 days. You may continue to cough for 2 to 3 weeks. The following may help decrease your symptoms:  Decongestants  help reduce nasal congestion and help you breathe more easily. If you take decongestant pills, they may make you feel restless or not able to sleep. Do not use decongestant sprays for more than a few days. Cough suppressants  help reduce coughing.  Ask your healthcare provider which type of cough medicine is best for you. NSAIDs , such as ibuprofen, help decrease swelling, pain, and fever. NSAIDs can cause stomach bleeding or kidney problems in certain people. If you take blood thinner medicine, always ask your healthcare provider if NSAIDs are safe for you. Always read the medicine label and follow directions. Acetaminophen  decreases pain and fever. It is available without a doctor's order. Ask how much to take and how often to take it. Follow directions. Read the labels of all other medicines you are using to see if they also contain acetaminophen, or ask your doctor or pharmacist. Acetaminophen can cause liver damage if not taken correctly. Manage a cold:   Rest as much as possible. Slowly start to do more each day. Drink more liquids as directed. Liquids will help thin and loosen mucus so you can cough it up. Liquids will also help prevent dehydration. Liquids that help prevent dehydration include water, fruit juice, and broth. Do not drink liquids that contain caffeine. Caffeine can increase your risk for dehydration. Ask your healthcare provider how much liquid to drink each day. Soothe a sore throat. Gargle with warm salt water. Make salt water by dissolving ¼ teaspoon salt in 1 cup warm water. You may also suck on hard candy or throat lozenges. You may use a sore throat spray. Use a humidifier or vaporizer. Use a cool mist humidifier or a vaporizer to increase air moisture in your home. This may make it easier for you to breathe and help decrease your cough. Use saline nasal drops as directed. These help relieve congestion. Apply petroleum-based jelly around the outside of your nostrils. This can decrease irritation from blowing your nose. Do not smoke. Nicotine and other chemicals in cigarettes and cigars can make your symptoms worse. They can also cause infections such as bronchitis or pneumonia.  Ask your healthcare provider for information if you currently smoke and need help to quit. E-cigarettes or smokeless tobacco still contain nicotine. Talk to your healthcare provider before you use these products. Prevent a cold: Wash your hands often. Use soap and water every time you wash your hands. Rub your soapy hands together, lacing your fingers. Use the fingers of one hand to scrub under the nails of the other hand. Wash for at least 20 seconds. Rinse with warm, running water for several seconds. Then dry your hands. Use hand  gel if soap and water are not available. Do not touch your eyes or mouth without washing your hands first.         Cover a sneeze or cough. Use a tissue that covers your mouth and nose. Put the used tissue in the trash right away. Use the bend of your arm if a tissue is not available. Wash your hands well with soap and water or use a hand . Do not stand close to anyone who is sneezing or coughing. Try to stay away from others while you are sick. This is especially important during the first 2 to 3 days when the virus is more easily spread. Wait until a fever, cough, or other symptoms are gone before you return to work or other regular activities. Do not share items while you are sick. This includes food, drinks, eating utensils, and dishes. Follow up with your doctor as directed:  Write down your questions so you remember to ask them during your visits. © Copyright [employer] 2022 Information is for End User's use only and may not be sold, redistributed or otherwise used for commercial purposes. The above information is an  only. It is not intended as medical advice for individual conditions or treatments. Talk to your doctor, nurse or pharmacist before following any medical regimen to see if it is safe and effective for you.

## 2023-08-03 NOTE — PROGRESS NOTES
[facility]        NAME: Ramona Ny is a 77 y.o. female  : 1955    MRN: [id number]  DATE: 2023  TIME: 8:55 PM    Assessment and Plan   Viral URI with cough [J06.9]  1. Viral URI with cough        2. Sore throat  POCT rapid strepA    Poct Covid 19 Rapid Antigen Test    Throat culture        Rapid COVID test: Negative. Rapid strep test: Negative. Throat culture sent out. Advised symptomatic treatment, PCP follow up. Advised patient to stop taking hydroxychloroquine. Patient Instructions     Rapid COVID and rapid strep test were negative. A throat culture was sent out. You will be contacted if the test comes back positive. Your symptoms are likely due to a viral illness. The mainstay of treatment is for symptom relief, see below for recommended medications. Fever/Body Aches: We recommend you take 600mg ibuprofen every 6 hours or tylenol 650mg every 6 hours as needed for fever. If needed, you can alternate these medications so that you take one medication every 3 hours. For instance, at noon take ibuprofen, then at 3pm take tylenol, then at 6pm take ibuprofen. Cough: Delsym, an over the counter cough medication may be used every 12 hours as needed. Mucinex XR (guafenisen) 600 mg tablets may be used to thin out the mucous to make it easier to cough up. You may take 1-2 tablets twice per day as needed. Utilizing a vaporizer/humidifier may help, as well as increasing fluids. Sore Throat: Salt water gargles with 1 teaspoon of salt dissolved in 6-8 oz of water as needed can help with a sore throat, as can honey, drinking plenty of liquids, eating soft foods. If severe, can utilize OTC chloraseptic spray. Nasal Congestion: Over the counter allergy medication like Claritin, Allegra or Zyrtec can help with nasal congestion and post nasal drip. Over the counter saline or steroid nasal sprays like Flonase can help with nasal congestion and post nasal drip as well.  Over the counter decongestants such as Sudafed may also help. Upset Stomach: Drink plenty of fluids. May utilize Gatorade, Pedialyte, or other electrolyte solutions to supplement. Eat bland foods (ex: BRAT - bananas, rice, applesauce, toast) and slowly advance to other foods as tolerated. Follow up with PCP in 3-5 days. Proceed to  ER if symptoms worsen. Chief Complaint     Chief Complaint   Patient presents with   • Sore Throat     Pt C/O sore throat since Sunday. Pt reports being exposed to covid by a family member. • COVID-19 Exposure         History of Present Illness       Sore Throat   This is a new problem. Episode onset: 3 days ago. The problem has been gradually worsening. Neither side of throat is experiencing more pain than the other. There has been no fever. The pain is at a severity of 3/10. Associated symptoms include coughing (dry). Pertinent negatives include no abdominal pain, congestion, diarrhea, ear pain, shortness of breath or vomiting. Exposure to: COVID. Treatments tried: hydroxychloroquine, zinc, multivitamins. The treatment provided mild relief. Review of Systems   Review of Systems   Constitutional: Positive for appetite change (slightly decreased due to colitis issues). Negative for chills and fever. HENT: Positive for sore throat. Negative for congestion, ear pain and rhinorrhea. Respiratory: Positive for cough (dry). Negative for chest tightness, shortness of breath and wheezing. Gastrointestinal: Negative for abdominal pain, diarrhea, nausea and vomiting. Musculoskeletal: Negative for myalgias. Skin: Negative for rash.          Current Medications       Current Outpatient Medications:   •  atorvastatin (LIPITOR) 10 mg tablet, TAKE 1 TABLET BY MOUTH EVERY DAY, Disp: 90 tablet, Rfl: 1  •  escitalopram (LEXAPRO) 10 mg tablet, TAKE 1 TABLET BY MOUTH EVERY DAY, Disp: 90 tablet, Rfl: 1  •  estradiol (ESTRACE) 0.1 mg/g vaginal cream, Insert 2 g into the vagina, Disp: , Rfl:   • hyoscyamine (LEVSIN/SL) 0.125 mg SL tablet, Take 1 tablet (0.125 mg total) by mouth every 4 (four) hours as needed for cramping, Disp: 30 tablet, Rfl: 5  •  ibuprofen (MOTRIN) 600 mg tablet, , Disp: , Rfl:   •  Multiple Vitamins-Minerals (multivitamin with minerals) tablet, Take 1 tablet by mouth daily, Disp: , Rfl:   •  Sodium Chloride-Xylitol (Xlear Sinus Care Spray) SOLN, into each nostril, Disp: , Rfl:   •  Zinc Sulfate (ZINC 15 PO), Take by mouth, Disp: , Rfl:   •  cholecalciferol (VITAMIN D3) 1,000 units tablet, Take 1 tablet (1,000 Units total) by mouth daily, Disp: 30 tablet, Rfl: 11  •  Colesevelam HCl 3.75 g PACK, TAKE 3.75 GRAMS BY MOUTH DAILY, Disp: 90 each, Rfl: 1  •  polyethylene glycol (MIRALAX) 17 g packet, Take 17 g by mouth daily As needed for constipation (Patient not taking: Reported on 8/2/2023), Disp: 100 each, Rfl: 2    Current Allergies     Allergies as of 08/02/2023 - Reviewed 08/02/2023   Allergen Reaction Noted   • Alcohol - food allergy Confusion and Delirium 04/19/2019   • Pravastatin Other (See Comments) 10/04/2019   • Singulair [montelukast] Other (See Comments) 06/30/2022   • Zithromax [azithromycin] Diarrhea and Rash 10/08/2019            The following portions of the patient's history were reviewed and updated as appropriate: allergies, current medications, past family history, past medical history, past social history, past surgical history and problem list.     Past Medical History:   Diagnosis Date   • Anxiety    • Colon polyp    • COVID-19 virus infection 12/08/2021    Symptoms began 12/29.     • Depression    • Diverticulosis    • Hyperlipidemia    • Mild protein-calorie malnutrition ([address]) 0/00/5846   • Uncomplicated alcohol abuse        Past Surgical History:   Procedure Laterality Date   • BREAST BIOPSY Right     benign   • COLONOSCOPY     • INCISIONAL BREAST BIOPSY         Family History   Problem Relation Age of Onset   • Breast cancer Mother 72   • Alcohol abuse Father    • Coronary artery disease Father    • No Known Problems Sister    • No Known Problems Maternal Grandmother    • No Known Problems Maternal Grandfather    • No Known Problems Paternal Grandmother    • No Known Problems Paternal Grandfather    • Alcohol abuse Brother    • Bipolar disorder Brother    • Alcohol abuse Brother    • No Known Problems Maternal Aunt    • No Known Problems Maternal Aunt    • Cancer Maternal Aunt    • No Known Problems Paternal Aunt    • No Known Problems Paternal Aunt          Medications have been verified. Objective   /61 (BP Location: Left arm, Patient Position: Sitting, Cuff Size: Standard)   Pulse 61   Temp (!) 97.4 °F (36.3 °C) (Tympanic)   Resp 18   SpO2 100%        Physical Exam     Physical Exam  Vitals and nursing note reviewed. Constitutional:       General: She is not in acute distress. Appearance: Normal appearance. She is not ill-appearing. HENT:      Right Ear: Tympanic membrane, ear canal and external ear normal.      Left Ear: Tympanic membrane, ear canal and external ear normal.      Nose: Nose normal. No congestion or rhinorrhea. Mouth/Throat:      Mouth: Mucous membranes are moist.      Pharynx: No oropharyngeal exudate or posterior oropharyngeal erythema. Eyes:      General:         Right eye: No discharge. Left eye: No discharge. Cardiovascular:      Rate and Rhythm: Normal rate and regular rhythm. Pulses: Normal pulses. Heart sounds: Normal heart sounds. Pulmonary:      Effort: Pulmonary effort is normal.      Breath sounds: Normal breath sounds. No stridor. No wheezing, rhonchi or rales. Musculoskeletal:      Cervical back: Neck supple. No tenderness. Lymphadenopathy:      Cervical: No cervical adenopathy. Neurological:      Mental Status: She is alert.

## 2023-08-04 LAB — BACTERIA THROAT CULT: NORMAL

## 2023-12-08 DIAGNOSIS — F31.9 BIPOLAR 1 DISORDER (HCC): ICD-10-CM

## 2023-12-08 RX ORDER — ESCITALOPRAM OXALATE 10 MG/1
TABLET ORAL
Qty: 90 TABLET | Refills: 1 | Status: SHIPPED | OUTPATIENT
Start: 2023-12-08

## 2023-12-15 ENCOUNTER — TELEPHONE (OUTPATIENT)
Age: 68
End: 2023-12-15

## 2023-12-15 NOTE — TELEPHONE ENCOUNTER
Spoke to pt.   Mailed FODMAP diet to home address and will call back with website once I hear back from

## 2023-12-15 NOTE — TELEPHONE ENCOUNTER
Patients GI provider:  Dr. Iker Mann    Number to return call: ([phone]    Reason for call: Pt called to ask Dr Iker Mann about a website she recommended for [facility]. Could you please reach out to pt and let her know which website.      Scheduled procedure/appointment date if applicable: Apt/procedure N/A

## 2023-12-28 ENCOUNTER — LAB (OUTPATIENT)
Dept: LAB | Facility: MEDICAL CENTER | Age: 68
End: 2023-12-28
Payer: MEDICARE

## 2023-12-28 DIAGNOSIS — E78.2 MIXED HYPERLIPIDEMIA: ICD-10-CM

## 2023-12-28 LAB
ALBUMIN SERPL BCP-MCNC: 4.3 G/DL (ref 3.5–5)
ALP SERPL-CCNC: 45 U/L (ref 34–104)
ALT SERPL W P-5'-P-CCNC: 18 U/L (ref 7–52)
ANION GAP SERPL CALCULATED.3IONS-SCNC: 4 MMOL/L
AST SERPL W P-5'-P-CCNC: 23 U/L (ref 13–39)
BILIRUB SERPL-MCNC: 0.48 MG/DL (ref 0.2–1)
BUN SERPL-MCNC: 14 MG/DL (ref 5–25)
CALCIUM SERPL-MCNC: 9.2 MG/DL (ref 8.4–10.2)
CHLORIDE SERPL-SCNC: 105 MMOL/L (ref 96–108)
CHOLEST SERPL-MCNC: 225 MG/DL
CO2 SERPL-SCNC: 33 MMOL/L (ref 21–32)
CREAT SERPL-MCNC: 0.64 MG/DL (ref 0.6–1.3)
GFR SERPL CREATININE-BSD FRML MDRD: 90 ML/MIN/1.73SQ M
GLUCOSE P FAST SERPL-MCNC: 95 MG/DL (ref 65–99)
HDLC SERPL-MCNC: 55 MG/DL
LDLC SERPL DIRECT ASSAY-MCNC: 157 MG/DL (ref 0–100)
POTASSIUM SERPL-SCNC: 4.2 MMOL/L (ref 3.5–5.3)
PROT SERPL-MCNC: 7.2 G/DL (ref 6.4–8.4)
SODIUM SERPL-SCNC: 142 MMOL/L (ref 135–147)

## 2023-12-28 PROCEDURE — 80053 COMPREHEN METABOLIC PANEL: CPT

## 2023-12-28 PROCEDURE — 83721 ASSAY OF BLOOD LIPOPROTEIN: CPT

## 2023-12-28 PROCEDURE — 36415 COLL VENOUS BLD VENIPUNCTURE: CPT

## 2023-12-28 PROCEDURE — 82465 ASSAY BLD/SERUM CHOLESTEROL: CPT

## 2023-12-28 PROCEDURE — 83718 ASSAY OF LIPOPROTEIN: CPT

## 2023-12-29 NOTE — RESULT ENCOUNTER NOTE
Lab/result abnormal.  Needs to schedule visit sooner than next scheduled.  Please call if not seen in MyChart.

## 2024-01-10 ENCOUNTER — OFFICE VISIT (OUTPATIENT)
Dept: FAMILY MEDICINE CLINIC | Facility: CLINIC | Age: 69
End: 2024-01-10
Payer: MEDICARE

## 2024-01-10 VITALS
HEIGHT: 67 IN | OXYGEN SATURATION: 98 % | SYSTOLIC BLOOD PRESSURE: 118 MMHG | DIASTOLIC BLOOD PRESSURE: 62 MMHG | RESPIRATION RATE: 18 BRPM | BODY MASS INDEX: 23.23 KG/M2 | HEART RATE: 78 BPM | WEIGHT: 148 LBS

## 2024-01-10 DIAGNOSIS — F10.21 RECOVERING ALCOHOLIC IN REMISSION (HCC): ICD-10-CM

## 2024-01-10 DIAGNOSIS — F31.9 BIPOLAR 1 DISORDER (HCC): ICD-10-CM

## 2024-01-10 DIAGNOSIS — Z23 NEED FOR COVID-19 VACCINE: ICD-10-CM

## 2024-01-10 DIAGNOSIS — Z23 NEED FOR VACCINATION FOR STREP PNEUMONIAE: ICD-10-CM

## 2024-01-10 DIAGNOSIS — Z13.820 OSTEOPOROSIS SCREENING: ICD-10-CM

## 2024-01-10 DIAGNOSIS — E78.2 MIXED HYPERLIPIDEMIA: Primary | ICD-10-CM

## 2024-01-10 DIAGNOSIS — K58.2 IRRITABLE BOWEL SYNDROME WITH BOTH CONSTIPATION AND DIARRHEA: ICD-10-CM

## 2024-01-10 DIAGNOSIS — Z23 NEED FOR ZOSTER VACCINE: ICD-10-CM

## 2024-01-10 DIAGNOSIS — E55.9 VITAMIN D DEFICIENCY: ICD-10-CM

## 2024-01-10 DIAGNOSIS — K52.832 LYMPHOCYTIC COLITIS: ICD-10-CM

## 2024-01-10 DIAGNOSIS — Z29.11 NEED FOR RSV VACCINATION: ICD-10-CM

## 2024-01-10 DIAGNOSIS — Z23 NEED FOR INFLUENZA VACCINATION: ICD-10-CM

## 2024-01-10 PROBLEM — S22.39XA RIB FRACTURE: Status: RESOLVED | Noted: 2021-01-26 | Resolved: 2024-01-10

## 2024-01-10 PROBLEM — M77.40 METATARSALGIA: Status: RESOLVED | Noted: 2018-11-11 | Resolved: 2024-01-10

## 2024-01-10 PROCEDURE — 99214 OFFICE O/P EST MOD 30 MIN: CPT | Performed by: FAMILY MEDICINE

## 2024-01-10 NOTE — PROGRESS NOTES
Name: Jinny Oneil      : 1955      MRN: [id number]  Encounter Provider: Shahid Serrano MD  Encounter Date: 1/10/2024   Encounter department: [facility]    Assessment & Plan     1. Mixed hyperlipidemia  Assessment & Plan:  Recommend restart atorvastatin 10 mg for reduction cardiac risk.  Reviewed about reasons to reduce LDL.  HDL was fantastic, so her supplements over-the-counter seem to be helping with that as well.  Check in 6 months.    Orders:  -     Cholesterol, total; Future; Expected date: 07/10/2024  -     Comprehensive metabolic panel; Future; Expected date: 07/10/2024  -     HDL cholesterol; Future; Expected date: 07/10/2024  -     LDL cholesterol, direct; Future; Expected date: 07/10/2024    2. Bipolar 1 disorder (HCC)  Assessment & Plan:  Follow with OV in 6 months. Doing well, so no changes in medications.      3. Irritable bowel syndrome with both constipation and diarrhea  Assessment & Plan:  Patient does follow-up with gastroenterology.  No changes.      4. Lymphocytic colitis  Assessment & Plan:  Patient to follow with GI.  No specific changes.  She is on a modified low FODMAP diet.  She has identified several things that seem to make problems worse, and avoiding those.      5. Recovering alcoholic in remission (HCC)  Assessment & Plan:  Stable. No issues.        6. Vitamin D deficiency  Assessment & Plan:  Can get Vit D with next labs. No changes.      Orders:  -     Vitamin D 25 hydroxy; Future; Expected date: 07/10/2024    7. Osteoporosis screening  Comments:  Due for DEXA. Was ordered before.  Check Vit D.  Orders:  -     Vitamin D 25 hydroxy; Future; Expected date: 07/10/2024    8. Need for RSV vaccination  Comments:  Briefly reviewed.    9. Need for COVID-19 vaccine  Comments:  Reviewed.  Declined.    10. Need for vaccination for Strep pneumoniae  Comments:  recommended Prevnar.    11. Need for influenza vaccination  Comments:  Recommend yearly flu  vaccine.    12. Need for zoster vaccine  Comments:  Would recommend shingles vaccine.           Subjective      Chief Complaint   Patient presents with   • Follow-up     Labs results       Here to follow up on medical problems.    Cholesterol: Reviewed diet.  Reviewed labs.  Lipitor: She was not taking Lipitor with this lab.  She was thinking that as she was thinner, she did not want to take the meds.  She is using over-the-counter supplements that included Q10 and fish oils.  Reviewed briefly about red rice yeast.    IBS/lymphocytic colitis: She does have problems.  Has changed diet, and follows with GI.    Bipolar: Has been stable. Using Lexapro.    Vit D: No recent check.    She did mention there is some neck pain.  Radiation of the pain of headache to the head.  Following with chiropractic.  Dr Lu.  She feels she is doing well.      Review of Systems   Constitutional: Negative.    HENT: Negative.     Eyes: Negative.    Respiratory: Negative.     Cardiovascular: Negative.    Gastrointestinal:         GI issues.  See HPI.   Endocrine: Negative.    Genitourinary: Negative.    Musculoskeletal: Negative.    Skin: Negative.    Allergic/Immunologic: Negative.    Neurological: Negative.    Hematological: Negative.    Psychiatric/Behavioral: Negative.         Current Outpatient Medications on File Prior to Visit   Medication Sig   • atorvastatin (LIPITOR) 10 mg tablet TAKE 1 TABLET BY MOUTH EVERY DAY   • escitalopram (LEXAPRO) 10 mg tablet TAKE 1 TABLET BY MOUTH EVERY DAY   • estradiol (ESTRACE) 0.1 mg/g vaginal cream Insert 2 g into the vagina   • hyoscyamine (LEVSIN/SL) 0.125 mg SL tablet Take 1 tablet (0.125 mg total) by mouth every 4 (four) hours as needed for cramping   • Multiple Vitamins-Minerals (multivitamin with minerals) tablet Take 1 tablet by mouth daily   • polyethylene glycol (MIRALAX) 17 g packet Take 17 g by mouth daily As needed for constipation   • Sodium Chloride-Xylitol (Xlear Sinus Care Spray)  "SOLN into each nostril   • Zinc Sulfate (ZINC 15 PO) Take by mouth   • [DISCONTINUED] ibuprofen (MOTRIN) 600 mg tablet    • cholecalciferol (VITAMIN D3) 1,000 units tablet Take 1 tablet (1,000 Units total) by mouth daily   • [DISCONTINUED] Colesevelam HCl 3.75 g PACK TAKE 3.75 GRAMS BY MOUTH DAILY       Objective     Sodium 142, potassium 4.2, calcium 9.2.  Blood sugar 95.  Creatinine 0.64, GFR 90.  AST 23, ALT 18.  Total cholesterol 225.  .  HDL 55    /62 (BP Location: Right arm, Patient Position: Sitting, Cuff Size: Standard)   Pulse 78   Resp 18   Ht 5' 7\" (1.702 m)   Wt 67.1 kg (148 lb)   SpO2 98%   BMI 23.18 kg/m²     Physical Exam  Vitals and nursing note reviewed.   Constitutional:       Appearance: Normal appearance. She is well-developed.   HENT:      Head: Normocephalic and atraumatic.   Cardiovascular:      Rate and Rhythm: Normal rate and regular rhythm.      Pulses:           Carotid pulses are 2+ on the right side and 2+ on the left side.     Heart sounds: Normal heart sounds.   Pulmonary:      Effort: Pulmonary effort is normal.      Breath sounds: Normal breath sounds. No wheezing or rales.   Chest:      Chest wall: No tenderness.   Neurological:      Mental Status: She is alert.       Shahid Serrano MD    "

## 2024-01-10 NOTE — ASSESSMENT & PLAN NOTE
Recommend restart atorvastatin 10 mg for reduction cardiac risk.  Reviewed about reasons to reduce LDL.  HDL was fantastic, so her supplements over-the-counter seem to be helping with that as well.  Check in 6 months.

## 2024-01-10 NOTE — PATIENT INSTRUCTIONS
1. Mixed hyperlipidemia  Assessment & Plan:  Recommend restart atorvastatin 10 mg for reduction cardiac risk.  Reviewed about reasons to reduce LDL.  HDL was fantastic, so her supplements over-the-counter seem to be helping with that as well.  Check in 6 months.    Orders:  -     Cholesterol, total; Future; Expected date: 07/10/2024  -     Comprehensive metabolic panel; Future; Expected date: 07/10/2024  -     HDL cholesterol; Future; Expected date: 07/10/2024  -     LDL cholesterol, direct; Future; Expected date: 07/10/2024    2. Bipolar 1 disorder (HCC)  Assessment & Plan:  Follow with OV in 6 months. Doing well, so no changes in medications.      3. Irritable bowel syndrome with both constipation and diarrhea  Assessment & Plan:  Patient does follow-up with gastroenterology.  No changes.      4. Lymphocytic colitis  Assessment & Plan:  Patient to follow with GI.  No specific changes.  She is on a modified low FODMAP diet.  She has identified several things that seem to make problems worse, and avoiding those.      5. Recovering alcoholic in remission (HCC)  Assessment & Plan:  Stable. No issues.        6. Vitamin D deficiency  Assessment & Plan:  Can get Vit D with next labs. No changes.      Orders:  -     Vitamin D 25 hydroxy; Future; Expected date: 07/10/2024    7. Osteoporosis screening  Comments:  Due for DEXA. Was ordered before.  Check Vit D.  Orders:  -     Vitamin D 25 hydroxy; Future; Expected date: 07/10/2024    8. Need for RSV vaccination  Comments:  Briefly reviewed.    9. Need for COVID-19 vaccine  Comments:  Reviewed.  Declined.    10. Need for vaccination for Strep pneumoniae  Comments:  recommended Prevnar.    11. Need for influenza vaccination  Comments:  Recommend yearly flu vaccine.    12. Need for zoster vaccine  Comments:  Would recommend shingles vaccine.        COVID 19 Instructions    Jinnyronny Oneil was advised to limit contact with others to essential tasks such as getting food,  medications, and medical care.    Proper handwashing reviewed, and Hand sanitzer when washing is not available.    If the patient develops symptoms of COVID 19, the patient should call the office as soon as possible.    It is strongly recommended that Flu Vaccinations be obtained.      Virtual Visits:  Yue: This works on smart phones (any phone with Internet browsing capability).  You should get a text message when the provider is ready to see you.  Click on the link in the text message, and the call should start.  You will need to type in your name, and allow camera and microphone access.  This is HIPPA compliant, and secure.      If you have not already done so, get immunized to COVID 19.      We are committed to getting you vaccinated as soon as possible and will be closely following CDC and [facility] guidelines as they are released and revised.  Please refer to our COVID-19 vaccine webpage for the most up to date information on the vaccine and our distribution efforts.    This site will also have the most up to date recommendations for COVID booster vaccine.    [email or web address]    Call [phone] ([phone]), option 7    You can also visit https://www.vaccines.gov/ to find vaccines in your area.    OUR LOCATION:    [facility]  [address] [place] [address], [address]  [place], [address] [phone]  Fax: [phone]    Lab services, Rheumatology, and OB/GYN are at this location as well.    Thank you for your inquiry about the RSV vaccine.  As you can see below, the CDC has recommended that you discuss this with your Primary Care provider and decide if the vaccine is right for you.  This discussion can be accomplished at your next office visit.  The vaccine is currently available at your local pharmacy if you choose to get it prior to your next office visit.     Respiratory syncytial (sin[facility]) virus, or RSV, is a common respiratory virus  that usually causes mild, cold-like symptoms. Most people recover in a week or two, but RSV can be serious. Infants and older adults are more likely to develop severe RSV and need hospitalization. Vaccines are available to protect older adults from severe RSV. Monoclonal antibody products are available to protect infants and young children from severe RSV.    CDC Recommendations  Adults 60 years old and over  Adults 60 years of age and older may receive a single dose of RSV vaccine using shared clinical decision-making.    Infants and young children  1 dose of nirsevimab for all infants younger than 8 months born during or entering their first RSV season.  1 dose of nirsevimab for infants and children 8-19 months old who are at increased risk for severe RSV disease and entering their second RSV season.  Note: A different monoclonal antibody, palivizumab, is limited to children under 24 months of age with certain conditions that place them at high risk for severe RSV disease. It must be given once a month during RSV season. Please see AAP guidelines for palivizumab.    Pregnant people  1 dose of maternal RSV vaccine during weeks 32 through 36 of pregnancy, administered immediately before or during RSV season. Abrysvo is the only RSV vaccine recommended during pregnancy.    If you have any questions about RSV or the products above, talk to your healthcare provider.

## 2024-01-10 NOTE — ASSESSMENT & PLAN NOTE
Patient did have an ultrasound done previously.  Ordered for 2022, but she could wait for 2 years, which would mean this year.  Order still valid.  She can contact central scheduling.

## 2024-01-10 NOTE — ASSESSMENT & PLAN NOTE
Patient to follow with GI.  No specific changes.  She is on a modified low FODMAP diet.  She has identified several things that seem to make problems worse, and avoiding those.

## 2024-03-18 DIAGNOSIS — E78.2 MIXED HYPERLIPIDEMIA: ICD-10-CM

## 2024-03-18 RX ORDER — ATORVASTATIN CALCIUM 10 MG/1
TABLET, FILM COATED ORAL
Qty: 30 TABLET | Refills: 0 | Status: SHIPPED | OUTPATIENT
Start: 2024-03-18

## 2024-04-01 DIAGNOSIS — E78.2 MIXED HYPERLIPIDEMIA: ICD-10-CM

## 2024-04-02 RX ORDER — ATORVASTATIN CALCIUM 10 MG/1
TABLET, FILM COATED ORAL
Qty: 90 TABLET | Refills: 1 | Status: SHIPPED | OUTPATIENT
Start: 2024-04-02

## 2024-04-04 ENCOUNTER — OFFICE VISIT (OUTPATIENT)
Dept: URGENT CARE | Facility: MEDICAL CENTER | Age: 69
End: 2024-04-04
Payer: MEDICARE

## 2024-04-04 VITALS
RESPIRATION RATE: 20 BRPM | HEIGHT: 67 IN | SYSTOLIC BLOOD PRESSURE: 125 MMHG | OXYGEN SATURATION: 99 % | HEART RATE: 54 BPM | BODY MASS INDEX: 23.23 KG/M2 | TEMPERATURE: 98.4 F | DIASTOLIC BLOOD PRESSURE: 65 MMHG | WEIGHT: 148 LBS

## 2024-04-04 DIAGNOSIS — Z23 ENCOUNTER FOR IMMUNIZATION: ICD-10-CM

## 2024-04-04 DIAGNOSIS — S61.012A LACERATION OF SKIN OF LEFT THUMB, INITIAL ENCOUNTER: Primary | ICD-10-CM

## 2024-04-04 PROCEDURE — G0463 HOSPITAL OUTPT CLINIC VISIT: HCPCS | Performed by: ORTHOPAEDIC SURGERY

## 2024-04-04 PROCEDURE — 90715 TDAP VACCINE 7 YRS/> IM: CPT

## 2024-04-04 PROCEDURE — 99213 OFFICE O/P EST LOW 20 MIN: CPT | Performed by: ORTHOPAEDIC SURGERY

## 2024-04-04 PROCEDURE — 90471 IMMUNIZATION ADMIN: CPT | Performed by: ORTHOPAEDIC SURGERY

## 2024-04-04 NOTE — PROGRESS NOTES
[facility]        NAME: Jinny Oneil is a 68 y.o. female  : 1955    MRN: [id number]  DATE: 2024  TIME: 11:37 AM    Assessment and Plan   Laceration of skin of left thumb, initial encounter [S61.012A]  1. Laceration of skin of left thumb, initial encounter  Tdap Vaccine greater than or equal to 6yo      2. Encounter for immunization  Tdap Vaccine greater than or equal to 6yo        Wound superficial, no active bleeding. Band-aid applied. Patient instructed to keep wound clean, dry, and covered. Tdap updated today.     Patient Instructions     Keep wound clean, dry, and covered  Follow up with PCP in 3-5 days.  Proceed to  ER if symptoms worsen.    If tests are performed, our office will contact you with results only if changes need to made to the care plan discussed with you at the visit. You can review your full results on [facility].    Chief Complaint     Chief Complaint   Patient presents with    Finger Laceration     Pt states she cut her left thumb with a knife last night - concerned about it getting infected.  States she cleansed the wound with alcohol and applied neosporin.         History of Present Illness       68-year-old female presents the urgent care for evaluation of the left thumb laceration.  Patient states last night she was cutting with this in her kitchen when the knife slipped and sliced the tip of her left thumb.  The patient notes that she did clean it thoroughly and applied Neosporin and a Band-Aid.  She denies any uncontrollable bleeding.  She denies any numbness or tingling.  She denies any loss of range of motion.  Patient notes that her last tetanus was over 5 years ago.        Review of Systems   Review of Systems   Constitutional:  Negative for chills and fever.   HENT:  Negative for ear pain and sore throat.    Eyes:  Negative for pain and visual disturbance.   Respiratory:  Negative for cough and shortness of breath.    Cardiovascular:  Negative  for chest pain and palpitations.   Gastrointestinal:  Negative for abdominal pain and vomiting.   Genitourinary:  Negative for dysuria and hematuria.   Musculoskeletal:  Negative for arthralgias and back pain.   Skin:  Positive for wound. Negative for color change and rash.   Neurological:  Negative for seizures and syncope.   All other systems reviewed and are negative.        Current Medications       Current Outpatient Medications:     atorvastatin (LIPITOR) 10 mg tablet, TAKE 1 TABLET BY MOUTH EVERY DAY, Disp: 90 tablet, Rfl: 1    cholecalciferol (VITAMIN D3) 1,000 units tablet, Take 1 tablet (1,000 Units total) by mouth daily, Disp: 30 tablet, Rfl: 11    escitalopram (LEXAPRO) 10 mg tablet, TAKE 1 TABLET BY MOUTH EVERY DAY, Disp: 90 tablet, Rfl: 1    estradiol (ESTRACE) 0.1 mg/g vaginal cream, Insert 2 g into the vagina, Disp: , Rfl:     Multiple Vitamins-Minerals (multivitamin with minerals) tablet, Take 1 tablet by mouth daily, Disp: , Rfl:     Sodium Chloride-Xylitol (Xlear Sinus Care Spray) SOLN, into each nostril, Disp: , Rfl:     Zinc Sulfate (ZINC 15 PO), Take by mouth, Disp: , Rfl:     hyoscyamine (LEVSIN/SL) 0.125 mg SL tablet, Take 1 tablet (0.125 mg total) by mouth every 4 (four) hours as needed for cramping (Patient not taking: Reported on 4/4/2024), Disp: 30 tablet, Rfl: 5    polyethylene glycol (MIRALAX) 17 g packet, Take 17 g by mouth daily As needed for constipation (Patient not taking: Reported on 4/4/2024), Disp: 100 each, Rfl: 2    Current Allergies     Allergies as of 04/04/2024 - Reviewed 04/04/2024   Allergen Reaction Noted    Alcohol - food allergy Confusion and Delirium 04/19/2019    Ibuprofen Diarrhea 01/10/2024    Pear - food allergy Diarrhea 01/10/2024    Pravastatin Other (See Comments) 10/04/2019    Singulair [montelukast] Other (See Comments) 06/30/2022    Zithromax [azithromycin] Diarrhea and Rash 10/08/2019            The following portions of the patient's history were reviewed  "and updated as appropriate: allergies, current medications, past family history, past medical history, past social history, past surgical history and problem list.     Past Medical History:   Diagnosis Date    Anxiety     Colon polyp     COVID-19 virus infection 12/08/2021    Symptoms began 12/29.     Depression     Diverticulosis     Hyperlipidemia     Mild protein-calorie malnutrition (HCC) 03/13/2023    Rib fracture     Uncomplicated alcohol abuse        Past Surgical History:   Procedure Laterality Date    BREAST BIOPSY Right     benign    COLONOSCOPY      INCISIONAL BREAST BIOPSY         Family History   Problem Relation Age of Onset    Breast cancer Mother 65    Alcohol abuse Father     Coronary artery disease Father     No Known Problems Sister     No Known Problems Maternal Grandmother     No Known Problems Maternal Grandfather     No Known Problems Paternal Grandmother     No Known Problems Paternal Grandfather     Alcohol abuse Brother     Bipolar disorder Brother     Alcohol abuse Brother     No Known Problems Maternal Aunt     No Known Problems Maternal Aunt     Cancer Maternal Aunt     No Known Problems Paternal Aunt     No Known Problems Paternal Aunt          Medications have been verified.        Objective   /65   Pulse (!) 54   Temp 98.4 °F (36.9 °C) (Tympanic)   Resp 20   Ht 5' 7\" (1.702 m)   Wt 67.1 kg (148 lb)   SpO2 99%   BMI 23.18 kg/m²        Physical Exam     Physical Exam  Vitals and nursing note reviewed.   Constitutional:       General: She is not in acute distress.     Appearance: Normal appearance. She is not ill-appearing.   HENT:      Head: Normocephalic and atraumatic.      Nose: Nose normal.      Mouth/Throat:      Mouth: Mucous membranes are moist.      Pharynx: Oropharynx is clear.   Eyes:      Extraocular Movements: Extraocular movements intact.      Pupils: Pupils are equal, round, and reactive to light.   Cardiovascular:      Rate and Rhythm: Normal rate and regular " rhythm.      Pulses: Normal pulses.   Pulmonary:      Effort: Pulmonary effort is normal. No respiratory distress.   Musculoskeletal:      Cervical back: Normal range of motion.   Skin:     General: Skin is warm and dry.      Capillary Refill: Capillary refill takes less than 2 seconds.      Comments: Along the most distal tip of the left thumb there is a very small less than half a centimeter in length superficial laceration.  No bleeding, drainage, erythema.  Patient has full active range of motion of the IP and MCP joint without concern.  Neurovascularly intact   Neurological:      General: No focal deficit present.      Mental Status: She is alert and oriented to person, place, and time.   Psychiatric:         Mood and Affect: Mood normal.         Behavior: Behavior normal.

## 2024-05-24 ENCOUNTER — OFFICE VISIT (OUTPATIENT)
Dept: FAMILY MEDICINE CLINIC | Facility: CLINIC | Age: 69
End: 2024-05-24
Payer: MEDICARE

## 2024-05-24 VITALS
BODY MASS INDEX: 23.39 KG/M2 | DIASTOLIC BLOOD PRESSURE: 60 MMHG | WEIGHT: 149 LBS | TEMPERATURE: 97.5 F | SYSTOLIC BLOOD PRESSURE: 98 MMHG | HEIGHT: 67 IN | HEART RATE: 68 BPM

## 2024-05-24 DIAGNOSIS — S40.862A INSECT BITE OF LEFT UPPER ARM, INITIAL ENCOUNTER: Primary | ICD-10-CM
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
## 2024-05-24 DIAGNOSIS — K21.9 GASTROESOPHAGEAL REFLUX DISEASE, UNSPECIFIED WHETHER ESOPHAGITIS PRESENT: ICD-10-CM

## 2024-05-24 PROCEDURE — 99214 OFFICE O/P EST MOD 30 MIN: CPT | Performed by: NURSE PRACTITIONER

## 2024-05-24 PROCEDURE — G2211 COMPLEX E/M VISIT ADD ON: HCPCS | Performed by: NURSE PRACTITIONER

## 2024-05-24 NOTE — ASSESSMENT & PLAN NOTE
No signs of cellulitis were present at this time.  Patient is unsure if she was bit by a tick so I will order a Lyme level.  Patient was advised to have the blood work completed in 2 weeks to make sure that the results are accurate.  In the meantime, the patient was advised to use cortisone or Benadryl cream on the affected areas to help with pruritus and irritation.

## 2024-05-24 NOTE — PROGRESS NOTES
Ambulatory Visit  Name: Jinny Oneil      : 1955      MRN: [id number]  Encounter Provider: BRIAN Guthrie  Encounter Date: 2024   Encounter department: [facility]    Assessment & Plan   1. Insect bite of left upper arm, initial encounter  Assessment & Plan:  No signs of cellulitis were present at this time.  Patient is unsure if she was bit by a tick so I will order a Lyme level.  Patient was advised to have the blood work completed in 2 weeks to make sure that the results are accurate.  In the meantime, the patient was advised to use cortisone or Benadryl cream on the affected areas to help with pruritus and irritation.  Orders:  -     Lyme Total AB W Reflex to IGM/IGG; Future  2. Gastroesophageal reflux disease, unspecified whether esophagitis present  Assessment & Plan:  This is currently diet controlled.         History of Present Illness   {Disappearing Hyperlinks I Encounters * My Last Note * Since Last Visit * History :29907}  Insect bite of LUE: Patient reports that yesterday she noted an insect bite on her left upper arm.  She reports that the area does have associated pruritus and some surrounding redness.  She denies any drainage, swelling, or warmth to touch in the affected area.  Patient is unsure which type of insect bit her.    GERD: Patient reports that this is currently diet controlled.        Review of Systems   Constitutional:  Negative for chills and fever.   HENT:  Negative for ear pain and sore throat.    Eyes:  Negative for pain and visual disturbance.   Respiratory:  Negative for cough, chest tightness, shortness of breath and wheezing.    Cardiovascular:  Negative for chest pain, palpitations and leg swelling.   Gastrointestinal:  Negative for abdominal pain, constipation, diarrhea, nausea and vomiting.   Endocrine: Negative for cold intolerance and heat intolerance.   Genitourinary:  Negative for decreased urine volume, dysuria and hematuria.  "  Musculoskeletal:  Negative for arthralgias and back pain.   Skin:  Positive for color change (redness-LUE). Negative for rash.   Allergic/Immunologic: Negative for environmental allergies.   Neurological:  Negative for dizziness, seizures, syncope, weakness, light-headedness, numbness and headaches.   Hematological:  Negative for adenopathy.   Psychiatric/Behavioral:  Negative for confusion. The patient is not nervous/anxious.    All other systems reviewed and are negative.      Objective   {Disappearing Hyperlinks   Review Vitals * Enter New Vitals * Results Review * Labs * Imaging * Cardiology * Procedures * Lung Cancer Screening :83067}  BP 98/60   Pulse 68   Temp 97.5 °F (36.4 °C)   Ht 5' 7\" (1.702 m)   Wt 67.6 kg (149 lb)   BMI 23.34 kg/m²     Physical Exam  Vitals and nursing note reviewed.   Constitutional:       General: She is not in acute distress.     Appearance: Normal appearance. She is well-developed. She is not ill-appearing.   HENT:      Head: Normocephalic.   Eyes:      Conjunctiva/sclera: Conjunctivae normal.   Cardiovascular:      Rate and Rhythm: Normal rate and regular rhythm.      Pulses: Normal pulses.           Carotid pulses are 2+ on the right side and 2+ on the left side.       Radial pulses are 2+ on the right side and 2+ on the left side.        Posterior tibial pulses are 2+ on the right side and 2+ on the left side.      Heart sounds: Normal heart sounds. No murmur heard.  Pulmonary:      Effort: Pulmonary effort is normal. No respiratory distress.      Breath sounds: Normal breath sounds. No decreased breath sounds, wheezing, rhonchi or rales.   Abdominal:      General: Abdomen is flat. Bowel sounds are normal. There is no distension.      Palpations: Abdomen is soft.      Tenderness: There is no abdominal tenderness. There is no guarding.   Musculoskeletal:         General: No swelling. Normal range of motion.      Cervical back: Normal range of motion and neck supple.      " Right lower leg: No edema.      Left lower leg: No edema.   Skin:     General: Skin is warm and dry.      Capillary Refill: Capillary refill takes less than 2 seconds.             Comments: Small open area noted on the left upper arm where insect bite occurred.  A small area of surrounding redness was also present.  No edema, swelling, drainage, or warmth to touch was noted in the affected area.   Neurological:      General: No focal deficit present.      Mental Status: She is alert and oriented to person, place, and time.   Psychiatric:         Mood and Affect: Mood normal.         Behavior: Behavior normal.         Thought Content: Thought content normal.         Judgment: Judgment normal.       Administrative Statements {Disappearing Hyperlinks I  [id number]}

## 2024-06-07 ENCOUNTER — TELEPHONE (OUTPATIENT)
Dept: PSYCHIATRY | Facility: CLINIC | Age: 69
End: 2024-06-07

## 2024-06-07 NOTE — TELEPHONE ENCOUNTER
IC contacted patient returning patient call. Pt is interested in talk therapy in [place] with female provider. Pt has been added to proper wait list.

## 2024-06-28 ENCOUNTER — OFFICE VISIT (OUTPATIENT)
Dept: FAMILY MEDICINE CLINIC | Facility: CLINIC | Age: 69
End: 2024-06-28
Payer: MEDICARE

## 2024-06-28 VITALS
HEART RATE: 64 BPM | DIASTOLIC BLOOD PRESSURE: 72 MMHG | OXYGEN SATURATION: 97 % | BODY MASS INDEX: 23.7 KG/M2 | SYSTOLIC BLOOD PRESSURE: 112 MMHG | WEIGHT: 151 LBS | HEIGHT: 67 IN | TEMPERATURE: 97.4 F

## 2024-06-28 DIAGNOSIS — B36.9 FUNGAL DERMATITIS: Primary | ICD-10-CM

## 2024-06-28 DIAGNOSIS — Z00.00 MEDICARE ANNUAL WELLNESS VISIT, SUBSEQUENT: ICD-10-CM

## 2024-06-28 DIAGNOSIS — Z12.31 ENCOUNTER FOR SCREENING MAMMOGRAM FOR MALIGNANT NEOPLASM OF BREAST: ICD-10-CM

## 2024-06-28 PROCEDURE — 99213 OFFICE O/P EST LOW 20 MIN: CPT | Performed by: NURSE PRACTITIONER

## 2024-06-28 PROCEDURE — G0439 PPPS, SUBSEQ VISIT: HCPCS | Performed by: NURSE PRACTITIONER

## 2024-06-28 RX ORDER — NYSTATIN AND TRIAMCINOLONE ACETONIDE 100000; 1 [USP'U]/G; MG/G
OINTMENT TOPICAL 2 TIMES DAILY
Qty: 60 G | Refills: 1 | Status: SHIPPED | OUTPATIENT
Start: 2024-06-28

## 2024-06-28 RX ORDER — CYCLOSPORINE 0.5 MG/ML
EMULSION OPHTHALMIC
COMMUNITY
Start: 2024-06-24

## 2024-06-28 NOTE — ASSESSMENT & PLAN NOTE
Will trial nystatin/triamcinolone cream  She is also welcomed to continue using tea tree oil as well   Advised patient may take 4-6 weeks to completely resolve. If worse return to office

## 2024-06-28 NOTE — PATIENT INSTRUCTIONS
Medicare Preventive Visit Patient Instructions  Thank you for completing your Welcome to Medicare Visit or Medicare Annual Wellness Visit today. Your next wellness visit will be due in one year (6/29/2025).  The screening/preventive services that you may require over the next 5-10 years are detailed below. Some tests may not apply to you based off risk factors and/or age. Screening tests ordered at today's visit but not completed yet may show as past due. Also, please note that scanned in results may not display below.  Preventive Screenings:  Service Recommendations Previous Testing/Comments   Colorectal Cancer Screening  * Colonoscopy    * Fecal Occult Blood Test (FOBT)/Fecal Immunochemical Test (FIT)  * Fecal DNA/Cologuard Test  * Flexible Sigmoidoscopy Age: 45-75 years old   Colonoscopy: every 10 years (may be performed more frequently if at higher risk)  OR  FOBT/FIT: every 1 year  OR  Cologuard: every 3 years  OR  Sigmoidoscopy: every 5 years  Screening may be recommended earlier than age 45 if at higher risk for colorectal cancer. Also, an individualized decision between you and your healthcare provider will decide whether screening between the ages of 76-85 would be appropriate. Colonoscopy: 02/22/2023  FOBT/FIT: Not on file  Cologuard: Not on file  Sigmoidoscopy: Not on file          Breast Cancer Screening Age: 40+ years old  Frequency: every 1-2 years  Not required if history of left and right mastectomy Mammogram: 08/09/2022        Cervical Cancer Screening Between the ages of 21-29, pap smear recommended once every 3 years.   Between the ages of 30-65, can perform pap smear with HPV co-testing every 5 years.   Recommendations may differ for women with a history of total hysterectomy, cervical cancer, or abnormal pap smears in past. Pap Smear: Not on file        Hepatitis C Screening Once for adults born between 1945 and 1965  More frequently in patients at high risk for Hepatitis C Hep C Antibody: Not  on file        Diabetes Screening 1-2 times per year if you're at risk for diabetes or have pre-diabetes Fasting glucose: 95 mg/dL (12/28/2023)  A1C: No results in last 5 years (No results in last 5 years)      Cholesterol Screening Once every 5 years if you don't have a lipid disorder. May order more often based on risk factors. Lipid panel: 03/07/2023          Other Preventive Screenings Covered by Medicare:  Abdominal Aortic Aneurysm (AAA) Screening: covered once if your at risk. You're considered to be at risk if you have a family history of AAA.  Lung Cancer Screening: covers low dose CT scan once per year if you meet all of the following conditions: (1) Age 55-77; (2) No signs or symptoms of lung cancer; (3) Current smoker or have quit smoking within the last 15 years; (4) You have a tobacco smoking history of at least 20 pack years (packs per day multiplied by number of years you smoked); (5) You get a written order from a healthcare provider.  Glaucoma Screening: covered annually if you're considered high risk: (1) You have diabetes OR (2) Family history of glaucoma OR (3)  aged 50 and older OR (4)  American aged 65 and older  Osteoporosis Screening: covered every 2 years if you meet one of the following conditions: (1) You're estrogen deficient and at risk for osteoporosis based off medical history and other findings; (2) Have a vertebral abnormality; (3) On glucocorticoid therapy for more than 3 months; (4) Have primary hyperparathyroidism; (5) On osteoporosis medications and need to assess response to drug therapy.   Last bone density test (DXA Scan): 03/03/2021.  HIV Screening: covered annually if you're between the age of 15-65. Also covered annually if you are younger than 15 and older than 65 with risk factors for HIV infection. For pregnant patients, it is covered up to 3 times per pregnancy.    Immunizations:  Immunization Recommendations   Influenza Vaccine Annual influenza  vaccination during flu season is recommended for all persons aged >= 6 months who do not have contraindications   Pneumococcal Vaccine   * Pneumococcal conjugate vaccine = PCV13 (Prevnar 13), PCV15 (Vaxneuvance), PCV20 (Prevnar 20)  * Pneumococcal polysaccharide vaccine = PPSV23 (Pneumovax) Adults 19-63 yo with certain risk factors or if 65+ yo  If never received any pneumonia vaccine: recommend Prevnar 20 (PCV20)  Give PCV20 if previously received 1 dose of PCV13 or PPSV23   Hepatitis B Vaccine 3 dose series if at intermediate or high risk (ex: diabetes, end stage renal disease, liver disease)   Respiratory syncytial virus (RSV) Vaccine - COVERED BY MEDICARE PART D  * RSVPreF3 (Arexvy) CDC recommends that adults 60 years of age and older may receive a single dose of RSV vaccine using shared clinical decision-making (SCDM)   Tetanus (Td) Vaccine - COST NOT COVERED BY MEDICARE PART B Following completion of primary series, a booster dose should be given every 10 years to maintain immunity against tetanus. Td may also be given as tetanus wound prophylaxis.   Tdap Vaccine - COST NOT COVERED BY MEDICARE PART B Recommended at least once for all adults. For pregnant patients, recommended with each pregnancy.   Shingles Vaccine (Shingrix) - COST NOT COVERED BY MEDICARE PART B  2 shot series recommended in those 19 years and older who have or will have weakened immune systems or those 50 years and older     Health Maintenance Due:      Topic Date Due    Hepatitis C Screening  Never done    DXA SCAN  03/02/2023    Breast Cancer Screening: Mammogram  08/09/2023    Colorectal Cancer Screening  02/21/2028     Immunizations Due:      Topic Date Due    Pneumococcal Vaccine: 65+ Years (2 of 2 - PCV) 09/24/2019    COVID-19 Vaccine (1 - 2023-24 season) Never done    Influenza Vaccine (Season Ended) 09/01/2024     Advance Directives   What are advance directives?  Advance directives are legal documents that state your wishes and  plans for medical care. These plans are made ahead of time in case you lose your ability to make decisions for yourself. Advance directives can apply to any medical decision, such as the treatments you want, and if you want to donate organs.   What are the types of advance directives?  There are many types of advance directives, and each state has rules about how to use them. You may choose a combination of any of the following:  Living will:  This is a written record of the treatment you want. You can also choose which treatments you do not want, which to limit, and which to stop at a certain time. This includes surgery, medicine, IV fluid, and tube feedings.   Durable power of  for healthcare (DPAHC):  This is a written record that states who you want to make healthcare choices for you when you are unable to make them for yourself. This person, called a proxy, is usually a family member or a friend. You may choose more than 1 proxy.  Do not resuscitate (DNR) order:  A DNR order is used in case your heart stops beating or you stop breathing. It is a request not to have certain forms of treatment, such as CPR. A DNR order may be included in other types of advance directives.  Medical directive:  This covers the care that you want if you are in a coma, near death, or unable to make decisions for yourself. You can list the treatments you want for each condition. Treatment may include pain medicine, surgery, blood transfusions, dialysis, IV or tube feedings, and a ventilator (breathing machine).  Values history:  This document has questions about your views, beliefs, and how you feel and think about life. This information can help others choose the care that you would choose.  Why are advance directives important?  An advance directive helps you control your care. Although spoken wishes may be used, it is better to have your wishes written down. Spoken wishes can be misunderstood, or not followed. Treatments  may be given even if you do not want them. An advance directive may make it easier for your family to make difficult choices about your care.       © Copyright [employer] 2018 Information is for End User's use only and may not be sold, redistributed or otherwise used for commercial purposes. All illustrations and images included in CareNotes® are the copyrighted property of [employer] or [employer]

## 2024-06-28 NOTE — PROGRESS NOTES
Ambulatory Visit  Name: Jinny Oneil      : 1955      MRN: [id number]  Encounter Provider: BRIAN Naidu  Encounter Date: 2024   Encounter department: [facility] PRIMARY CARE    Assessment & Plan   1. Fungal dermatitis  Assessment & Plan:  Will trial nystatin/triamcinolone cream  She is also welcomed to continue using tea tree oil as well   Advised patient may take 4-6 weeks to completely resolve. If worse return to office  Orders:  -     nystatin-triamcinolone (MYCOLOG-II) ointment; Apply topically 2 (two) times a day  2. Encounter for screening mammogram for malignant neoplasm of breast  -     Mammo screening bilateral w 3d & cad; Future  3. Medicare annual wellness visit, subsequent       Preventive health issues were discussed with patient, and age appropriate screening tests were ordered as noted in patient's After Visit Summary. Personalized health advice and appropriate referrals for health education or preventive services given if needed, as noted in patient's After Visit Summary.    History of Present Illness   {Disappearing Hyperlinks I Encounters * My Last Note * Since Last Visit * History :15833}  Patient has rash last week that dried up and resolved on its own but then she started it again a few days ago   She used tea tree and neosporin but wants to have the rash looked at  The rash itches very slightly     Rash       Patient Care Team:  Shahid Serrano MD as PCP - General (Family Medicine)  Junie Chisholm DO    Review of Systems   Skin:  Positive for rash.     Medical History Reviewed by provider this encounter:       Annual Wellness Visit Questionnaire   Jinny is here for her Subsequent Wellness visit.     Health Risk Assessment:   Patient rates overall health as very good. Patient feels that their physical health rating is same. Patient is very satisfied with their life. Eyesight was rated as same. Hearing was rated as slightly worse. Patient feels  that their emotional and mental health rating is same. Patients states they are never, rarely angry. Patient states they are sometimes unusually tired/fatigued. Pain experienced in the last 7 days has been some. Patient states that she has experienced no weight loss or gain in last 6 months. Mild back pain sees chiropractor     Fall Risk Screening:   In the past year, patient has experienced: no history of falling in past year      Urinary Incontinence Screening:   Patient has not leaked urine accidently in the last six months.     Home Safety:  Patient does not have trouble with stairs inside or outside of their home. Patient has working smoke alarms and has working carbon monoxide detector. Home safety hazards include: none.     Nutrition:   Current diet is Other (please comment). FODMAP    Medications:   Patient is currently taking over-the-counter supplements. OTC medications include: see medication list. Patient is able to manage medications.     Activities of Daily Living (ADLs)/Instrumental Activities of Daily Living (IADLs):   Walk and transfer into and out of bed and chair?: Yes  Dress and groom yourself?: Yes    Bathe or shower yourself?: Yes    Feed yourself? Yes  Do your laundry/housekeeping?: Yes  Manage your money, pay your bills and track your expenses?: Yes  Make your own meals?: Yes    Do your own shopping?: Yes    Previous Hospitalizations:   Any hospitalizations or ED visits within the last 12 months?: No      Advance Care Planning:   Living will: Yes    Advanced directive: Yes      PREVENTIVE SCREENINGS      Cardiovascular Screening:    General: History Lipid Disorder and Screening Current      Diabetes Screening:     General: Screening Current      Colorectal Cancer Screening:     General: Screening Current      Breast Cancer Screening:     General: Screening Current      Cervical Cancer Screening:    General: Screening Not Indicated      Osteoporosis Screening:    General: Risks and Benefits  Discussed    Due for: DXA Axial      Abdominal Aortic Aneurysm (AAA) Screening:        General: Screening Not Indicated      Lung Cancer Screening:     General: Screening Not Indicated      Hepatitis C Screening:    General: Screening Not Indicated    Screening, Brief Intervention, and Referral to Treatment (SBIRT)    Screening      AUDIT-C Screenin) How often did you have a drink containing alcohol in the past year? never  2) How many drinks did you have on a typical day when you were drinking in the past year? 0  3) How often did you have 6 or more drinks on one occasion in the past year? never    AUDIT-C Score: 0  Interpretation: Score 0-2 (female): Negative screen for alcohol misuse    Single Item Drug Screening:  How often have you used an illegal drug (including marijuana) or a prescription medication for non-medical reasons in the past year? never    Single Item Drug Screen Score: 0  Interpretation: Negative screen for possible drug use disorder    Brief Intervention  Alcohol & drug use screenings were reviewed. No concerns regarding substance use disorder identified.     Other Counseling Topics:   Regular weightbearing exercise and calcium and vitamin D intake.     Social Determinants of Health     Financial Resource Strain: Low Risk  (4/3/2023)    Overall Financial Resource Strain (CARDIA)    • Difficulty of Paying Living Expenses: Not hard at all   Food Insecurity: No Food Insecurity (2024)    Hunger Vital Sign    • Worried About Running Out of Food in the Last Year: Never true    • Ran Out of Food in the Last Year: Never true   Transportation Needs: No Transportation Needs (2024)    PRAPARE - Transportation    • Lack of Transportation (Medical): No    • Lack of Transportation (Non-Medical): No   Housing Stability: Low Risk  (2024)    Housing Stability Vital Sign    • Unable to Pay for Housing in the Last Year: No    • Number of Times Moved in the Last Year: 1    • Homeless in the Last  "Year: No   Utilities: Not At Risk (6/28/2024)    [facility] Utilities    • Threatened with loss of utilities: No     No results found.    Objective   {Disappearing Hyperlinks   Review Vitals * Enter New Vitals * Results Review * Labs * Imaging * Cardiology * Procedures * Lung Cancer Screening :18921}  /72 (BP Location: Left arm, Patient Position: Sitting, Cuff Size: Standard)   Pulse 64   Temp (!) 97.4 °F (36.3 °C) (Tympanic)   Ht 5' 7\" (1.702 m)   Wt 68.5 kg (151 lb)   SpO2 97%   BMI 23.65 kg/m²     Physical Exam  Vitals and nursing note reviewed.   Constitutional:       General: She is not in acute distress.     Appearance: Normal appearance. She is not ill-appearing or diaphoretic.   HENT:      Head: Normocephalic and atraumatic.      Right Ear: External ear normal.      Left Ear: External ear normal.   Eyes:      Extraocular Movements: Extraocular movements intact.      Conjunctiva/sclera: Conjunctivae normal.   Cardiovascular:      Rate and Rhythm: Normal rate and regular rhythm.      Heart sounds: Normal heart sounds, S1 normal and S2 normal. No murmur heard.  Pulmonary:      Effort: Pulmonary effort is normal.      Breath sounds: Normal breath sounds.   Skin:     Coloration: Skin is not pale.      Comments: Single satellite lesion on the chest that are circular raised and red    Neurological:      Mental Status: She is alert and oriented to person, place, and time.   Psychiatric:         Mood and Affect: Mood normal.         Behavior: Behavior normal.         Thought Content: Thought content normal.         Judgment: Judgment normal.       Administrative Statements {Disappearing Hyperlinks I  Level of Service * [facility]/PCSP:15500}              "

## 2024-07-09 ENCOUNTER — TELEPHONE (OUTPATIENT)
Age: 69
End: 2024-07-09

## 2024-07-09 ENCOUNTER — OFFICE VISIT (OUTPATIENT)
Dept: FAMILY MEDICINE CLINIC | Facility: CLINIC | Age: 69
End: 2024-07-09
Payer: MEDICARE

## 2024-07-09 VITALS
HEIGHT: 67 IN | OXYGEN SATURATION: 98 % | WEIGHT: 151 LBS | HEART RATE: 67 BPM | DIASTOLIC BLOOD PRESSURE: 70 MMHG | SYSTOLIC BLOOD PRESSURE: 110 MMHG | BODY MASS INDEX: 23.7 KG/M2

## 2024-07-09 DIAGNOSIS — L30.1 DYSHIDROTIC ECZEMA: Primary | ICD-10-CM

## 2024-07-09 PROCEDURE — G2211 COMPLEX E/M VISIT ADD ON: HCPCS | Performed by: NURSE PRACTITIONER

## 2024-07-09 PROCEDURE — 99213 OFFICE O/P EST LOW 20 MIN: CPT | Performed by: NURSE PRACTITIONER

## 2024-07-09 RX ORDER — BETAMETHASONE DIPROPIONATE 0.5 MG/G
CREAM TOPICAL 2 TIMES DAILY
Qty: 30 G | Refills: 0 | Status: SHIPPED | OUTPATIENT
Start: 2024-07-09

## 2024-07-09 NOTE — LETTER
To whom it may concern,     She needs to carry with her with her carryon a prescription of betamethasone augmented 0.05% for her current rash condition that is not contagious.     If you have any questions please do not hesitate to call the office     Shereen TORRES

## 2024-07-09 NOTE — TELEPHONE ENCOUNTER
Patient called stating that while she was at appointment today she pointed out a rash on her face to Shereen Zamora and she I would like to know if the rash on her face is eczema like what she has between her fingers or if it is something else. She is also asking if she should use the Diprolene-AF cream on her face as well.     Please follow up with patient to advise.

## 2024-07-09 NOTE — ASSESSMENT & PLAN NOTE
Patient rash between her fingers is consistent with this condition. Recommend cold compress and betamethasone

## 2024-07-09 NOTE — PROGRESS NOTES
"Ambulatory Visit  Name: Jinny Oneil      : 1955      MRN: [id number]  Encounter Provider: BRIAN Naidu  Encounter Date: 2024   Encounter department: [facility] PRIMARY CARE    Assessment & Plan   1. Dyshidrotic eczema  Assessment & Plan:  Patient rash between her fingers is consistent with this condition. Recommend cold compress and betamethasone   Orders:  -     betamethasone, augmented, (DIPROLENE-AF) 0.05 % cream; Apply topically 2 (two) times a day       History of Present Illness   {Disappearing Hyperlinks I Encounters * My Last Note * Since Last Visit * History :05513}  She was using cream prescribed for several day and the rash settled then she back off on using it as much  She started with little bumps in between her fingers- she has been trying to use ivy wash, prescribed cream, aveeno, calamine lotion, bleach   The rash is very itchy         Review of Systems   Skin:  Positive for rash.       Objective   {Disappearing Hyperlinks   Review Vitals * Enter New Vitals * Results Review * Labs * Imaging * Cardiology * Procedures * Lung Cancer Screening :45510}  /70 (BP Location: Left arm, Patient Position: Sitting, Cuff Size: Standard)   Pulse 67   Ht 5' 7\" (1.702 m)   Wt 68.5 kg (151 lb)   SpO2 98%   BMI 23.65 kg/m²     Physical Exam  Vitals and nursing note reviewed.   Constitutional:       General: She is not in acute distress.     Appearance: Normal appearance. She is normal weight. She is not ill-appearing or diaphoretic.   HENT:      Head: Normocephalic and atraumatic.      Right Ear: External ear normal.      Left Ear: External ear normal.   Eyes:      Extraocular Movements: Extraocular movements intact.      Conjunctiva/sclera: Conjunctivae normal.   Cardiovascular:      Rate and Rhythm: Normal rate and regular rhythm.      Heart sounds: Normal heart sounds, S1 normal and S2 normal. No murmur heard.  Pulmonary:      Effort: Pulmonary effort is normal.      " Breath sounds: Normal breath sounds.   Skin:     Coloration: Skin is not pale.      Findings: Rash present. Rash is papular (lesions tiny between bilateral fingers).   Neurological:      Mental Status: She is alert and oriented to person, place, and time.   Psychiatric:         Mood and Affect: Mood normal.         Behavior: Behavior normal.         Thought Content: Thought content normal.         Judgment: Judgment normal.       Administrative Statements {Disappearing Hyperlinks I  Level of Service * [facility]}

## 2024-07-10 NOTE — TELEPHONE ENCOUNTER
Rash on face not the same as rash on fingers.   Can only use hydrocortisone (over the counter) to the face and moisturize twice per day

## 2024-07-10 NOTE — TELEPHONE ENCOUNTER
Spoke with patient to clarify that hydrocortisone eczema relief cream she has at home is ok for face. Not to use betamethasone on her face but can still use this for rash between her fingers

## 2024-08-02 ENCOUNTER — RA CDI HCC (OUTPATIENT)
Dept: OTHER | Facility: HOSPITAL | Age: 69
End: 2024-08-02

## 2024-08-06 ENCOUNTER — TELEPHONE (OUTPATIENT)
Age: 69
End: 2024-08-06

## 2024-08-06 NOTE — TELEPHONE ENCOUNTER
Patient called in regarding the bone density test. Advised that it was . She will get a new referral when she comes in on 24

## 2024-08-07 ENCOUNTER — TELEPHONE (OUTPATIENT)
Age: 69
End: 2024-08-07

## 2024-08-07 ENCOUNTER — APPOINTMENT (OUTPATIENT)
Dept: LAB | Facility: MEDICAL CENTER | Age: 69
End: 2024-08-07
Payer: MEDICARE

## 2024-08-07 DIAGNOSIS — E55.9 VITAMIN D DEFICIENCY: ICD-10-CM

## 2024-08-07 DIAGNOSIS — E78.2 MIXED HYPERLIPIDEMIA: ICD-10-CM

## 2024-08-07 DIAGNOSIS — Z13.820 OSTEOPOROSIS SCREENING: ICD-10-CM

## 2024-08-07 LAB
25(OH)D3 SERPL-MCNC: 60.6 NG/ML (ref 30–100)
ALBUMIN SERPL BCG-MCNC: 4.2 G/DL (ref 3.5–5)
ALP SERPL-CCNC: 45 U/L (ref 34–104)
ALT SERPL W P-5'-P-CCNC: 19 U/L (ref 7–52)
ANION GAP SERPL CALCULATED.3IONS-SCNC: 9 MMOL/L (ref 4–13)
AST SERPL W P-5'-P-CCNC: 24 U/L (ref 13–39)
BILIRUB SERPL-MCNC: 0.65 MG/DL (ref 0.2–1)
BUN SERPL-MCNC: 14 MG/DL (ref 5–25)
CALCIUM SERPL-MCNC: 9.1 MG/DL (ref 8.4–10.2)
CHLORIDE SERPL-SCNC: 102 MMOL/L (ref 96–108)
CHOLEST SERPL-MCNC: 188 MG/DL
CO2 SERPL-SCNC: 29 MMOL/L (ref 21–32)
CREAT SERPL-MCNC: 0.61 MG/DL (ref 0.6–1.3)
GFR SERPL CREATININE-BSD FRML MDRD: 91 ML/MIN/1.73SQ M
GLUCOSE P FAST SERPL-MCNC: 75 MG/DL (ref 65–99)
HDLC SERPL-MCNC: 56 MG/DL
LDLC SERPL DIRECT ASSAY-MCNC: 140 MG/DL (ref 0–100)
POTASSIUM SERPL-SCNC: 4.6 MMOL/L (ref 3.5–5.3)
PROT SERPL-MCNC: 7.2 G/DL (ref 6.4–8.4)
SODIUM SERPL-SCNC: 140 MMOL/L (ref 135–147)

## 2024-08-07 PROCEDURE — 36415 COLL VENOUS BLD VENIPUNCTURE: CPT

## 2024-08-07 PROCEDURE — 83718 ASSAY OF LIPOPROTEIN: CPT

## 2024-08-07 PROCEDURE — 82465 ASSAY BLD/SERUM CHOLESTEROL: CPT

## 2024-08-07 PROCEDURE — 83721 ASSAY OF BLOOD LIPOPROTEIN: CPT

## 2024-08-07 PROCEDURE — 82306 VITAMIN D 25 HYDROXY: CPT

## 2024-08-07 PROCEDURE — 80053 COMPREHEN METABOLIC PANEL: CPT

## 2024-08-07 NOTE — TELEPHONE ENCOUNTER
Pt called in to confirm what she is allowed to have while fasting for bloodwork. Confirmed with clinical that water, black coffee and black tea is acceptable to drink. Relayed this to pt.

## 2024-08-21 ENCOUNTER — TELEPHONE (OUTPATIENT)
Dept: ADMINISTRATIVE | Facility: OTHER | Age: 69
End: 2024-08-21

## 2024-08-21 NOTE — TELEPHONE ENCOUNTER
08/21/24 2:43 PM    Patient contacted to bring Advance Directive, POLST, or Living Will document to next scheduled pcp visit.[facility] Department spoke with patient/ caregiver.    Thank you.  Michael Vital MA  [facility]

## 2024-08-23 ENCOUNTER — OFFICE VISIT (OUTPATIENT)
Dept: FAMILY MEDICINE CLINIC | Facility: CLINIC | Age: 69
End: 2024-08-23
Payer: MEDICARE

## 2024-08-23 VITALS
HEART RATE: 70 BPM | OXYGEN SATURATION: 93 % | HEIGHT: 67 IN | WEIGHT: 151.8 LBS | DIASTOLIC BLOOD PRESSURE: 62 MMHG | SYSTOLIC BLOOD PRESSURE: 100 MMHG | RESPIRATION RATE: 14 BRPM | TEMPERATURE: 98.4 F | BODY MASS INDEX: 23.83 KG/M2

## 2024-08-23 DIAGNOSIS — L30.1 DYSHIDROTIC ECZEMA: ICD-10-CM

## 2024-08-23 DIAGNOSIS — F31.9 BIPOLAR 1 DISORDER (HCC): ICD-10-CM

## 2024-08-23 DIAGNOSIS — E55.9 VITAMIN D DEFICIENCY: ICD-10-CM

## 2024-08-23 DIAGNOSIS — E78.2 MIXED HYPERLIPIDEMIA: Primary | ICD-10-CM

## 2024-08-23 DIAGNOSIS — K21.9 GASTROESOPHAGEAL REFLUX DISEASE, UNSPECIFIED WHETHER ESOPHAGITIS PRESENT: ICD-10-CM

## 2024-08-23 PROCEDURE — G2211 COMPLEX E/M VISIT ADD ON: HCPCS | Performed by: FAMILY MEDICINE

## 2024-08-23 PROCEDURE — 99214 OFFICE O/P EST MOD 30 MIN: CPT | Performed by: FAMILY MEDICINE

## 2024-08-23 RX ORDER — ESCITALOPRAM OXALATE 10 MG/1
10 TABLET ORAL DAILY
Qty: 90 TABLET | Refills: 1 | Status: SHIPPED | OUTPATIENT
Start: 2024-08-23

## 2024-08-23 RX ORDER — ATORVASTATIN CALCIUM 10 MG/1
10 TABLET, FILM COATED ORAL DAILY
Qty: 90 TABLET | Refills: 1 | Status: SHIPPED | OUTPATIENT
Start: 2024-08-23

## 2024-08-23 NOTE — PROGRESS NOTES
Ambulatory Visit  Name: Jinny Oneil      : 1955      MRN: [id number]  Encounter Provider: Shahid Serrano MD  Encounter Date: 2024   Encounter department: [facility] PRIMARY CARE    Assessment & Plan   1. Mixed hyperlipidemia  Assessment & Plan:  Recommend restart Lipitor and check in 6 months.    Orders:  -     Comprehensive metabolic panel; Future; Expected date: 2025  -     Lipid panel; Future; Expected date: 2025  -     atorvastatin (LIPITOR) 10 mg tablet; Take 1 tablet (10 mg total) by mouth daily  2. Vitamin D deficiency  Assessment & Plan:  Vitamin D level was fantastic.  Recheck in 6 months.  Orders:  -     Vitamin D 25 hydroxy; Future; Expected date: 2025  3. Dyshidrotic eczema  Assessment & Plan:  Improved with Tea Tree Oils. No changes.  4. Gastroesophageal reflux disease, unspecified whether esophagitis present  Assessment & Plan:  Minimal problems right now.  Follow as scheduled later. Call if increase in problems.    5. Bipolar 1 disorder (HCC)  Assessment & Plan:  Stable.    Recommend continuing with Lexapro.  Orders:  -     escitalopram (LEXAPRO) 10 mg tablet; Take 1 tablet (10 mg total) by mouth daily     Chief Complaint   Patient presents with   • Follow-up     Patient in office for 6 months follow up       History of Present Illness     Patient is here to follow-up on multiple issues.    Hyperlipidemia: She did have laboratory studies done recently.  Currently has Lipitor 10 mg.  She has not been taking the medication, however.    Vitamin D deficiency: Noted before.  Has vitamin D supplement daily.    Eczema: Patient did use betamethasone, but noted that he treat oil seem to work a bit better for her.    GERD: No specific issues right now. Doing well.            Review of Systems   Constitutional:  Negative for appetite change and fever.   Respiratory:  Negative for chest tightness and shortness of breath.    Cardiovascular:  Negative for chest  "pain, palpitations and leg swelling.   Gastrointestinal:  Negative for abdominal pain.   Genitourinary:  Negative for difficulty urinating.   Musculoskeletal:  Negative for arthralgias and myalgias.   Skin:  Negative for wound.   Neurological:  Negative for dizziness, light-headedness and headaches.   Psychiatric/Behavioral:  Negative for dysphoric mood and sleep disturbance. The patient is not nervous/anxious.        Objective     /62 (BP Location: Left arm, Patient Position: Sitting, Cuff Size: Adult)   Pulse 70   Temp 98.4 °F (36.9 °C) (Temporal)   Resp 14   Ht 5' 7\" (1.702 m)   Wt 68.9 kg (151 lb 12.8 oz)   SpO2 93%   BMI 23.78 kg/m²     Total cholesterol 188, , HDL 56.  Sodium 140, potassium 4.6, calcium 9.1.  Blood sugar 75.  Creatinine 0.61, GFR 91.  AST 24, ALT 19.  Vitamin D: 60.6.    Physical Exam  Vitals and nursing note reviewed.   Constitutional:       Appearance: Normal appearance. She is well-developed.   HENT:      Head: Normocephalic and atraumatic.   Cardiovascular:      Rate and Rhythm: Normal rate and regular rhythm.      Pulses:           Carotid pulses are 2+ on the right side and 2+ on the left side.     Heart sounds: Normal heart sounds.   Pulmonary:      Effort: Pulmonary effort is normal.      Breath sounds: Normal breath sounds. No wheezing or rales.   Chest:      Chest wall: No tenderness.   Neurological:      Mental Status: She is alert.       Administrative Statements           "

## 2024-08-23 NOTE — PATIENT INSTRUCTIONS
1. Mixed hyperlipidemia  Assessment & Plan:  Recommend restart Lipitor and check in 6 months.    Orders:  -     Comprehensive metabolic panel; Future; Expected date: 02/23/2025  -     Lipid panel; Future; Expected date: 02/23/2025  -     atorvastatin (LIPITOR) 10 mg tablet; Take 1 tablet (10 mg total) by mouth daily  2. Vitamin D deficiency  Assessment & Plan:  Vitamin D level was fantastic.  Recheck in 6 months.  Orders:  -     Vitamin D 25 hydroxy; Future; Expected date: 02/23/2025  3. Dyshidrotic eczema  Assessment & Plan:  Improved with Tea Tree Oils. No changes.  4. Gastroesophageal reflux disease, unspecified whether esophagitis present  Assessment & Plan:  Minimal problems right now.  Follow as scheduled later. Call if increase in problems.    5. Bipolar 1 disorder (HCC)  Assessment & Plan:  Stable.    Recommend continuing with Lexapro.  Orders:  -     escitalopram (LEXAPRO) 10 mg tablet; Take 1 tablet (10 mg total) by mouth daily      COVID 19 Instructions    Jinny Oneil was advised to limit contact with others to essential tasks such as getting food, medications, and medical care.    Proper handwashing reviewed, and Hand sanitzer when washing is not available.    If the patient develops symptoms of COVID 19, the patient should call the office as soon as possible.    It is strongly recommended that Flu Vaccinations be obtained.      Virtual Visits:  Yue: This works on smart phones (any phone with Internet browsing capability).  You should get a text message when the provider is ready to see you.  Click on the link in the text message, and the call should start.  You will need to type in your name, and allow camera and microphone access.  This is HIPPA compliant, and secure.      If you have not already done so, get immunized to COVID 19.      We are committed to getting you vaccinated as soon as possible and will be closely following CDC and [facility] guidelines as they are released and  revised.  Please refer to our COVID-19 vaccine webpage for the most up to date information on the vaccine and our distribution efforts.    This site will also have the most up to date recommendations for COVID booster vaccine.    [email or web address]    Call [phone] ([phone]), option 7    You can also visit https://www.vaccines.gov/ to find vaccines in your area.    OUR LOCATION:    [address], [address]  [place], [place], [address] [phone]  Fax: [phone]    Lab services, Rheumatology, and OB/GYN are at this location as well.

## 2024-08-27 ENCOUNTER — TELEPHONE (OUTPATIENT)
Age: 69
End: 2024-08-27

## 2024-08-27 DIAGNOSIS — G62.9 NEUROPATHY: ICD-10-CM

## 2024-08-27 DIAGNOSIS — M20.20 ACQUIRED HALLUX RIGIDUS, UNSPECIFIED LATERALITY: Primary | ICD-10-CM

## 2024-08-27 NOTE — TELEPHONE ENCOUNTER
Patient reports she is still experiencing pain in her bilateral feet that is worse in her left foot. She would like a call back to advise if she can have a referral to podiatry and if there is a specific podiatrist her PCP recommends.

## 2024-10-04 ENCOUNTER — HOSPITAL ENCOUNTER (OUTPATIENT)
Dept: MAMMOGRAPHY | Facility: MEDICAL CENTER | Age: 69
Discharge: HOME | End: 2024-10-04
Payer: MEDICARE

## 2024-10-04 VITALS — BODY MASS INDEX: 23.7 KG/M2 | WEIGHT: 151 LBS | HEIGHT: 67 IN

## 2024-10-04 DIAGNOSIS — Z12.31 ENCOUNTER FOR SCREENING MAMMOGRAM FOR MALIGNANT NEOPLASM OF BREAST: ICD-10-CM

## 2024-10-04 PROCEDURE — 77067 SCR MAMMO BI INCL CAD: CPT

## 2024-10-04 PROCEDURE — 77063 BREAST TOMOSYNTHESIS BI: CPT

## 2024-10-16 ENCOUNTER — OFFICE VISIT (OUTPATIENT)
Dept: PODIATRY | Facility: CLINIC | Age: 69
End: 2024-10-16
Payer: MEDICARE

## 2024-10-16 VITALS — RESPIRATION RATE: 18 BRPM | HEIGHT: 67 IN | WEIGHT: 152 LBS | BODY MASS INDEX: 23.86 KG/M2

## 2024-10-16 DIAGNOSIS — G62.9 PERIPHERAL NERVE DISORDER: ICD-10-CM

## 2024-10-16 DIAGNOSIS — M20.42 HAMMER TOES OF BOTH FEET: ICD-10-CM

## 2024-10-16 DIAGNOSIS — M20.11 VALGUS DEFORMITY OF BOTH GREAT TOES: Primary | ICD-10-CM

## 2024-10-16 DIAGNOSIS — M20.41 HAMMER TOES OF BOTH FEET: ICD-10-CM

## 2024-10-16 DIAGNOSIS — M20.12 VALGUS DEFORMITY OF BOTH GREAT TOES: Primary | ICD-10-CM

## 2024-10-16 DIAGNOSIS — L84 CALLUS: ICD-10-CM

## 2024-10-16 PROCEDURE — 99204 OFFICE O/P NEW MOD 45 MIN: CPT | Performed by: PODIATRIST

## 2024-10-16 NOTE — PROGRESS NOTES
Ambulatory Visit  Name: Jinny Oneil      : 1955      MRN: [id number]  Encounter Provider: Scott Lopez DPM  Encounter Date: 10/16/2024   Encounter department: [facility] PODIATRY [facility]    Explained to patient that she has multiple foot deformities including bunions of both feet, a mallet toe deformity of the left second toe, tailor's bunion left foot and prominent fifth metatarsal base left foot with callus.    Patient prefers to avoid surgical intervention.  Trimmed hyperkeratotic lesion fifth metatarsal base left foot.  If pain persists, Voltaren gel recommended.  Cortisone injection also treatment option.    Dispensed toe crest to elevate second toe left foot.  Should pain persist and warrant, tenotomy of the flexor tendon could be performed.    In regards to the numbness, patient states that she has numbness in toes and fingers.  On questioning, she states that she abused alcohol years back.  This may be the cause of the numbness.  No treatment advised at this time.    No treatment advised for nail dystrophy.  Reappoint 2 months to assess.    Assessment & Plan  Valgus deformity of both great toes         Hammer toes of both feet         Callus         Peripheral nerve disorder           History of Present Illness     Jinny Oneil is a 69 y.o. female who presents with multiple pedal disorders.  Patient notes that she has bunion deformities both feet along with a tailor's bunion of the left foot.  She also relates pain at the fifth metatarsal base left foot where a bone enlargement appears.  A callus is also present there.    Patient also has pain at the tip of each second toe with left more painful than the right.  The tip of the left second toe is very red.    Patient also relates numbness in all lesser toes.    She also relates thickening of all toenails.    Patient was seen by podiatrist approximately 10 years ago who recommended surgical intervention.  Patient is not interested in  "surgery at this time at least for her bunions.      Review of Systems   Gastrointestinal:         IBS   Musculoskeletal:  Positive for arthralgias and gait problem.   Neurological:  Positive for numbness.   Psychiatric/Behavioral:          Bipolar type I               Objective     Resp 18   Ht 5' 7\" (1.702 m)   Wt 64.4 kg (142 lb)   BMI 22.24 kg/m²     Physical Exam  Constitutional:       Appearance: Normal appearance.   Cardiovascular:      Pulses: Normal pulses.   Musculoskeletal:         General: Deformity present.      Comments: Hallux valgus deformity bilateral.  Prominent fifth metatarsal base left foot.  Tailor's bunion left foot.  Mallet toe deformity left second toe.   Skin:     Findings: Lesion present.      Comments: Hyperkeratotic lesion base fifth metatarsal left foot.   Neurological:      General: No focal deficit present.      Mental Status: She is oriented to person, place, and time.      Sensory: Sensory deficit present.             "

## 2024-10-22 NOTE — PROGRESS NOTES
PT Evaluation     Today's date: 2019  Patient name: Mark Leone  : 1955  MRN: [id number]  Referring provider: Kenzie Bach DPM  Dx:   Encounter Diagnosis     ICD-10-CM    1  Sprain of right ankle, unspecified ligament, initial encounter S93 [id number]        Start Time: 08  Stop Time: 09  Total time in clinic (min): 36 minutes    Assessment  Assessment details: Mark Leone is a 75 y o  female presenting to PT with pain, decreased ankle range of motion, decreased strength, balance deficits, and decreased tolerance to activity s/p inversion sprain 6 weeks ago  Patient would benefit from skilled PT services to address these impairments and to maximize function in order to improve quality of life  Thank you for the referral   Impairments: abnormal or restricted ROM, activity intolerance, impaired physical strength, lacks appropriate home exercise program and pain with function  Understanding of Dx/Px/POC: good   Prognosis: good    Goals  STG  1) R ankle ROM will improve 50% in 3 weeks  2) R ankle strength will improve 1/2 grade in 3 weeks  3) Standing tolerance will improve 50% in 3 weeks  LTG  1) Patient is independent in HEP  2) Patient will ascend/descend one flight of stairs independently with no increased pain in 6 weeks  3) Ambulation will be improved to PLOF in 6 weeks  4) Balance will improve to WNL in 6 weeks  5) R ankle AROM and strength will be WNL in 6 weeks      Plan  Patient would benefit from: skilled physical therapy  Planned modality interventions: cryotherapy  Planned therapy interventions: balance/weight bearing training, home exercise program, therapeutic exercise, therapeutic activities, stretching, strengthening, patient education, neuromuscular re-education, manual therapy, joint mobilization, abdominal trunk stabilization, body mechanics training, muscle pump exercises, postural training, functional ROM exercises, flexibility and gait training  Frequency: 2x no edema,  no murmurs,  regular rate and rhythm week  Duration in weeks: 6  Treatment plan discussed with: patient        Subjective Evaluation    History of Present Illness  Date of onset: 10/27/2019  Mechanism of injury: Patient presents with R ankle pain after an inversion sprain about 5-6 weeks ago  She was on vacation in [facility], it was a rainy day and she slid while walking down some stairs  She got up and kept walking the rest of the day, and it started to swell by the end of the day  She was using ice packs later that day and night  When she returned home, she was given a CAM walker boot by podiatrist for which she has been wearing up until yesterday  She presents today wearing a sneaker, but says she still wears it when she needs to walk a lot or it feels more sore  Quality of life: good    Pain  Current pain rating: 3  At best pain ratin  At worst pain ratin  Quality: dull ache and discomfort  Relieving factors: rest  Aggravating factors: walking, stair climbing and running (taking care of great-grandchildren)  Progression: improved      Diagnostic Tests  X-ray: normal  MRI studies: normal  Treatments  Previous treatment: immobilization  Patient Goals  Patient goals for therapy: improved balance, increased motion and increased strength  Patient goal: be able to do normal activities without increased pain        Objective     Observations     Right Ankle/Foot   Positive for edema  Tenderness     Right Ankle/Foot   Tenderness in the anterior talofibular ligament and lateral malleolus  No tenderness in the calcaneofibular ligament  Neurological Testing     Additional Neurological Details  Sensation intact  Normal LE neuro screen      Active Range of Motion     Right Ankle/Foot   Dorsiflexion (ke): 0 degrees   Plantar flexion: 32 degrees   Inversion: 30 degrees   Eversion: 22 degrees with pain    Strength/Myotome Testing     Right Hip   Planes of Motion   Flexion: 4  Extension: 4  Abduction: 4-    Isolated Muscles   Gluteus maximums: 4  Gluteus medius: 4-    Right Knee   Flexion: 4  Extension: 4    Right Ankle/Foot   Dorsiflexion: 4  Plantar flexion: 4  Inversion: 4-  Eversion: 4-    Ambulation     Ambulation: Stairs   Ascend stairs: independent  Pattern: reciprocal  Railings: one rail  Descend stairs: independent  Pattern: non-reciprocal  Railings: one rail               Precautions: hyperlipidemia    Daily Treatment Diary     Manuals             R ankle PROM                                                    Exercise Diary              Bike             Circles (cw/ccw)             4 way ankle TB             Calf strap stretch                          HR/TR             Rocker board shifts             Prostretch             Soleus stretch                                                                                                                                                                                      Modalities             CP

## 2024-10-30 ENCOUNTER — APPOINTMENT (EMERGENCY)
Dept: RADIOLOGY | Facility: HOSPITAL | Age: 69
End: 2024-10-30
Payer: MEDICARE

## 2024-10-30 ENCOUNTER — HOSPITAL ENCOUNTER (OUTPATIENT)
Facility: HOSPITAL | Age: 69
Setting detail: OBSERVATION
Discharge: HOME | End: 2024-10-31
Attending: EMERGENCY MEDICINE
Payer: MEDICARE

## 2024-10-30 DIAGNOSIS — I42.9 CARDIOMYOPATHY (HCC): ICD-10-CM

## 2024-10-30 DIAGNOSIS — R07.9 CHEST PAIN: Primary | ICD-10-CM

## 2024-10-30 DIAGNOSIS — R07.89 ATYPICAL CHEST PAIN: ICD-10-CM

## 2024-10-30 DIAGNOSIS — R79.89 ELEVATED TROPONIN: ICD-10-CM

## 2024-10-30 PROBLEM — R94.39 ABNORMAL STRESS TEST: Status: ACTIVE | Noted: 2020-04-22

## 2024-10-30 PROBLEM — E83.42 HYPOMAGNESEMIA: Status: ACTIVE | Noted: 2024-10-30

## 2024-10-30 PROBLEM — I45.10 RBBB: Status: ACTIVE | Noted: 2024-10-30

## 2024-10-30 LAB
2HR DELTA HS TROPONIN: -39 NG/L
4HR DELTA HS TROPONIN: 7 NG/L
ALBUMIN SERPL BCG-MCNC: 3.6 G/DL (ref 3.5–5)
ALP SERPL-CCNC: 35 U/L (ref 34–104)
ALT SERPL W P-5'-P-CCNC: 13 U/L (ref 7–52)
ANION GAP SERPL CALCULATED.3IONS-SCNC: 6 MMOL/L (ref 4–13)
APTT PPP: 36 SECONDS (ref 23–34)
AST SERPL W P-5'-P-CCNC: 19 U/L (ref 13–39)
ATRIAL RATE: 54 BPM
ATRIAL RATE: 56 BPM
BASOPHILS # BLD AUTO: 0.04 THOUSANDS/ΜL (ref 0–0.1)
BASOPHILS NFR BLD AUTO: 1 % (ref 0–1)
BILIRUB SERPL-MCNC: 0.49 MG/DL (ref 0.2–1)
BNP SERPL-MCNC: 133 PG/ML (ref 0–100)
BUN SERPL-MCNC: 12 MG/DL (ref 5–25)
CALCIUM SERPL-MCNC: 7.5 MG/DL (ref 8.4–10.2)
CARDIAC TROPONIN I PNL SERPL HS: 38 NG/L
CARDIAC TROPONIN I PNL SERPL HS: 77 NG/L
CARDIAC TROPONIN I PNL SERPL HS: 84 NG/L
CHLORIDE SERPL-SCNC: 110 MMOL/L (ref 96–108)
CO2 SERPL-SCNC: 24 MMOL/L (ref 21–32)
CREAT SERPL-MCNC: 0.45 MG/DL (ref 0.6–1.3)
EOSINOPHIL # BLD AUTO: 0.07 THOUSAND/ΜL (ref 0–0.61)
EOSINOPHIL NFR BLD AUTO: 1 % (ref 0–6)
ERYTHROCYTE [DISTWIDTH] IN BLOOD BY AUTOMATED COUNT: 11.3 % (ref 11.6–15.1)
GFR SERPL CREATININE-BSD FRML MDRD: 101 ML/MIN/1.73SQ M
GLUCOSE SERPL-MCNC: 82 MG/DL (ref 65–140)
HCT VFR BLD AUTO: 42.5 % (ref 34.8–46.1)
HGB BLD-MCNC: 14.1 G/DL (ref 11.5–15.4)
IMM GRANULOCYTES # BLD AUTO: 0.01 THOUSAND/UL (ref 0–0.2)
IMM GRANULOCYTES NFR BLD AUTO: 0 % (ref 0–2)
INR PPP: 1.06 (ref 0.85–1.19)
LYMPHOCYTES # BLD AUTO: 2.13 THOUSANDS/ΜL (ref 0.6–4.47)
LYMPHOCYTES NFR BLD AUTO: 31 % (ref 14–44)
MAGNESIUM SERPL-MCNC: 1.6 MG/DL (ref 1.9–2.7)
MCH RBC QN AUTO: 32.4 PG (ref 26.8–34.3)
MCHC RBC AUTO-ENTMCNC: 33.2 G/DL (ref 31.4–37.4)
MCV RBC AUTO: 98 FL (ref 82–98)
MONOCYTES # BLD AUTO: 0.68 THOUSAND/ΜL (ref 0.17–1.22)
MONOCYTES NFR BLD AUTO: 10 % (ref 4–12)
NEUTROPHILS # BLD AUTO: 4.06 THOUSANDS/ΜL (ref 1.85–7.62)
NEUTS SEG NFR BLD AUTO: 57 % (ref 43–75)
NRBC BLD AUTO-RTO: 0 /100 WBCS
P AXIS: 61 DEGREES
P AXIS: 68 DEGREES
PLATELET # BLD AUTO: 249 THOUSANDS/UL (ref 149–390)
PMV BLD AUTO: 9.4 FL (ref 8.9–12.7)
POTASSIUM SERPL-SCNC: 3.5 MMOL/L (ref 3.5–5.3)
PR INTERVAL: 142 MS
PR INTERVAL: 152 MS
PROT SERPL-MCNC: 5.7 G/DL (ref 6.4–8.4)
PROTHROMBIN TIME: 14 SECONDS (ref 12.3–15)
QRS AXIS: 56 DEGREES
QRS AXIS: 65 DEGREES
QRSD INTERVAL: 128 MS
QRSD INTERVAL: 130 MS
QT INTERVAL: 480 MS
QT INTERVAL: 484 MS
QTC INTERVAL: 458 MS
QTC INTERVAL: 463 MS
RBC # BLD AUTO: 4.35 MILLION/UL (ref 3.81–5.12)
SODIUM SERPL-SCNC: 140 MMOL/L (ref 135–147)
T WAVE AXIS: 54 DEGREES
T WAVE AXIS: 58 DEGREES
VENTRICULAR RATE: 54 BPM
VENTRICULAR RATE: 56 BPM
WBC # BLD AUTO: 6.99 THOUSAND/UL (ref 4.31–10.16)

## 2024-10-30 PROCEDURE — 93010 ELECTROCARDIOGRAM REPORT: CPT | Performed by: INTERNAL MEDICINE

## 2024-10-30 PROCEDURE — 99223 1ST HOSP IP/OBS HIGH 75: CPT | Performed by: STUDENT IN AN ORGANIZED HEALTH CARE EDUCATION/TRAINING PROGRAM

## 2024-10-30 PROCEDURE — 83880 ASSAY OF NATRIURETIC PEPTIDE: CPT | Performed by: EMERGENCY MEDICINE

## 2024-10-30 PROCEDURE — 93005 ELECTROCARDIOGRAM TRACING: CPT

## 2024-10-30 PROCEDURE — 99285 EMERGENCY DEPT VISIT HI MDM: CPT | Performed by: EMERGENCY MEDICINE

## 2024-10-30 PROCEDURE — 85730 THROMBOPLASTIN TIME PARTIAL: CPT | Performed by: EMERGENCY MEDICINE

## 2024-10-30 PROCEDURE — 71045 X-RAY EXAM CHEST 1 VIEW: CPT

## 2024-10-30 PROCEDURE — 83735 ASSAY OF MAGNESIUM: CPT | Performed by: EMERGENCY MEDICINE

## 2024-10-30 PROCEDURE — 84484 ASSAY OF TROPONIN QUANT: CPT | Performed by: EMERGENCY MEDICINE

## 2024-10-30 PROCEDURE — 80053 COMPREHEN METABOLIC PANEL: CPT | Performed by: EMERGENCY MEDICINE

## 2024-10-30 PROCEDURE — 85025 COMPLETE CBC W/AUTO DIFF WBC: CPT | Performed by: EMERGENCY MEDICINE

## 2024-10-30 PROCEDURE — 99285 EMERGENCY DEPT VISIT HI MDM: CPT

## 2024-10-30 PROCEDURE — 36415 COLL VENOUS BLD VENIPUNCTURE: CPT | Performed by: EMERGENCY MEDICINE

## 2024-10-30 PROCEDURE — 99204 OFFICE O/P NEW MOD 45 MIN: CPT | Performed by: INTERNAL MEDICINE

## 2024-10-30 PROCEDURE — 85610 PROTHROMBIN TIME: CPT | Performed by: EMERGENCY MEDICINE

## 2024-10-30 RX ORDER — ASPIRIN 325 MG
325 TABLET ORAL ONCE
Status: COMPLETED | OUTPATIENT
Start: 2024-10-30 | End: 2024-10-30

## 2024-10-30 RX ORDER — ACETAMINOPHEN 325 MG/1
650 TABLET ORAL EVERY 6 HOURS PRN
Status: DISCONTINUED | OUTPATIENT
Start: 2024-10-30 | End: 2024-10-31 | Stop reason: HOSPADM

## 2024-10-30 RX ORDER — ENOXAPARIN SODIUM 100 MG/ML
40 INJECTION SUBCUTANEOUS DAILY
Status: DISCONTINUED | OUTPATIENT
Start: 2024-10-30 | End: 2024-10-31 | Stop reason: HOSPADM

## 2024-10-30 RX ORDER — ESCITALOPRAM OXALATE 10 MG/1
10 TABLET ORAL DAILY
Status: DISCONTINUED | OUTPATIENT
Start: 2024-10-31 | End: 2024-10-31 | Stop reason: HOSPADM

## 2024-10-30 RX ORDER — ASPIRIN 81 MG/1
81 TABLET, CHEWABLE ORAL DAILY
Status: DISCONTINUED | OUTPATIENT
Start: 2024-10-31 | End: 2024-10-31 | Stop reason: HOSPADM

## 2024-10-30 RX ORDER — CALCIUM CARBONATE 500 MG/1
1000 TABLET, CHEWABLE ORAL 3 TIMES DAILY PRN
Status: DISCONTINUED | OUTPATIENT
Start: 2024-10-30 | End: 2024-10-31 | Stop reason: HOSPADM

## 2024-10-30 RX ORDER — NITROGLYCERIN 0.4 MG/1
0.4 TABLET SUBLINGUAL
Status: DISCONTINUED | OUTPATIENT
Start: 2024-10-30 | End: 2024-10-31 | Stop reason: HOSPADM

## 2024-10-30 RX ORDER — ONDANSETRON 2 MG/ML
4 INJECTION INTRAMUSCULAR; INTRAVENOUS EVERY 6 HOURS PRN
Status: DISCONTINUED | OUTPATIENT
Start: 2024-10-30 | End: 2024-10-31 | Stop reason: HOSPADM

## 2024-10-30 RX ORDER — MAGNESIUM SULFATE HEPTAHYDRATE 40 MG/ML
2 INJECTION, SOLUTION INTRAVENOUS ONCE
Status: COMPLETED | OUTPATIENT
Start: 2024-10-30 | End: 2024-10-31

## 2024-10-30 RX ORDER — ATORVASTATIN CALCIUM 10 MG/1
10 TABLET, FILM COATED ORAL
Status: DISCONTINUED | OUTPATIENT
Start: 2024-10-30 | End: 2024-10-31 | Stop reason: HOSPADM

## 2024-10-30 RX ADMIN — ENOXAPARIN SODIUM 40 MG: 100 INJECTION SUBCUTANEOUS at 16:27

## 2024-10-30 RX ADMIN — ASPIRIN 325 MG ORAL TABLET 325 MG: 325 PILL ORAL at 13:06

## 2024-10-30 RX ADMIN — MAGNESIUM SULFATE HEPTAHYDRATE 2 G: 40 INJECTION, SOLUTION INTRAVENOUS at 16:27

## 2024-10-30 RX ADMIN — ATORVASTATIN CALCIUM 10 MG: 10 TABLET, FILM COATED ORAL at 16:27

## 2024-10-30 NOTE — ED PROVIDER NOTES
Time reflects when diagnosis was documented in both MDM as applicable and the Disposition within this note       Time User Action Codes Description Comment    10/30/2024  2:00 PM Bendock, Etelvina L Add [R07.89] Atypical chest pain     10/30/2024  2:00 PM Bendock, Etelvina L Remove [R07.89] Atypical chest pain     10/30/2024  2:00 PM Bendock, Etelvina L Add [R07.9] Chest pain     10/30/2024  2:00 PM Bendock, Etelvina L Add [R79.89] Elevated troponin           ED Disposition       ED Disposition   Admit    Condition   Stable    Date/Time   Wed Oct 30, 2024  2:00 PM    Comment   Case was discussed with Dr Banerjee and the patient's admission status was agreed to be Admission Status: observation status to the service of Dr. Banerjee             Assessment & Plan       Medical Decision Making      Different diagnosis : ACS, NSTEMI, pleurisry, pneumothorax, costochondritis, pneumonia, GERD, anxiety, hepatitis, pancreatitis, aortic dissection, pericarditis, myocarditis, pericardial effusion, asthma exacerbation, COPD exacerbation, herpes zoster, peritoneal rupture, esophageal spasm.      History and physical concerning for arrhythmia versus STEMI versus NSTEMI versus GERD versus pericarditis versus musculoskeletal.  Did consider PE and pleurisy.      Amount and/or Complexity of Data Reviewed  Independent Historian: spouse     Details:      at bedside  External Data Reviewed: notes.     Details:     Patient did have a echo and stress test on April 22, 2020 with no abnormal ST changes.  Repeat echo in April 2021 showed EF of 60%    Labs: ordered. Decision-making details documented in ED Course.     Details:     No anemia, thrombocytopenia or leukocytosis  Troponin 77 with a heart score 7 with delta troponin of -39  Magnesium at 1.6.  Mildly elevated proBNP  No acute kidney injury or other electrolyte abnormality    Radiology: ordered and independent interpretation performed. Decision-making details documented in ED Course.      "Details:     Chest x-ray interpreted by myself as   No acute findings    ECG/medicine tests: ordered and independent interpretation performed. Decision-making details documented in ED Course.     Details:     No ischemia or arrhythmia    Risk  OTC drugs.  Decision regarding hospitalization.        ED Course as of 10/30/24 1716   Wed Oct 30, 2024   1257 Patient is a 69-year-old female coming in today with  at bedside after developing chest pain.  On exam patient is resting in bed in no acute distress.  Hemodynamically stable.  Will start cardiac workup.      Disclosure: Voice to text software was used in the preparation of this document and could have resulted in translational errors.      Occasional wrong word or \"sound a like\" substitutions may have occurred due to the inherent limitations of voice recognition software.  Read the chart carefully and recognize, using context, where substitutions have occurred.       I have independently reviewed external records are available to me to the level of detail possible within the time constraints of my patient care responsibilities in the ED.       1334 Patient's troponin 77.  In 2021 troponin 1 was 0.11.  Will reach out to cardiology and discussed with patient regarding admission       1410 Discussed with cardiology and given patient's history and elevated troponin they are agreeable with admission.  [facility] accepted patient.         1518 Patient was seen by medicine.  Planning admission.           Medications   magnesium sulfate 2 g/50 mL IVPB (premix) 2 g (has no administration in time range)   aspirin tablet 325 mg (325 mg Oral Given 10/30/24 1306)       ED Risk Strat Scores   HEART Risk Score      Flowsheet Row Most Recent Value   Heart Score Risk Calculator    History 1 Filed at: 10/30/2024 1336   ECG 1 Filed at: 10/30/2024 1336   Age 2 Filed at: 10/30/2024 1336   Risk Factors 1 Filed at: 10/30/2024 1336   Troponin 2 Filed at: 10/30/2024 1336   HEART Score 7 " Filed at: 10/30/2024 1336                               GRICEL Risk Score      Flowsheet Row Most Recent Value   Age >= 65 1 Filed at: 10/30/2024 1453   Known CAD (stenosis >= 50%) 0 Filed at: 10/30/2024 1453   Recent (<=24 hrs) Service Angina 0 Filed at: 10/30/2024 1453   ST Deviation >= 0.5 mm 0 Filed at: 10/30/2024 1453   3+ CAD Risk Factors (FHx, HTN, HLP, DM, Smoker) 0 Filed at: 10/30/2024 1453   Aspirin Use Past 7 Days 0 Filed at: 10/30/2024 1453   Elevated Cardiac Markers 1 Filed at: 10/30/2024 1453   GRICEL Risk Score (Calculated) 2 Filed at: 10/30/2024 1453                          History of Present Illness       Chief Complaint   Patient presents with    Chest Pain     Pt reports having CP while at the vet. Some dizziness after. Pt reports some tightness.        Past Medical History:   Diagnosis Date    Anxiety     Colon polyp     COVID-19 virus infection 2021    Symptoms began .     Depression     Diverticulosis     Hyperlipidemia     Mild protein-calorie malnutrition (HCC) 2023    Rib fracture     Uncomplicated alcohol abuse       Past Surgical History:   Procedure Laterality Date    BREAST BIOPSY Right     benign    COLONOSCOPY        Family History   Problem Relation Age of Onset    Breast cancer Mother 65    Alcohol abuse Father     Coronary artery disease Father     No Known Problems Sister     No Known Problems Maternal Grandmother     No Known Problems Maternal Grandfather     No Known Problems Paternal Grandmother     No Known Problems Paternal Grandfather     Alcohol abuse Brother     Bipolar disorder Brother     Alcohol abuse Brother     No Known Problems Maternal Aunt     No Known Problems Maternal Aunt     Cancer Maternal Aunt     No Known Problems Paternal Aunt     No Known Problems Paternal Aunt       Social History     Tobacco Use    Smoking status: Former     Current packs/day: 0.00     Types: Cigarettes     Quit date: 2002     Years since quittin.7    Smokeless  tobacco: Never   Vaping Use    Vaping status: Never Used   Substance Use Topics    Alcohol use: Not Currently    Drug use: No      E-Cigarette/Vaping    E-Cigarette Use Never User       E-Cigarette/Vaping Substances    Nicotine No     THC No     CBD No     Flavoring No       I have reviewed and agree with the history as documented.     Patient is a 69-year-old female with a history of hyperlipidemia here with spouse coming in today with chest pain.  Patient states that she was in her normal state of health until early this morning.  Patient was at the vet when she developed chest discomfort and pressure in the center of her chest that slightly moved to the left.  She had no associated diaphoresis, nausea, shortness of breath, palpitations or syncope.  She states this lasted about 20 to 30 minutes.  She went home and laid down but the pain persisted.  She reports that she thought maybe it was from something she ate however a several hours earlier.  She drank some water and Diet Coke without relief.  Now she has a dull ache.  She has not had similar symptoms in several years.  She has not been eating her atorvastatin as prescribed but takes it sometimes.  The pain does not radiate into the neck, jaw, arm or back.  She reports that her father passed away of an MI in his 70s.  She has had had prior testing a cardiac standpoint several years ago      History provided by:  Patient, medical records and spouse   used: No    Chest Pain  Pain location:  Substernal area and L chest  Pain quality: dull and pressure    Pain quality: not aching    Pain radiates to:  Does not radiate  Pain radiates to the back: no    Pain severity:  Mild  Onset quality:  Gradual  Timing:  Constant  Progression:  Partially resolved  Chronicity:  New  Context: at rest    Relieved by:  Nothing  Worsened by:  Nothing tried  Ineffective treatments:  None tried  Associated symptoms: no abdominal pain, no AICD problem, no altered  mental status, no anorexia, no anxiety, no back pain, no claudication, no cough, no diaphoresis, no dizziness, no dysphagia, no fatigue, no fever, no headache, no heartburn, no lower extremity edema, no nausea, no near-syncope, no numbness, no orthopnea, no palpitations, no PND, no shortness of breath, no syncope, not vomiting and no weakness    Risk factors: high cholesterol    Risk factors: no aortic disease, no birth control, no coronary artery disease, no diabetes mellitus, no hypertension, no immobilization, not male, no Marfan's syndrome, not obese, not pregnant, no prior DVT/PE, no smoking and no surgery        Review of Systems   Constitutional: Negative.  Negative for chills, diaphoresis, fatigue and fever.   HENT: Negative.  Negative for ear pain, sore throat and trouble swallowing.    Eyes:  Negative for pain and visual disturbance.   Respiratory: Negative.  Negative for cough and shortness of breath.    Cardiovascular:  Positive for chest pain. Negative for palpitations, orthopnea, claudication, syncope, PND and near-syncope.   Gastrointestinal: Negative.  Negative for abdominal pain, anorexia, heartburn, nausea and vomiting.   Genitourinary: Negative.  Negative for dysuria and hematuria.   Musculoskeletal: Negative.  Negative for arthralgias and back pain.   Skin:  Negative for color change and rash.   Neurological: Negative.  Negative for dizziness, seizures, syncope, weakness, numbness and headaches.   Hematological: Negative.    Psychiatric/Behavioral: Negative.     All other systems reviewed and are negative.          Objective       ED Triage Vitals   Temperature Pulse Blood Pressure Respirations SpO2 Patient Position - Orthostatic VS   10/30/24 1244 10/30/24 1244 10/30/24 1244 10/30/24 1410 10/30/24 1244 10/30/24 1244   97.5 °F (36.4 °C) (!) 54 162/77 18 100 % Lying      Temp Source Heart Rate Source BP Location FiO2 (%) Pain Score    10/30/24 1244 10/30/24 1410 10/30/24 1244 -- 10/30/24 1410     Oral Monitor Right arm  No Pain      Vitals      Date and Time Temp Pulse SpO2 Resp BP Pain Score FACES Pain Rating User   10/30/24 1535 -- -- -- -- -- No Pain -- MM   10/30/24 1529 -- -- -- -- -- No Pain -- MM   10/30/24 1529 -- 61 100 % 18 155/68 -- -- [place]   10/30/24 1430 -- 54 100 % 22 141/91 3 -- [place]   10/30/24 1410 -- 57 100 % 18 143/70 No Pain -- DS   10/30/24 1244 97.5 °F (36.4 °C) 54 100 % -- 162/77 -- -- CO            Physical Exam  Vitals and nursing note reviewed.   Constitutional:       General: She is awake. She is not in acute distress.     Appearance: Normal appearance. She is well-developed and normal weight.   HENT:      Head: Normocephalic and atraumatic.   Eyes:      General: Lids are normal. Gaze aligned appropriately.      Extraocular Movements: Extraocular movements intact.      Conjunctiva/sclera: Conjunctivae normal.      Pupils: Pupils are equal, round, and reactive to light.   Cardiovascular:      Rate and Rhythm: Normal rate and regular rhythm.      Pulses:           Radial pulses are 2+ on the right side and 2+ on the left side.        Dorsalis pedis pulses are 2+ on the right side and 2+ on the left side.      Heart sounds: Normal heart sounds, S1 normal and S2 normal. No murmur heard.  Pulmonary:      Effort: Pulmonary effort is normal. No respiratory distress.      Breath sounds: Normal breath sounds.   Abdominal:      Palpations: Abdomen is soft.      Tenderness: There is no abdominal tenderness.   Musculoskeletal:         General: No swelling.      Cervical back: Normal range of motion and neck supple.      Right lower leg: No edema.      Left lower leg: No edema.   Skin:     General: Skin is warm and dry.      Capillary Refill: Capillary refill takes less than 2 seconds.   Neurological:      General: No focal deficit present.      Mental Status: She is alert and oriented to person, place, and time.      GCS: GCS eye subscore is 4. GCS verbal subscore is 5. GCS motor subscore is 6.       Cranial Nerves: Cranial nerves 2-12 are intact.      Sensory: Sensation is intact.      Motor: Motor function is intact.      Coordination: Coordination is intact.   Psychiatric:         Mood and Affect: Mood normal.         Behavior: Behavior is cooperative.         Results Reviewed       Procedure Component Value Units Date/Time    HS Troponin I 2hr [[id number]]  (Normal) Collected: 10/30/24 1501    Lab Status: Final result Specimen: Blood from Arm, Left Updated: 10/30/24 1528     hs TnI 2hr 38 ng/L      Delta 2hr hsTnI -39 ng/L     HS Troponin I 4hr [[id number]]     Lab Status: No result Specimen: Blood     Comprehensive metabolic panel [[id number]]  (Abnormal) Collected: 10/30/24 1410    Lab Status: Final result Specimen: Blood from Arm, Left Updated: 10/30/24 1430     Sodium 140 mmol/L      Potassium 3.5 mmol/L      Chloride 110 mmol/L      CO2 24 mmol/L      ANION GAP 6 mmol/L      BUN 12 mg/dL      Creatinine 0.45 mg/dL      Glucose 82 mg/dL      Calcium 7.5 mg/dL      AST 19 U/L      ALT 13 U/L      Alkaline Phosphatase 35 U/L      Total Protein 5.7 g/dL      Albumin 3.6 g/dL      Total Bilirubin 0.49 mg/dL      eGFR 101 ml/min/1.73sq m     Narrative:      [employer] guidelines for Chronic Kidney Disease (CKD):     Stage 1 with normal or high GFR (GFR > 90 mL/min/1.73 square meters)    Stage 2 Mild CKD (GFR = 60-89 mL/min/1.73 square meters)    Stage 3A Moderate CKD (GFR = 45-59 mL/min/1.73 square meters)    Stage 3B Moderate CKD (GFR = 30-44 mL/min/1.73 square meters)    Stage 4 Severe CKD (GFR = 15-29 mL/min/1.73 square meters)    Stage 5 End Stage CKD (GFR <15 mL/min/1.73 square meters)  Note: GFR calculation is accurate only with a steady state creatinine    Magnesium [[id number]]  (Abnormal) Collected: 10/30/24 1410    Lab Status: Final result Specimen: Blood from Arm, Left Updated: 10/30/24 1430     Magnesium 1.6 mg/dL     B-Type Natriuretic Peptide(BNP) [[id number]]  (Abnormal)  Collected: 10/30/24 1252    Lab Status: Final result Specimen: Blood from Arm, Left Updated: 10/30/24 1335      pg/mL     HS Troponin 0hr (reflex protocol) [[id number]]  (Abnormal) Collected: 10/30/24 1252    Lab Status: Final result Specimen: Blood from Arm, Left Updated: 10/30/24 1325     hs TnI 0hr 77 ng/L     Protime-INR [[id number]]  (Normal) Collected: 10/30/24 1252    Lab Status: Final result Specimen: Blood from Arm, Left Updated: 10/30/24 1308     Protime 14.0 seconds      INR 1.06    Narrative:      INR Therapeutic Range    Indication                                             INR Range      Atrial Fibrillation                                               2.0-3.0  Hypercoagulable State                                    2.0.2.3  Left Ventricular Asist Device                            2.0-3.0  Mechanical Heart Valve                                  -    Aortic(with afib, MI, embolism, HF, LA enlargement,    and/or coagulopathy)                                     2.0-3.0 (2.5-3.5)     Mitral                                                             2.5-3.5  Prosthetic/Bioprosthetic Heart Valve               2.0-3.0  Venous thromboembolism (VTE: VT, PE        2.0-3.0    APTT [[id number]]  (Abnormal) Collected: 10/30/24 1252    Lab Status: Final result Specimen: Blood from Arm, Left Updated: 10/30/24 1308     PTT 36 seconds     CBC and differential [[id number]]  (Abnormal) Collected: 10/30/24 1252    Lab Status: Final result Specimen: Blood from Arm, Left Updated: 10/30/24 1258     WBC 6.99 Thousand/uL      RBC 4.35 Million/uL      Hemoglobin 14.1 g/dL      Hematocrit 42.5 %      MCV 98 fL      MCH 32.4 pg      MCHC 33.2 g/dL      RDW 11.3 %      MPV 9.4 fL      Platelets 249 Thousands/uL      nRBC 0 /100 WBCs      Segmented % 57 %      Immature Grans % 0 %      Lymphocytes % 31 %      Monocytes % 10 %      Eosinophils Relative 1 %      Basophils Relative 1 %      Absolute Neutrophils 4.06  Thousands/µL      Absolute Immature Grans 0.01 Thousand/uL      Absolute Lymphocytes 2.13 Thousands/µL      Absolute Monocytes 0.68 Thousand/µL      Eosinophils Absolute 0.07 Thousand/µL      Basophils Absolute 0.04 Thousands/µL             XR chest 1 view portable   ED Interpretation by Etelvina Camarena DO (10/30 8226)   No acute findings      Final Interpretation by Michael Wood MD (10/30 7678)      No acute cardiopulmonary disease.            Resident: SVETLANA Villalobos I, the attending radiologist, have reviewed the images and agree with the final report above.      Workstation performed: [id number]             ECG 12 Lead Documentation Only    Date/Time: 10/30/2024 12:48 PM    Performed by: Etelvina Camarena DO  Authorized by: Etelvina Camarena DO    Indications / Diagnosis:  Cp  ECG reviewed by me, the ED Provider: yes    Patient location:  ED  Previous ECG:     Previous ECG:  Unavailable  Interpretation:     Interpretation: normal    Quality:     Tracing quality:  Limited by artifact  Rate:     ECG rate:  60  Rhythm:     Rhythm: sinus rhythm    Ectopy:     Ectopy: none    QRS:     QRS axis:  Normal    QRS intervals:  Wide (Right bundle branch block with QRS measured at 128 ms)  Conduction:     Conduction: normal    ST segments:     ST segments:  Non-specific (Diffuse artifact)  T waves:     T waves: non-specific    Comments:      QTc measured at 463 ms  Normal axis      ED Medication and Procedure Management   Prior to Admission Medications   Prescriptions Last Dose Informant Patient Reported? Taking?   Multiple Vitamins-Minerals (multivitamin with minerals) tablet  Self Yes Yes   Sig: Take 1 tablet by mouth daily   Sodium Chloride-Xylitol (Xlear Sinus Care Spray) SOLN  Self Yes No   Sig: into each nostril   Zinc Sulfate (ZINC 15 PO)  Self Yes Yes   Sig: Take by mouth   atorvastatin (LIPITOR) 10 mg tablet   No Yes   Sig: Take 1 tablet (10 mg total) by mouth daily   betamethasone, augmented,  (DIPROLENE-AF) 0.05 % cream  Self No Yes   Sig: Apply topically 2 (two) times a day   cholecalciferol (VITAMIN D3) 1,000 units tablet  Self No Yes   Sig: Take 1 tablet (1,000 Units total) by mouth daily   cycloSPORINE (RESTASIS) 0.05 % ophthalmic emulsion  Self Yes Yes   Sig: instill 1 drop into both eyes twice a day   escitalopram (LEXAPRO) 10 mg tablet   No Yes   Sig: Take 1 tablet (10 mg total) by mouth daily   estradiol (ESTRACE) 0.1 mg/g vaginal cream  Self Yes Yes   Sig: Insert 2 g into the vagina   nystatin-triamcinolone (MYCOLOG-II) ointment Not Taking Self No No   Sig: Apply topically 2 (two) times a day   Patient not taking: Reported on 8/23/2024      Facility-Administered Medications: None     Current Discharge Medication List        CONTINUE these medications which have NOT CHANGED    Details   atorvastatin (LIPITOR) 10 mg tablet Take 1 tablet (10 mg total) by mouth daily  Qty: 90 tablet, Refills: 1    Associated Diagnoses: Mixed hyperlipidemia      betamethasone, augmented, (DIPROLENE-AF) 0.05 % cream Apply topically 2 (two) times a day  Qty: 30 g, Refills: 0    Associated Diagnoses: Dyshidrotic eczema      cholecalciferol (VITAMIN D3) 1,000 units tablet Take 1 tablet (1,000 Units total) by mouth daily  Qty: 30 tablet, Refills: 11    Associated Diagnoses: Vitamin D deficiency      cycloSPORINE (RESTASIS) 0.05 % ophthalmic emulsion instill 1 drop into both eyes twice a day      escitalopram (LEXAPRO) 10 mg tablet Take 1 tablet (10 mg total) by mouth daily  Qty: 90 tablet, Refills: 1    Associated Diagnoses: Bipolar 1 disorder (HCC)      estradiol (ESTRACE) 0.1 mg/g vaginal cream Insert 2 g into the vagina      Multiple Vitamins-Minerals (multivitamin with minerals) tablet Take 1 tablet by mouth daily      Zinc Sulfate (ZINC 15 PO) Take by mouth      nystatin-triamcinolone (MYCOLOG-II) ointment Apply topically 2 (two) times a day  Qty: 60 g, Refills: 1    Associated Diagnoses: Fungal dermatitis       Sodium Chloride-Xylitol (Xlear Sinus Care Spray) SOLN into each nostril           No discharge procedures on file.  ED SEPSIS DOCUMENTATION   Time reflects when diagnosis was documented in both MDM as applicable and the Disposition within this note       Time User Action Codes Description Comment    10/30/2024  2:00 PM Etelvina Camarena L Add [R07.89] Atypical chest pain     10/30/2024  2:00 PM Benddarius Etelvina L Remove [R07.89] Atypical chest pain     10/30/2024  2:00 PM BendEtelvina mccoy L Add [R07.9] Chest pain     10/30/2024  2:00 PM Benddarius Etelvina L Add [R79.89] Elevated troponin                  Etelvina Camarena DO  10/30/24 [id number]

## 2024-10-30 NOTE — H&P
H&P - Hospitalist   Name: Jinny Oneil 69 y.o. female I MRN: [id number]  Unit/Bed#: ED-09 I Date of Admission: 10/30/2024   Date of Service: 10/30/2024 I Hospital Day: 0     Assessment & Plan  Elevated troponin  Patient presented to hospital complaint of chest pain while sitting at outpatient veterinary office  EKG without acute ST changes  Troponin elevated at 77, continue trending  Patient notes near complete resolution in her chest pain upon presentation to the emergency room  Was previously admitted for elevated troponin in 2020, underwent stress test which was negative for acute findings  Will consult cardiology  Check echocardiogram  Monitor on telemetry  Loaded with aspirin, continue daily  Bipolar 1 disorder (HCC)  Continue daily Lexapro  Hyperlipidemia  Check lipid panel  Continue Lipitor  Hypomagnesemia  Magnesium 1.6  Replete and recheck      VTE Pharmacologic Prophylaxis: VTE Score: 3 Moderate Risk (Score 3-4) - Pharmacological DVT Prophylaxis Ordered: enoxaparin (Lovenox).  Code Status: No Order full code  Discussion with family: Updated  (significant other) at bedside.    Anticipated Length of Stay: Patient will be admitted on an observation basis with an anticipated length of stay of less than 2 midnights secondary to chest pain.    History of Present Illness   Chief Complaint: Chest pain    Jinny Oneil is a 69 y.o. female with a PMH of hyperlipidemia, bipolar disorder, elevated troponin who presents with chest pain.  Patient reports she was at her outpatient event appointment when she developed acute onset chest pain while her animal was getting a vaccine.  She noted associated dizziness but denies any shortness of breath, nausea or diaphoresis.  She reports this chest pain lasted approximately 30 minutes, has mostly resolved since presentation to the emergency room.  She was found to have mildly elevated troponin which she reports she was previously hospitalized for in 2020.   She reports she followed with a cardiologist in  at [facility] but has not been seen since.  Had a negative stress test at that time.    Review of Systems   Constitutional:  Negative for chills, diaphoresis and fever.   HENT:  Negative for trouble swallowing.    Eyes:  Negative for visual disturbance.   Respiratory:  Negative for cough and shortness of breath.    Cardiovascular:  Positive for chest pain. Negative for leg swelling.   Gastrointestinal:  Negative for abdominal pain, nausea and vomiting.   Genitourinary:  Negative for difficulty urinating.   Musculoskeletal:  Negative for gait problem.   Skin:  Negative for rash.   Allergic/Immunologic: Negative for immunocompromised state.   Neurological:  Positive for dizziness.   Psychiatric/Behavioral:  Negative for confusion.        Historical Information   Past Medical History:   Diagnosis Date    Anxiety     Colon polyp     COVID-19 virus infection 2021    Symptoms began .     Depression     Diverticulosis     Hyperlipidemia     Mild protein-calorie malnutrition (HCC) 2023    Rib fracture     Uncomplicated alcohol abuse      Past Surgical History:   Procedure Laterality Date    BREAST BIOPSY Right     benign    COLONOSCOPY       Social History     Tobacco Use    Smoking status: Former     Current packs/day: 0.00     Types: Cigarettes     Quit date: 2002     Years since quittin.7    Smokeless tobacco: Never   Vaping Use    Vaping status: Never Used   Substance and Sexual Activity    Alcohol use: Not Currently    Drug use: No    Sexual activity: Yes     E-Cigarette/Vaping    E-Cigarette Use Never User      E-Cigarette/Vaping Substances    Nicotine No     THC No     CBD No     Flavoring No      Family History   Problem Relation Age of Onset    Breast cancer Mother 65    Alcohol abuse Father     Coronary artery disease Father     No Known Problems Sister     No Known Problems Maternal Grandmother     No Known Problems Maternal  Grandfather     No Known Problems Paternal Grandmother     No Known Problems Paternal Grandfather     Alcohol abuse Brother     Bipolar disorder Brother     Alcohol abuse Brother     No Known Problems Maternal Aunt     No Known Problems Maternal Aunt     Cancer Maternal Aunt     No Known Problems Paternal Aunt     No Known Problems Paternal Aunt      Social History:  Marital Status: /Civil Union   Occupation: Unknown  Patient Pre-hospital Living Situation: Home  Patient Pre-hospital Level of Mobility: walks  Patient Pre-hospital Diet Restrictions: None    Meds/Allergies   I have reviewed home medications with patient personally.  Prior to Admission medications    Medication Sig Start Date End Date Taking? Authorizing Provider   atorvastatin (LIPITOR) 10 mg tablet Take 1 tablet (10 mg total) by mouth daily 8/23/24   Shahid Serrano MD   betamethasone, augmented, (DIPROLENE-AF) 0.05 % cream Apply topically 2 (two) times a day 7/9/24   BRIAN Rodriguez   cholecalciferol (VITAMIN D3) 1,000 units tablet Take 1 tablet (1,000 Units total) by mouth daily 10/4/19 8/23/24  Shahid Serrano MD   cycloSPORINE (RESTASIS) 0.05 % ophthalmic emulsion instill 1 drop into both eyes twice a day 6/24/24   Historical Provider, MD   escitalopram (LEXAPRO) 10 mg tablet Take 1 tablet (10 mg total) by mouth daily 8/23/24   Shahid Serrano MD   estradiol (ESTRACE) 0.1 mg/g vaginal cream Insert 2 g into the vagina    Historical Provider, MD   Multiple Vitamins-Minerals (multivitamin with minerals) tablet Take 1 tablet by mouth daily    Historical Provider, MD   nystatin-triamcinolone (MYCOLOG-II) ointment Apply topically 2 (two) times a day  Patient not taking: Reported on 8/23/2024 6/28/24   BRIAN Rodriguez   Sodium Chloride-Xylitol (Xlear Sinus Care Spray) SOLN into each nostril    Historical Provider, MD   Zinc Sulfate (ZINC 15 PO) Take by mouth    Historical Provider, MD     Allergies   Allergen  Reactions    Alcohol - Food Allergy Confusion and Delirium     In Recovery    Ibuprofen Diarrhea    Pear - Food Allergy Diarrhea     Diarrhea and colitis    Pravastatin Other (See Comments)     Medon ill    Singulair [Montelukast] Other (See Comments)     Ineffective      Zithromax [Azithromycin] Diarrhea and Rash       Objective :  Temp:  [97.5 °F (36.4 °C)] 97.5 °F (36.4 °C)  HR:  [54-57] 54  BP: (141-162)/(70-91) 141/91  Resp:  [18-22] 22  SpO2:  [100 %] 100 %  O2 Device: None (Room air)    Physical Exam  Vitals reviewed.   Constitutional:       General: She is not in acute distress.  HENT:      Head: Normocephalic and atraumatic.   Eyes:      General: No scleral icterus.     Conjunctiva/sclera: Conjunctivae normal.   Cardiovascular:      Rate and Rhythm: Normal rate and regular rhythm.      Heart sounds: No murmur heard.  Pulmonary:      Effort: Pulmonary effort is normal. No respiratory distress.      Breath sounds: Normal breath sounds.   Abdominal:      General: Bowel sounds are normal. There is no distension.      Palpations: Abdomen is soft.      Tenderness: There is no abdominal tenderness.   Musculoskeletal:      Cervical back: Neck supple.      Right lower leg: No edema.      Left lower leg: No edema.   Skin:     General: Skin is warm and dry.   Neurological:      Mental Status: She is alert and oriented to person, place, and time.   Psychiatric:         Mood and Affect: Mood normal.         Behavior: Behavior normal.          Lines/Drains:            Lab Results: I have reviewed the following results:  Results from last 7 days   Lab Units 10/30/24  1252   WBC Thousand/uL 6.99   HEMOGLOBIN g/dL 14.1   HEMATOCRIT % 42.5   PLATELETS Thousands/uL 249   SEGS PCT % 57   LYMPHO PCT % 31   MONO PCT % 10   EOS PCT % 1     Results from last 7 days   Lab Units 10/30/24  1410   SODIUM mmol/L 140   POTASSIUM mmol/L 3.5   CHLORIDE mmol/L 110*   CO2 mmol/L 24   BUN mg/dL 12   CREATININE mg/dL 0.45*   ANION GAP mmol/L 6  "  CALCIUM mg/dL 7.5*   ALBUMIN g/dL 3.6   TOTAL BILIRUBIN mg/dL 0.49   ALK PHOS U/L 35   ALT U/L 13   AST U/L 19   GLUCOSE RANDOM mg/dL 82     Results from last 7 days   Lab Units 10/30/24  1252   INR  1.06         No results found for: \"HGBA1C\"          Other Study Results Review: EKG was reviewed.     Administrative Statements       ** Please Note: This note has been constructed using a voice recognition system. **    "

## 2024-10-30 NOTE — CONSULTS
Consultation - Cardiology   Name: Jinny Oneil 69 y.o. female I MRN: [id number]  Unit/Bed#: E4 -01 I Date of Admission: 10/30/2024   Date of Service: 10/30/2024 I Hospital Day: 0   Inpatient consult to Cardiology  Consult performed by: Mikayla Perez PA-C  Consult ordered by: Nely Queen PA-C      Physician Requesting Evaluation: Guy Banerjee MD   Reason for Evaluation / Principal Problem: Elevated troponin  Assessment & Plan  Elevated troponin  CP free at this time. Will order SL Nitro PRN  EKG no acute ischemia  Trend trops  Continue home meds  Updated echo & stress test ordered  Abnormal stress test  2020 stress test with prior infarct on mid to prox inferolateral wall, no ischemia  EKG today with evidence of the same  Continue daily ASA 81 mg QD  Hyperlipidemia  Uncontrolled on Aug Lipid panel so Lipitor 10 mg QD was started  Sinus bradycardia  Asymptomatic  New dx this admission  Telemetry  Heart valve regurgitation  Mild AI, Mild MR & Mild TR on 2020 echo  Await updated echo  Carotid stenosis  <50% stenosis noted in the left internal carotid artery, 2021  RBBB  Chronic since at least 2020  Monitor on telemetry    History of Present Illness   Jinny Oneil is a 69 y.o. female who presented to [facility] ED on 10/30/24 from the vet office c/o CP x 30 minutes. Patient reports sudden onset midsternal chest pressure with radiation outward to the chest, assc with mild dizziness. Felt like indigestion she has had in the past but greater intensity. Felt also like her bra was too tight. Maybe mild SOB at the time. Patient sat in the car, sensation lasted 30 mins. She and  went home she layed down, asked her retired nurse friend for advice and had  drive her to the ED. Patient denies recent illness, recent issues with fatigue, QUEVEDO, coughing, peripheral edema, back pain, shoulder, jaw pain, epigastric pain or nausea. Patient denies palpitations or strong heart beat in the chest.    Primary  cardiologist: Dr. Tejeda from [facility]    Review of Systems   Constitutional:  Negative for chills, diaphoresis and fatigue.   Respiratory:  Positive for shortness of breath. Negative for cough.         - QUEVEDO   Cardiovascular:  Negative for leg swelling.        + chest pressure   Gastrointestinal:  Negative for abdominal pain and nausea.   Neurological:  Positive for dizziness. Negative for syncope and light-headedness.     Pertinent PMH: Bipolar 1 do, HLD, HTN, abnormal stress test, RBBB, cysts in liver, uterine fibroid, cyst on left kidney, GERD, syncope 1 time (2021)  No pertient surgical history.  FH: Father- CAD  Social history: Lives at home w . Smoked for 40 years and quit around age 48. Former alcohol use. No drug use.    Pertinent cardiac medications from home: Lipitor 10 mg QD & Vit D 1,000 units QD    Objective :  Temp:  [97.5 °F (36.4 °C)] 97.5 °F (36.4 °C)  HR:  [54-61] 61  BP: (141-162)/(68-91) 155/68  Resp:  [18-22] 18  SpO2:  [100 %] 100 %  O2 Device: None (Room air)    First Weight: Weight - Scale: 71.5 kg (157 lb 10.1 oz) (10/30/24 1244)  Vitals:    10/30/24 1430 10/30/24 1500   Weight: 71.5 kg (157 lb 10.1 oz) 69.4 kg (153 lb 1.6 oz)     Physical Exam  Vitals and nursing note reviewed.   Constitutional:       General: She is not in acute distress.     Appearance: She is not ill-appearing.   HENT:      Head: Normocephalic and atraumatic.      Nose: No congestion or rhinorrhea.      Mouth/Throat:      Mouth: Mucous membranes are moist.   Eyes:      Conjunctiva/sclera: Conjunctivae normal.   Neck:      Vascular: No JVD.   Cardiovascular:      Rate and Rhythm: Bradycardia present. Rhythm irregular.      Heart sounds: S1 normal and S2 normal. No murmur heard.  Pulmonary:      Effort: Pulmonary effort is normal.      Breath sounds: No wheezing, rhonchi or rales.   Abdominal:      General: Abdomen is flat.   Musculoskeletal:         General: No swelling.   Skin:     General: Skin is warm and dry.  "  Neurological:      Mental Status: She is alert and oriented to person, place, and time.   Psychiatric:         Behavior: Behavior normal.     Lab Results: I have reviewed the following results:  CBC, CMP,  Trops 77-38 - pending    Mag 1.6  Imaging:  10/30/2024 CXR no acute abnormality  2021 CT c/a/p - \"Lung nl, heart & vessels nl. Nondisplaced acute fracture of the right fifth and sixth ribs. No lung contusion or pneumothorax. There thoracic and abdominal aorta is normal caliber. No intra-abdominal solid organ injury. \"  Other studies: EKG on admission with SB hr 54. Nl axis. RBBB chronic. Chronic lateral infarct no ischemia.  Old EKG 2021 NSR with HR 63 bpm. Nl axis. RBBB. Lateral infarct.  Echo 2020 Normal LV size & fxn. LVEF 55-59%. Normal wall motion. Normal rv size & fxn. Mild bl atrial enlargement. Mild AI. Mild MR, Mild TR.  Lexiscan nuclear stress test- \"1. No evidence of ischemia. Fixed and absent perfusion in the mid to proximal inferolateral wall with associated hypokinesis suggestive of a prior infarct, possibly accentuated by diaphragmatic attenuation.   2. Normal left ventricular ejection fraction. \"  Telemetry so far NSR with average hr 58 bpm.    Mikayla Perez PA-C  "

## 2024-10-30 NOTE — ASSESSMENT & PLAN NOTE
2020 stress test with prior infarct on mid to prox inferolateral wall, no ischemia  EKG today with evidence of the same  Continue daily ASA 81 mg QD

## 2024-10-30 NOTE — ASSESSMENT & PLAN NOTE
Patient presented to hospital complaint of chest pain while sitting at outpatient veterinary office  EKG without acute ST changes  Troponin elevated at 77, continue trending  Patient notes near complete resolution in her chest pain upon presentation to the emergency room  Was previously admitted for elevated troponin in 2020, underwent stress test which was negative for acute findings  Will consult cardiology  Check echocardiogram  Monitor on telemetry  Loaded with aspirin, continue daily

## 2024-10-30 NOTE — PLAN OF CARE
Problem: PAIN - ADULT  Goal: Verbalizes/displays adequate comfort level or baseline comfort level  Description: Interventions:  - Encourage patient to monitor pain and request assistance  - Assess pain using appropriate pain scale  - Administer analgesics based on type and severity of pain and evaluate response  - Implement non-pharmacological measures as appropriate and evaluate response  - Consider cultural and social influences on pain and pain management  - Notify physician/advanced practitioner if interventions unsuccessful or patient reports new pain  Outcome: Progressing     Problem: INFECTION - ADULT  Goal: Absence or prevention of progression during hospitalization  Description: INTERVENTIONS:  - Assess and monitor for signs and symptoms of infection  - Monitor lab/diagnostic results  - Monitor all insertion sites, i.e. indwelling lines, tubes, and drains  - Monitor endotracheal if appropriate and nasal secretions for changes in amount and color  - Welcome appropriate cooling/warming therapies per order  - Administer medications as ordered  - Instruct and encourage patient and family to use good hand hygiene technique  - Identify and instruct in appropriate isolation precautions for identified infection/condition  Outcome: Progressing  Goal: Absence of fever/infection during neutropenic period  Description: INTERVENTIONS:  - Monitor WBC    Outcome: Progressing     Problem: SAFETY ADULT  Goal: Patient will remain free of falls  Description: INTERVENTIONS:  - Educate patient/family on patient safety including physical limitations  - Instruct patient to call for assistance with activity   - Consult OT/PT to assist with strengthening/mobility   - Keep Call bell within reach  - Keep bed low and locked with side rails adjusted as appropriate  - Keep care items and personal belongings within reach  - Initiate and maintain comfort rounds  - Make Fall Risk Sign visible to staff  - Offer Toileting every \ Hours,  in advance of need  - Initiate/Maintain \alarm  - Obtain necessary fall risk management equipment:   - Apply yellow socks and bracelet for high fall risk patients  - Consider moving patient to room near nurses station  Outcome: Progressing  Goal: Maintain or return to baseline ADL function  Description: INTERVENTIONS:  -  Assess patient's ability to carry out ADLs; assess patient's baseline for ADL function and identify physical deficits which impact ability to perform ADLs (bathing, care of mouth/teeth, toileting, grooming, dressing, etc.)  - Assess/evaluate cause of self-care deficits   - Assess range of motion  - Assess patient's mobility; develop plan if impaired  - Assess patient's need for assistive devices and provide as appropriate  - Encourage maximum independence but intervene and supervise when necessary  - Involve family in performance of ADLs  - Assess for home care needs following discharge   - Consider OT consult to assist with ADL evaluation and planning for discharge  - Provide patient education as appropriate  Outcome: Progressing  Goal: Maintains/Returns to pre admission functional level  Description: INTERVENTIONS:  - Perform AM-PAC 6 Click Basic Mobility/ Daily Activity assessment daily.  - Set and communicate daily mobility goal to care team and patient/family/caregiver.   - Collaborate with rehabilitation services on mobility goals if consulted  - Perform Range of Motion  times a day.  - Reposition patient every  hours.  - Dangle patient  times a day  - Stand patient  times a day  - Ambulate patient  times a day  - Out of bed to chair  times a day   - Out of bed for meals  times a day  - Out of bed for toileting  - Record patient progress and toleration of activity level   Outcome: Progressing     Problem: DISCHARGE PLANNING  Goal: Discharge to home or other facility with appropriate resources  Description: INTERVENTIONS:  - Identify barriers to discharge w/patient and caregiver  - Arrange for  needed discharge resources and transportation as appropriate  - Identify discharge learning needs (meds, wound care, etc.)  - Arrange for interpretive services to assist at discharge as needed  - Refer to Case Management Department for coordinating discharge planning if the patient needs post-hospital services based on physician/advanced practitioner order or complex needs related to functional status, cognitive ability, or social support system  Outcome: Progressing     Problem: Knowledge Deficit  Goal: Patient/family/caregiver demonstrates understanding of disease process, treatment plan, medications, and discharge instructions  Description: Complete learning assessment and assess knowledge base.  Interventions:  - Provide teaching at level of understanding  - Provide teaching via preferred learning methods  Outcome: Progressing

## 2024-10-30 NOTE — ASSESSMENT & PLAN NOTE
CP free at this time. Will order SL Nitro PRN  EKG no acute ischemia  Trend trops  Continue home meds  Updated echo & stress test ordered

## 2024-10-31 ENCOUNTER — APPOINTMENT (OUTPATIENT)
Dept: NON INVASIVE DIAGNOSTICS | Facility: HOSPITAL | Age: 69
End: 2024-10-31
Payer: MEDICARE

## 2024-10-31 ENCOUNTER — APPOINTMENT (OUTPATIENT)
Dept: NUCLEAR MEDICINE | Facility: HOSPITAL | Age: 69
End: 2024-10-31
Payer: MEDICARE

## 2024-10-31 VITALS
SYSTOLIC BLOOD PRESSURE: 150 MMHG | WEIGHT: 153 LBS | HEIGHT: 67 IN | OXYGEN SATURATION: 98 % | HEART RATE: 65 BPM | RESPIRATION RATE: 16 BRPM | DIASTOLIC BLOOD PRESSURE: 81 MMHG | TEMPERATURE: 97.9 F | BODY MASS INDEX: 24.01 KG/M2

## 2024-10-31 PROBLEM — R07.89 ATYPICAL CHEST PAIN: Status: ACTIVE | Noted: 2024-10-31

## 2024-10-31 PROBLEM — I42.9 CARDIOMYOPATHY (HCC): Status: ACTIVE | Noted: 2024-10-31

## 2024-10-31 LAB
ANION GAP SERPL CALCULATED.3IONS-SCNC: 6 MMOL/L (ref 4–13)
AORTIC ROOT: 3.4 CM
AORTIC VALVE MEAN VELOCITY: 7 M/S
APICAL FOUR CHAMBER EJECTION FRACTION: 52 %
ASCENDING AORTA: 3 CM
AV LVOT MEAN GRADIENT: 2 MMHG
AV LVOT PEAK GRADIENT: 5 MMHG
AV MEAN GRADIENT: 2 MMHG
AV PEAK GRADIENT: 5 MMHG
AV REGURGITATION PRESSURE HALF TIME: 705 MS
AV VELOCITY RATIO: 1.02
BSA FOR ECHO PROCEDURE: 1.8 M2
BUN SERPL-MCNC: 13 MG/DL (ref 5–25)
CALCIUM SERPL-MCNC: 8.7 MG/DL (ref 8.4–10.2)
CARDIAC TROPONIN I PNL SERPL HS: 89 NG/L (ref 8–18)
CHEST PAIN STATEMENT: NORMAL
CHLORIDE SERPL-SCNC: 107 MMOL/L (ref 96–108)
CHOLEST SERPL-MCNC: 164 MG/DL
CO2 SERPL-SCNC: 28 MMOL/L (ref 21–32)
CREAT SERPL-MCNC: 0.53 MG/DL (ref 0.6–1.3)
DOP CALC AO PEAK VEL: 1.14 M/S
DOP CALC AO VTI: 23.84 CM
DOP CALC LVOT PEAK VEL VTI: 25.81 CM
DOP CALC LVOT PEAK VEL: 1.16 M/S
E WAVE DECELERATION TIME: 189 MS
E/A RATIO: 1.16
ERYTHROCYTE [DISTWIDTH] IN BLOOD BY AUTOMATED COUNT: 11.3 % (ref 11.6–15.1)
FRACTIONAL SHORTENING: 20 (ref 28–44)
GFR SERPL CREATININE-BSD FRML MDRD: 95 ML/MIN/1.73SQ M
GLOBAL LONGITUIDAL STRAIN: -16 %
GLUCOSE P FAST SERPL-MCNC: 91 MG/DL (ref 65–99)
GLUCOSE SERPL-MCNC: 91 MG/DL (ref 65–140)
HCT VFR BLD AUTO: 38.7 % (ref 34.8–46.1)
HDLC SERPL-MCNC: 53 MG/DL
HGB BLD-MCNC: 13.2 G/DL (ref 11.5–15.4)
INTERVENTRICULAR SEPTUM IN DIASTOLE (PARASTERNAL SHORT AXIS VIEW): 0.9 CM
INTERVENTRICULAR SEPTUM: 0.9 CM (ref 0.6–1.1)
LAAS-AP2: 14.3 CM2
LAAS-AP4: 23.2 CM2
LDLC SERPL CALC-MCNC: 94 MG/DL (ref 0–100)
LEFT ATRIUM SIZE: 4.9 CM
LEFT ATRIUM VOLUME (MOD BIPLANE): 59 ML
LEFT ATRIUM VOLUME INDEX (MOD BIPLANE): 32.6 ML/M2
LEFT INTERNAL DIMENSION IN SYSTOLE: 4.4 CM (ref 2.1–4)
LEFT VENTRICULAR INTERNAL DIMENSION IN DIASTOLE: 5.5 CM (ref 3.5–6)
LEFT VENTRICULAR POSTERIOR WALL IN END DIASTOLE: 0.8 CM
LEFT VENTRICULAR STROKE VOLUME: 62 ML
LVSV (TEICH): 62 ML
MAX DIASTOLIC BP: 80 MMHG
MAX HR PERCENT: 67 %
MAX HR: 100 BPM
MAX PREDICTED HEART RATE: 149 BPM
MCH RBC QN AUTO: 33.4 PG (ref 26.8–34.3)
MCHC RBC AUTO-ENTMCNC: 34.1 G/DL (ref 31.4–37.4)
MCV RBC AUTO: 98 FL (ref 82–98)
MV E'TISSUE VEL-SEP: 5 CM/S
MV PEAK A VEL: 0.64 M/S
MV PEAK E VEL: 74 CM/S
MV STENOSIS PRESSURE HALF TIME: 55 MS
MV VALVE AREA P 1/2 METHOD: 4
NONHDLC SERPL-MCNC: 111 MG/DL
NUC STRESS EJECTION FRACTION: 58 %
PLATELET # BLD AUTO: 221 THOUSANDS/UL (ref 149–390)
PMV BLD AUTO: 9.4 FL (ref 8.9–12.7)
POTASSIUM SERPL-SCNC: 4.1 MMOL/L (ref 3.5–5.3)
PROTOCOL NAME: NORMAL
RA PRESSURE ESTIMATED: 3 MMHG
RATE PRESSURE PRODUCT: NORMAL
RBC # BLD AUTO: 3.95 MILLION/UL (ref 3.81–5.12)
REASON FOR TERMINATION: NORMAL
RIGHT ATRIAL 2D VOLUME: 31 ML
RIGHT ATRIUM AREA SYSTOLE A4C: 12.1 CM2
RIGHT VENTRICLE ID DIMENSION: 4 CM
RV PSP: 22 MMHG
SL CV AV DECELERATION TIME RETROGRADE: 2431 MS
SL CV AV PEAK GRADIENT RETROGRADE: 84 MMHG
SL CV LEFT ATRIUM LENGTH A2C: 4.5 CM
SL CV LV EF: 45
SL CV PED ECHO LEFT VENTRICLE DIASTOLIC VOLUME (MOD BIPLANE) 2D: 150 ML
SL CV PED ECHO LEFT VENTRICLE SYSTOLIC VOLUME (MOD BIPLANE) 2D: 88 ML
SL CV REST NUCLEAR ISOTOPE DOSE: 8.8 MCI
SL CV STRESS NUCLEAR ISOTOPE DOSE: 25 MCI
SL CV STRESS RECOVERY BP: NORMAL MMHG
SL CV STRESS RECOVERY HR: 78 BPM
SL CV STRESS RECOVERY O2 SAT: 99 %
SODIUM SERPL-SCNC: 141 MMOL/L (ref 135–147)
STRESS ANGINA INDEX: 0
STRESS BASELINE BP: NORMAL MMHG
STRESS BASELINE HR: 70 BPM
STRESS O2 SAT REST: 99 %
STRESS PEAK HR: 99 BPM
STRESS POST ESTIMATED WORKLOAD: 1.5 METS
STRESS POST EXERCISE DUR MIN: 3 MIN
STRESS POST EXERCISE DUR SEC: 0 SEC
STRESS POST O2 SAT PEAK: 99 %
STRESS POST PEAK BP: 128 MMHG
STRESS POST PEAK HR: 100 BPM
STRESS POST PEAK SYSTOLIC BP: 132 MMHG
STRESS/REST PERFUSION RATIO: 1.01
TARGET HR FORMULA: NORMAL
TEST INDICATION: NORMAL
TR MAX PG: 19 MMHG
TR PEAK VELOCITY: 2.2 M/S
TRICUSPID ANNULAR PLANE SYSTOLIC EXCURSION: 2.1 CM
TRICUSPID VALVE PEAK REGURGITATION VELOCITY: 2.18 M/S
TRIGL SERPL-MCNC: 83 MG/DL
WBC # BLD AUTO: 5.07 THOUSAND/UL (ref 4.31–10.16)

## 2024-10-31 PROCEDURE — 80048 BASIC METABOLIC PNL TOTAL CA: CPT | Performed by: PHYSICIAN ASSISTANT

## 2024-10-31 PROCEDURE — 93018 CV STRESS TEST I&R ONLY: CPT | Performed by: INTERNAL MEDICINE

## 2024-10-31 PROCEDURE — 99214 OFFICE O/P EST MOD 30 MIN: CPT | Performed by: INTERNAL MEDICINE

## 2024-10-31 PROCEDURE — 80061 LIPID PANEL: CPT | Performed by: PHYSICIAN ASSISTANT

## 2024-10-31 PROCEDURE — 85027 COMPLETE CBC AUTOMATED: CPT | Performed by: PHYSICIAN ASSISTANT

## 2024-10-31 PROCEDURE — 93306 TTE W/DOPPLER COMPLETE: CPT

## 2024-10-31 PROCEDURE — 93017 CV STRESS TEST TRACING ONLY: CPT

## 2024-10-31 PROCEDURE — 84484 ASSAY OF TROPONIN QUANT: CPT | Performed by: PHYSICIAN ASSISTANT

## 2024-10-31 PROCEDURE — 78452 HT MUSCLE IMAGE SPECT MULT: CPT | Performed by: INTERNAL MEDICINE

## 2024-10-31 PROCEDURE — 78452 HT MUSCLE IMAGE SPECT MULT: CPT

## 2024-10-31 PROCEDURE — 99239 HOSP IP/OBS DSCHRG MGMT >30: CPT | Performed by: INTERNAL MEDICINE

## 2024-10-31 PROCEDURE — 93016 CV STRESS TEST SUPVJ ONLY: CPT | Performed by: INTERNAL MEDICINE

## 2024-10-31 PROCEDURE — A9502 TC99M TETROFOSMIN: HCPCS

## 2024-10-31 RX ORDER — LISINOPRIL 20 MG/1
20 TABLET ORAL DAILY
Qty: 30 TABLET | Refills: 0 | Status: SHIPPED | OUTPATIENT
Start: 2024-10-31

## 2024-10-31 RX ORDER — ASPIRIN 81 MG/1
81 TABLET, CHEWABLE ORAL DAILY
Qty: 30 TABLET | Refills: 1 | Status: SHIPPED | OUTPATIENT
Start: 2024-11-01

## 2024-10-31 RX ORDER — REGADENOSON 0.08 MG/ML
0.4 INJECTION, SOLUTION INTRAVENOUS ONCE
Status: COMPLETED | OUTPATIENT
Start: 2024-10-31 | End: 2024-10-31

## 2024-10-31 RX ADMIN — ASPIRIN 81 MG CHEWABLE TABLET 81 MG: 81 TABLET CHEWABLE at 08:14

## 2024-10-31 RX ADMIN — Medication 1 TABLET: at 08:14

## 2024-10-31 RX ADMIN — ATORVASTATIN CALCIUM 10 MG: 10 TABLET, FILM COATED ORAL at 16:35

## 2024-10-31 RX ADMIN — ESCITALOPRAM OXALATE 10 MG: 10 TABLET ORAL at 08:14

## 2024-10-31 RX ADMIN — REGADENOSON 0.4 MG: 0.08 INJECTION, SOLUTION INTRAVENOUS at 11:34

## 2024-10-31 RX ADMIN — ENOXAPARIN SODIUM 40 MG: 100 INJECTION SUBCUTANEOUS at 08:14

## 2024-10-31 NOTE — ASSESSMENT & PLAN NOTE
Resolved  Low suspicion for ACS  Stress test negative  Continue Lipitor 10 mg daily  Prescribed aspirin 81 mg daily

## 2024-10-31 NOTE — DISCHARGE INSTR - AVS FIRST PAGE
"Please continue your healthy diet and activity including walks and volunteering.    Please get \"BMP\" blood test which I ordered at any [facility]'s lab in about 2 weeks from now (Nov 14th). You do not need to fast for this blood test. The order for it is in the computer so all you have to do it show up at the lab. The result will come to me and Dr. North (cardiologist).    Our office will call you to set up a hospital follow-up appointment with me or Dr. North in the [place] cardiology office across the street from the hospital. It should be in approximately 1 month time.  "
Adult

## 2024-10-31 NOTE — DISCHARGE SUMMARY
Discharge Summary - Hospitalist   Name: Jinny Yeagerk 69 y.o. female I MRN: [id number]  Unit/Bed#: E4 -01 I Date of Admission: 10/30/2024   Date of Service: 10/31/2024 I Hospital Day: 0     Assessment & Plan  Atypical chest pain  Resolved  Low suspicion for ACS  Stress test negative  Continue Lipitor 10 mg daily  Prescribed aspirin 81 mg daily  Elevated troponin  Mild elevated troponin with atypical chest pain  Stress test negative  Will DC on aspirin 81 mg daily, Lipitor 10 mg daily and lisinopril 20 mg daily  Outpatient cardiology follow-up in 1 month  Cardiomyopathy ([facility])  Echocardiogram today showed an EF of 45% and diastolic dysfunction  She also has stable mild regurgitation involving the aortic, mitral, tricuspid and pulmonic valves  Discussed with cardiology and she will be started on lisinopril 20 mg daily  Due to bradycardia, she will not be started on beta-blocker for now  She will have repeat BMP in 2 weeks per cardiology and will follow-up with them in 1 month  Bipolar 1 disorder ([facility])  Continue daily Lexapro  Hyperlipidemia  Continue Lipitor 10 mg daily  RBBB  Chronic since at least 2020     Medical Problems       Resolved Problems  Date Reviewed: 5/24/2024   None       Discharging Physician / Practitioner: Norm Turner MD  PCP: Shahid Serrano MD  Admission Date:   Admission Orders (From admission, onward)       Ordered        10/30/24 1401  Place in Observation  Once                          Discharge Date: 10/31/24    Consultations During Hospital Stay:  Cardiology    Procedures Performed:   none    Significant Findings / Test Results:   NM Myocardial Perfusion Spect (Pharmacological Induced Stress and/or Rest)  Result Date: 10/31/2024   Stress ECG: The stress ECG is negative for ischemia after maximal exercise, without reproduction of symptoms.   Perfusion: There is a left ventricular perfusion defect that is small in size present in the mid to basal inferolateral  location(s) that is fixed.   Stress Function: Left ventricular function post-stress is normal, although with inferolateral hypokinesis. Stress ejection fraction is 58%.   Stress Combined Conclusion: Findings are consistent with infarction of the mid-basal inferolateral myocadial wall segments with no evidence of myocardial ischemia.     Echo complete  Result Date: 10/31/2024  Narrative:   Left Ventricle: Left ventricular cavity size is mildly dilated. Wall thickness is normal. The left ventricular ejection fraction is 45%. Systolic function is mildly reduced. Global longitudinal strain is reduced at -16.5%. Diastolic function is mildly abnormal, consistent with grade I (abnormal) relaxation.   The following segments are hypokinetic: basal inferolateral, basal anterolateral, mid inferolateral, mid anterolateral and apical lateral.   All other segments are normal.   Left Atrium: The atrium is mildly dilated.   Aortic Valve: There is mild regurgitation.   Mitral Valve: There is mild regurgitation.   Tricuspid Valve: There is mild regurgitation.   Pulmonic Valve: There is trace regurgitation.     XR chest 1 view portable  Result Date: 10/30/2024  Impression: No acute cardiopulmonary disease. Resident: SVETLANA Villalobos I, the attending radiologist, have reviewed the images and agree with the final report above.     Incidental Findings:   See above     Test Results Pending at Discharge (will require follow up):   none     Outpatient Tests Requested:  none    Complications:  none    Reason for Admission: Chest pain    Hospital Course:   Jinny Oneil is a 69 y.o. female patient who originally presented to the hospital on 10/30/2024 due to  midsternal chest pain described as pressure lasting for approximately 30 minutes.  She sought advice from her friend who is a retired nurse who told her to come to the emergency department.  Her  brought her here and while in the ED she was found to have mildly elevated troponin of  "[id number] prompting admission and further evaluation.    She was evaluated by cardiology who ordered an echocardiogram and nuclear stress test.  The stress test showed no ischemia.  It did show a fixed  defect in the inferior lateral wall which was present since 2020.  Her echocardiogram showed an EF of 45% with stable mild valvular regurgitation.    Due to these findings was started on aspirin 81 mg daily and lisinopril 20 mg daily on discharge.  She will continue Lipitor 10 mg daily.  Cardiology is ordering repeat BMP in 2 weeks and follow-up in a month    Please see above list of diagnoses and related plan for additional information.     Condition at Discharge: good    Discharge Day Visit / Exam:   Subjective: No chest pain or pressure since admission.  Vitals: Blood Pressure: 150/81 (10/31/24 0838)  Pulse: 65 (10/31/24 0838)  Temperature: 97.9 °F (36.6 °C) (10/31/24 0745)  Temp Source: Temporal (10/31/24 0745)  Respirations: 16 (10/31/24 0745)  Height: 5' 7\" (170.2 cm) (10/31/24 1126)  Weight - Scale: 69.4 kg (153 lb) (10/31/24 1126)  SpO2: 98 % (10/31/24 0745)  Physical Exam  Vitals reviewed.   Constitutional:       Appearance: She is not ill-appearing or diaphoretic.   HENT:      Head: Normocephalic and atraumatic.      Nose: No congestion or rhinorrhea.   Eyes:      General: No scleral icterus.  Cardiovascular:      Rate and Rhythm: Normal rate and regular rhythm.   Pulmonary:      Breath sounds: No wheezing or rhonchi.   Abdominal:      Palpations: Abdomen is soft.      Tenderness: There is no abdominal tenderness. There is no guarding.   Musculoskeletal:      Right lower leg: No edema.      Left lower leg: No edema.   Skin:     General: Skin is warm and dry.   Neurological:      Mental Status: She is oriented to person, place, and time.   Psychiatric:         Mood and Affect: Mood normal.         Behavior: Behavior normal.     Discussion with Family: Updated  () at bedside.    Discharge " instructions/Information to patient and family:   See after visit summary for information provided to patient and family.      Provisions for Follow-Up Care:  See after visit summary for information related to follow-up care and any pertinent home health orders.       Disposition:   Home    Planned Readmission: no    Discharge Medications:  See after visit summary for reconciled discharge medications provided to patient and/or family.      Administrative Statements   Discharge Statement:  I have spent a total time of >30 minutes in caring for this patient on the day of the visit/encounter. .    **Please Note: This note may have been constructed using a voice recognition system**

## 2024-10-31 NOTE — PLAN OF CARE
Problem: PAIN - ADULT  Goal: Verbalizes/displays adequate comfort level or baseline comfort level  Description: Interventions:  - Encourage patient to monitor pain and request assistance  - Assess pain using appropriate pain scale  - Administer analgesics based on type and severity of pain and evaluate response  - Implement non-pharmacological measures as appropriate and evaluate response  - Consider cultural and social influences on pain and pain management  - Notify physician/advanced practitioner if interventions unsuccessful or patient reports new pain  Outcome: Progressing     Problem: INFECTION - ADULT  Goal: Absence or prevention of progression during hospitalization  Description: INTERVENTIONS:  - Assess and monitor for signs and symptoms of infection  - Monitor lab/diagnostic results  - Monitor all insertion sites, i.e. indwelling lines, tubes, and drains  - Monitor endotracheal if appropriate and nasal secretions for changes in amount and color  - [place] appropriate cooling/warming therapies per order  - Administer medications as ordered  - Instruct and encourage patient and family to use good hand hygiene technique  - Identify and instruct in appropriate isolation precautions for identified infection/condition  Outcome: Progressing  Goal: Absence of fever/infection during neutropenic period  Description: INTERVENTIONS:  - Monitor WBC    Outcome: Progressing     Problem: SAFETY ADULT  Goal: Patient will remain free of falls  Description: INTERVENTIONS:  - Educate patient/family on patient safety including physical limitations  - Instruct patient to call for assistance with activity   - Consult OT/PT to assist with strengthening/mobility   - Keep Call bell within reach  - Keep bed low and locked with side rails adjusted as appropriate  - Keep care items and personal belongings within reach  - Initiate and maintain comfort rounds  - Make Fall Risk Sign visible to staff  - Offer Toileting every 2 Hours,  in advance of need  - Initiate/Maintain alarm  - Obtain necessary fall risk management equipment:   - Apply yellow socks and bracelet for high fall risk patients  - Consider moving patient to room near nurses station  Outcome: Progressing  Goal: Maintain or return to baseline ADL function  Description: INTERVENTIONS:  -  Assess patient's ability to carry out ADLs; assess patient's baseline for ADL function and identify physical deficits which impact ability to perform ADLs (bathing, care of mouth/teeth, toileting, grooming, dressing, etc.)  - Assess/evaluate cause of self-care deficits   - Assess range of motion  - Assess patient's mobility; develop plan if impaired  - Assess patient's need for assistive devices and provide as appropriate  - Encourage maximum independence but intervene and supervise when necessary  - Involve family in performance of ADLs  - Assess for home care needs following discharge   - Consider OT consult to assist with ADL evaluation and planning for discharge  - Provide patient education as appropriate  Outcome: Progressing  Goal: Maintains/Returns to pre admission functional level  Description: INTERVENTIONS:  - Perform AM-PAC 6 Click Basic Mobility/ Daily Activity assessment daily.  - Set and communicate daily mobility goal to care team and patient/family/caregiver.   - Collaborate with rehabilitation services on mobility goals if consulted  - Perform Range of Motion 3 times a day.  - Reposition patient every 2 hours.  - Dangle patient 3 times a day  - Stand patient 3 times a day  - Ambulate patient 3 times a day  - Out of bed to chair 3 times a day   - Out of bed for meals 3 times a day  - Out of bed for toileting  - Record patient progress and toleration of activity level   Outcome: Progressing     Problem: DISCHARGE PLANNING  Goal: Discharge to home or other facility with appropriate resources  Description: INTERVENTIONS:  - Identify barriers to discharge w/patient and caregiver  -  Arrange for needed discharge resources and transportation as appropriate  - Identify discharge learning needs (meds, wound care, etc.)  - Arrange for interpretive services to assist at discharge as needed  - Refer to Case Management Department for coordinating discharge planning if the patient needs post-hospital services based on physician/advanced practitioner order or complex needs related to functional status, cognitive ability, or social support system  Outcome: Progressing     Problem: Knowledge Deficit  Goal: Patient/family/caregiver demonstrates understanding of disease process, treatment plan, medications, and discharge instructions  Description: Complete learning assessment and assess knowledge base.  Interventions:  - Provide teaching at level of understanding  - Provide teaching via preferred learning methods  Outcome: Progressing

## 2024-10-31 NOTE — PLAN OF CARE
Problem: PAIN - ADULT  Goal: Verbalizes/displays adequate comfort level or baseline comfort level  Description: Interventions:  - Encourage patient to monitor pain and request assistance  - Assess pain using appropriate pain scale  - Administer analgesics based on type and severity of pain and evaluate response  - Implement non-pharmacological measures as appropriate and evaluate response  - Consider cultural and social influences on pain and pain management  - Notify physician/advanced practitioner if interventions unsuccessful or patient reports new pain  Outcome: Progressing     Problem: INFECTION - ADULT  Goal: Absence or prevention of progression during hospitalization  Description: INTERVENTIONS:  - Assess and monitor for signs and symptoms of infection  - Monitor lab/diagnostic results  - Monitor all insertion sites, i.e. indwelling lines, tubes, and drains  - Monitor endotracheal if appropriate and nasal secretions for changes in amount and color  - [place] appropriate cooling/warming therapies per order  - Administer medications as ordered  - Instruct and encourage patient and family to use good hand hygiene technique  - Identify and instruct in appropriate isolation precautions for identified infection/condition  Outcome: Progressing  Goal: Absence of fever/infection during neutropenic period  Description: INTERVENTIONS:  - Monitor WBC    Outcome: Progressing     Problem: SAFETY ADULT  Goal: Patient will remain free of falls  Description: INTERVENTIONS:  - Educate patient/family on patient safety including physical limitations  - Instruct patient to call for assistance with activity   - Consult OT/PT to assist with strengthening/mobility   - Keep Call bell within reach  - Keep bed low and locked with side rails adjusted as appropriate  - Keep care items and personal belongings within reach  - Initiate and maintain comfort rounds  - Make Fall Risk Sign visible to staff  - Offer Toileting every 2 Hours,  in advance of need  - Initiate/Maintain bed alarm  - Obtain necessary fall risk management equipment:   - Apply yellow socks and bracelet for high fall risk patients  - Consider moving patient to room near nurses station  Outcome: Progressing  Goal: Maintain or return to baseline ADL function  Description: INTERVENTIONS:  -  Assess patient's ability to carry out ADLs; assess patient's baseline for ADL function and identify physical deficits which impact ability to perform ADLs (bathing, care of mouth/teeth, toileting, grooming, dressing, etc.)  - Assess/evaluate cause of self-care deficits   - Assess range of motion  - Assess patient's mobility; develop plan if impaired  - Assess patient's need for assistive devices and provide as appropriate  - Encourage maximum independence but intervene and supervise when necessary  - Involve family in performance of ADLs  - Assess for home care needs following discharge   - Consider OT consult to assist with ADL evaluation and planning for discharge  - Provide patient education as appropriate  Outcome: Progressing  Goal: Maintains/Returns to pre admission functional level  Description: INTERVENTIONS:  - Perform AM-PAC 6 Click Basic Mobility/ Daily Activity assessment daily.  - Set and communicate daily mobility goal to care team and patient/family/caregiver.   - Collaborate with rehabilitation services on mobility goals if consulted  - Perform Range of Motion 3 times a day.  - Reposition patient every 2 hours.  - Dangle patient 3 times a day  - Stand patient 3 times a day  - Ambulate patient 3 times a day  - Out of bed to chair 3 times a day   - Out of bed for meals 3 times a day  - Out of bed for toileting  - Record patient progress and toleration of activity level   Outcome: Progressing     Problem: DISCHARGE PLANNING  Goal: Discharge to home or other facility with appropriate resources  Description: INTERVENTIONS:  - Identify barriers to discharge w/patient and caregiver  -  Arrange for needed discharge resources and transportation as appropriate  - Identify discharge learning needs (meds, wound care, etc.)  - Arrange for interpretive services to assist at discharge as needed  - Refer to Case Management Department for coordinating discharge planning if the patient needs post-hospital services based on physician/advanced practitioner order or complex needs related to functional status, cognitive ability, or social support system  Outcome: Progressing     Problem: Knowledge Deficit  Goal: Patient/family/caregiver demonstrates understanding of disease process, treatment plan, medications, and discharge instructions  Description: Complete learning assessment and assess knowledge base.  Interventions:  - Provide teaching at level of understanding  - Provide teaching via preferred learning methods  Outcome: Progressing

## 2024-10-31 NOTE — ASSESSMENT & PLAN NOTE
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  EKG today with evidence of the same  Continue daily ASA 81 mg QD on dc

## 2024-10-31 NOTE — CASE MANAGEMENT
Case Management Assessment & Discharge Planning Note    Patient name Jinny Oneil  Location [facility] /E4 -* MRN [id number]  : 1955 Date 10/31/2024       Current Admission Date: 10/30/2024  Current Admission Diagnosis:Elevated troponin   Patient Active Problem List    Diagnosis Date Noted Date Diagnosed    Hypomagnesemia 10/30/2024     RBBB 10/30/2024     Dyshidrotic eczema 2024     Fungal dermatitis 2024     Insect bite of left upper arm 2024     Lymphocytic colitis 2023     IBS (irritable bowel syndrome) 2022     Heart valve regurgitation 2021     Carotid stenosis 2021     Syncope and collapse 2021     Abnormal stress test 2020     Elevated troponin 2020     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/pharmacy #[id number] - [place], [place] - [address] [address]  Phone: [phone] Fax: [phone]    Primary Care Provider: Shahid Serrano MD    Primary Insurance: MEDICARE  Secondary Insurance: [facility] HEALTH OPTIONS PROGRAM    ASSESSMENT:  Active Health Care Proxies    There are no active Health Care Proxies on file.                      Patient Information  Admitted from:: Home  Mental Status: Alert  During Assessment patient was accompanied by: Spouse  Assessment information provided by:: Patient, Spouse  [place] Residence: [facility]  [facility] do you live in?: [place]    Activities of Daily Living Prior to Admission  Functional Status: Independent         Patient Information Continued  Does patient have a history of substance abuse?: No  Does patient have a history of Mental Health Diagnosis?: No         Means of Transportation  Means of Transport to Appts:: Family transport      Social Determinants of Health (SDOH)      Flowsheet Row Most Recent Value   Housing Stability    In the past 12 months, how many times have you moved where you were living? 0   At any time in the past 12 months, were you homeless or living in a shelter (including now)? N   Transportation Needs    In the past 12 months, has lack of transportation kept you from medical appointments or from getting medications? no   In the past 12 months, has lack of transportation kept you from meetings, work, or from getting things needed for daily living? No   Food Insecurity    Within the past 12 months, you worried that your food would run out before you got the money to buy more. Never true   Within the past 12 months, the food you bought just didn't last and you didn't have money to get more. Never true   Utilities    In the past 12 months has the electric, gas, oil, or water company threatened to shut off services in your home? No            DISCHARGE DETAILS:    Discharge planning discussed with:: pt and      Comments - Freedom of Choice: Expecting discharge to home pending results of the stress test.  CM contacted family/caregiver?: Yes  Were Treatment Team discharge recommendations reviewed with patient/caregiver?: Yes  Did patient/caregiver verbalize understanding of patient care needs?: Yes  Were patient/caregiver advised of the risks associated with not following Treatment Team discharge recommendations?: Yes    Contacts  Patient Contacts: Norm (Spouse)  [phone]  Relationship to Patient:: Family  Contact Method: In Person  Reason/Outcome: Continuity of Care,  Discharge Planning                        Treatment Team Recommendation: Home  Discharge Destination Plan:: Home  Transport at Discharge : Family                                      Additional Comments: CM met with the pt and .  Pt had stress test today and will plan to discharge to home pending the resulsts of the stress test.   to transport and no needs identified.

## 2024-10-31 NOTE — ASSESSMENT & PLAN NOTE
Pre-HF, HFrEF, LVEF 45%, LVIDd 5.5 cm, NYHA Class I, AHA Stage B, likely ICM  Neurohormonal Blockade:  --Beta Blocker: Avoiding given sinus bradycardia  --ARNi / ACEi / ARB: Start Lisinopril 20 mg QD  --Aldosterone Antagonist: None  --SGLT2 Inhibitor: None  --Home Diuretic: Euvolemic without    Euvolemic today @ 153#.  BMP in 2 weeks  I will message our office to schedule hospital follow up.

## 2024-10-31 NOTE — PROGRESS NOTES
Progress Note - Cardiology   Name: Jinny PEDRAZA Malek 69 y.o. female I MRN: [id number]  Unit/Bed#: E4 -01 I Date of Admission: 10/30/2024   Date of Service: 10/31/2024 I Hospital Day: 0  Assessment & Plan  Elevated troponin  Likely non- ischemic myocardial injury  Add ASA  Abnormal stress test  Old infarct on mid to prox inferolateral wall, no ischemia - present on 2020 stress test and today's stress test, too.  EKG today with evidence of the same  Continue daily ASA 81 mg QD on dc  Cardiomyopathy (HCC)  Pre-HF, HFrEF, LVEF 45%, LVIDd 5.5 cm, NYHA Class I, AHA Stage B, likely ICM  Neurohormonal Blockade:  --Beta Blocker: Avoiding given sinus bradycardia  --ARNi / ACEi / ARB: Start Lisinopril 20 mg QD  --Aldosterone Antagonist: None  --SGLT2 Inhibitor: None  --Home Diuretic: Euvolemic without    Euvolemic today @ 153#.  BMP in 2 weeks  I will message our office to schedule hospital follow up.  Hyperlipidemia  Lipid panel today shows values improved on Lipitor 10 mg QD, continue.  Sinus bradycardia  Asymptomatic  New dx this admission  Heart valve regurgitation  Stable on echo today (compared to 2020)  RBBB  Chronic since at least 2020    Subjective   Patient denies chest pain, chest pressure, chest discomfort or burning, shortness of breath, lightheadedness or dizziness today. Denies peripheral edema.    Objective :  Temp:  [97.5 °F (36.4 °C)-97.9 °F (36.6 °C)] 97.9 °F (36.6 °C)  HR:  [63-68] 65  BP: (111-150)/(59-81) 150/81  Resp:  [16-18] 16  SpO2:  [96 %-100 %] 98 %  O2 Device: None (Room air)    First Weight: Weight - Scale: 71.5 kg (157 lb 10.1 oz) (10/30/24 1244)  Vitals:    10/31/24 0838 10/31/24 1126   Weight: 69.4 kg (153 lb) 69.4 kg (153 lb)     Physical Exam  Vitals and nursing note reviewed.   Constitutional:       General: She is not in acute distress.  HENT:      Head: Normocephalic and atraumatic.      Nose: No congestion or rhinorrhea.      Mouth/Throat:      Mouth: Mucous membranes are moist.    Eyes:      Conjunctiva/sclera: Conjunctivae normal.   Neck:      Vascular: No JVD.   Cardiovascular:      Rate and Rhythm: Normal rate and regular rhythm.      Heart sounds: S1 normal and S2 normal. No murmur heard.  Pulmonary:      Effort: Pulmonary effort is normal.      Breath sounds: No wheezing, rhonchi or rales.   Abdominal:      General: Abdomen is flat.   Musculoskeletal:         General: No swelling.   Skin:     General: Skin is warm and dry.   Neurological:      Mental Status: She is alert and oriented to person, place, and time.   Psychiatric:         Behavior: Behavior normal.     Lab Results: I have reviewed the following results:  Trops trended 77-38-84-89    I reviewed the CBC and BMP from today  Other studies: Echo 10/31/2024-mildly dilated LV, LVEF 45%.  Global longitudinal strain reduced at -16.5%. G1DD.  Basal inferolateral, anterolateral and mid anterolateral and apical lateral segments of the wall are hypokinetic.  Mild LAE.  Mild AI.  Mild MR.  Mild TR.  Trace pulmonic valve regurg.  Lexiscan nuclear stress test 10/31/2024-EKG with RBBB.  Left ventricular perfusion defect is small.  It is present in the mid to basal inferolateral walls, fixed.  Post stress LVEF 58%.  Finding is consistent with old infarct of the mid/basal inferolateral myocardial wall segments.  No evidence of myocardial ischemia.    Mikayla Perez PA-C

## 2024-10-31 NOTE — ASSESSMENT & PLAN NOTE
Mild elevated troponin with atypical chest pain  Stress test negative  Will DC on aspirin 81 mg daily, Lipitor 10 mg daily and lisinopril 20 mg daily  Outpatient cardiology follow-up in 1 month

## 2024-10-31 NOTE — ASSESSMENT & PLAN NOTE
Echocardiogram today showed an EF of 45% and diastolic dysfunction  She also has stable mild regurgitation involving the aortic, mitral, tricuspid and pulmonic valves  Discussed with cardiology and she will be started on lisinopril 20 mg daily  Due to bradycardia, she will not be started on beta-blocker for now  She will have repeat BMP in 2 weeks per cardiology and will follow-up with them in 1 month

## 2024-11-01 ENCOUNTER — NURSE TRIAGE (OUTPATIENT)
Age: 69
End: 2024-11-01

## 2024-11-01 NOTE — TELEPHONE ENCOUNTER
Called and informed patient that per Mikayla it is ok to stay off the lisinopril and they will revisit at her appt on 11/21

## 2024-11-01 NOTE — TELEPHONE ENCOUNTER
"Reason for Conversation: Pt was in the hospital from 10/30-10/31 for chest pain. Pt was d/c' on Asa 81mg, lipitor 10mg daily, and lisinopril 20mg daily. Received call from pt stating she woke up this am feeling great and even took a walk around her neighborhood. She stated she took the prescribed lisinopril 20mg around 12pm and then one hour later started feeling nausea/indigestion, mild sob, and \"fuzziness\" in her ears. Denies chest pain. Denies sob at this time. She states she believes her s/s are due to the lisinopril and would like to stop taking it. She stated she never had a BP problem in the past however she does not take her BP at home and collins snot know what her normal BP is.     Pt has an appt on 11/21. Advised pt will send a message to Mikayla Perez PA-C who she saw in the hospital to review and advise.     VS/Weight: (Note: Please include date/time vitals/weight were measured)  Pt does not take at home    Pain: No    Risk Factors: cardiomyopathy    Recent relevant testing and date of testing: echo , stress test on 10/31.    Medication: Asa 81mg, lipitor 10mg daily, and lisinopril 20mg daily.     Upcoming Office Visit: Yes 11/21    Last Office Visit: Seen in hospital 10/30-10/31        Reason for Disposition   Caller has NON-URGENT medicine question about med that PCP or specialist prescribed and triager unable to answer question    Answer Assessment - Initial Assessment Questions  1. NAME of MEDICINE: \"What medicine(s) are you calling about?\"      Lisinopril 20mg daily   2. QUESTION: \"What is your question?\" (e.g., double dose of medicine, side effect)      Medication side effect   3. PRESCRIBER: \"Who prescribed the medicine?\" Reason: if prescribed by specialist, call should be referred to that group.      Was placed on Lisinopril while in the hospital. Pt was d/c'd yesterday.  4. SYMPTOMS: \"Do you have any symptoms?\" If Yes, ask: \"What symptoms are you having?\"  \"How bad are the symptoms (e.g., mild, " "moderate, severe)      Pt stated has been feeling nausea / indigestion, mild sob, and \"fuzziness in ears\" starting one hour after taking the lisinopril. Denies chest pain, headaches, nor dizziness.    Protocols used: Medication Question Call-Adult-[place]    "

## 2024-11-04 ENCOUNTER — TRANSITIONAL CARE MANAGEMENT (OUTPATIENT)
Dept: FAMILY MEDICINE CLINIC | Facility: CLINIC | Age: 69
End: 2024-11-04

## 2024-11-06 ENCOUNTER — TELEPHONE (OUTPATIENT)
Dept: ADMINISTRATIVE | Facility: OTHER | Age: 69
End: 2024-11-06

## 2024-11-06 NOTE — TELEPHONE ENCOUNTER
11/06/24 11:19 AM    Patient contacted to bring Advance Directive, POLST, or Living Will document to next scheduled pcp visit.[facility] Department spoke with patient/ caregiver.    Thank you.  Sari Godfrey  [facility]

## 2024-11-08 ENCOUNTER — APPOINTMENT (OUTPATIENT)
Dept: LAB | Facility: MEDICAL CENTER | Age: 69
End: 2024-11-08
Payer: MEDICARE

## 2024-11-08 DIAGNOSIS — R79.89 ELEVATED TROPONIN: ICD-10-CM

## 2024-11-08 DIAGNOSIS — R07.9 CHEST PAIN: ICD-10-CM

## 2024-11-08 DIAGNOSIS — S40.862A INSECT BITE OF LEFT UPPER ARM, INITIAL ENCOUNTER: ICD-10-CM
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
## 2024-11-08 DIAGNOSIS — I42.9 CARDIOMYOPATHY (HCC): ICD-10-CM

## 2024-11-08 LAB
ANION GAP SERPL CALCULATED.3IONS-SCNC: 7 MMOL/L (ref 4–13)
BUN SERPL-MCNC: 15 MG/DL (ref 5–25)
CALCIUM SERPL-MCNC: 8.8 MG/DL (ref 8.4–10.2)
CHLORIDE SERPL-SCNC: 104 MMOL/L (ref 96–108)
CO2 SERPL-SCNC: 28 MMOL/L (ref 21–32)
CREAT SERPL-MCNC: 0.6 MG/DL (ref 0.6–1.3)
GFR SERPL CREATININE-BSD FRML MDRD: 91 ML/MIN/1.73SQ M
GLUCOSE P FAST SERPL-MCNC: 81 MG/DL (ref 65–99)
POTASSIUM SERPL-SCNC: 4.2 MMOL/L (ref 3.5–5.3)
SODIUM SERPL-SCNC: 139 MMOL/L (ref 135–147)

## 2024-11-08 PROCEDURE — 36415 COLL VENOUS BLD VENIPUNCTURE: CPT

## 2024-11-08 PROCEDURE — 80048 BASIC METABOLIC PNL TOTAL CA: CPT

## 2024-11-19 ENCOUNTER — OFFICE VISIT (OUTPATIENT)
Dept: FAMILY MEDICINE CLINIC | Facility: CLINIC | Age: 69
End: 2024-11-19
Payer: MEDICARE

## 2024-11-19 VITALS
DIASTOLIC BLOOD PRESSURE: 60 MMHG | BODY MASS INDEX: 24.11 KG/M2 | HEIGHT: 67 IN | WEIGHT: 153.6 LBS | OXYGEN SATURATION: 98 % | SYSTOLIC BLOOD PRESSURE: 112 MMHG | HEART RATE: 58 BPM

## 2024-11-19 DIAGNOSIS — I38 HEART VALVE REGURGITATION: ICD-10-CM

## 2024-11-19 DIAGNOSIS — E78.2 MIXED HYPERLIPIDEMIA: ICD-10-CM

## 2024-11-19 DIAGNOSIS — R07.89 ATYPICAL CHEST PAIN: Primary | ICD-10-CM

## 2024-11-19 DIAGNOSIS — I42.9 CARDIOMYOPATHY, UNSPECIFIED TYPE (HCC): ICD-10-CM

## 2024-11-19 PROCEDURE — G2211 COMPLEX E/M VISIT ADD ON: HCPCS | Performed by: FAMILY MEDICINE

## 2024-11-19 PROCEDURE — 99214 OFFICE O/P EST MOD 30 MIN: CPT | Performed by: FAMILY MEDICINE

## 2024-11-19 NOTE — ASSESSMENT & PLAN NOTE
Echo did show minor decrease in ejection fraction to 45%.  I did review prior echo from 2021 that said that she had an ejection fraction of 60%.  Would certainly recommend follow-up with cardiology.  Reviewed about the slight reduction in echo estimated ejection fraction.    Reviewed with her about ACE inhibitor's, angiotensin receptor blockers, beta-blockers, and their benefits with regard to heart failure risk reduction and longevity.

## 2024-11-19 NOTE — PROGRESS NOTES
Cardiology Follow Up    [facility] [address] [address]  PHONE: [phone]  FAX: [phone]    Jinny Oneil  1955  [id number]    Assessment/Plan:  Atypical chest pain  CAD, prior mid to basal inferolateral infarction without ischemia on nuclear stress test, 10/31/24  Mild ischemic cardiomyopathy, present since 2020  Pre-HF, reduced EF, LVEF 45%, LVIDd 5.5 cm, NYHA Class I, AHA Stage B  Dyslipidemia  Chronic right bundle branch block  Sinus bradycardia    No chest pain. No s/sxs of heart failure.    BP is WNL off on Lisinopril. We discussed that ACE-I, ARB & ARNI or even BB medications would be used in her case for Heart failure, not for antihypertensive. I will send her some research about Entresto.     She would probably qualify for cardiac rehab but she is interested in the [employer] [facility] program. Referral placed.    RTO in 6 months with  or Dr. North.     Interval History:   Jinny Oneil is a 69 y.o. female with past medical history as below who presents to the office for follow-up after hospitalization for chest pain.  Found to have elevated troponin and new diagnosis mild ischemic cardiomyopathy.  Stress test without ischemia and chest pain resolved so there was no cardiac catheterization.  She was started on GDMT for CAD and cardiomyopathy.    Since her discharge to home patient reports about 1 hour after taking lisinopril for the first time she felt nausea, SOB & fuzziness in her ears. She stopped taking Lisinopril after that. Otherwise has no complaints. She is very active in [facility] and with her family including babysitting great-grandchildren!    Denies chest pain, chest pressure, chest discomfort or burning, shortness of breath, dyspnea on exertion, palpitations, skipped heartbeats, lightheadedness, dizziness, presyncope or syncope.  Patient denies abdominal distention or peripheral edema.    Past Medical  History:   Diagnosis Date    Anxiety     Colon polyp     COVID-19 virus infection 12/08/2021    Symptoms began 12/29.     Depression     Diverticulosis     Hyperlipidemia     Mild protein-calorie malnutrition (HCC) 03/13/2023    Rib fracture 1/26/2021    Uncomplicated alcohol abuse       Past Surgical History:   Procedure Laterality Date    BREAST BIOPSY Right     benign    COLONOSCOPY        Family History   Problem Relation Age of Onset    Breast cancer Mother 65    Alcohol abuse Father     Coronary artery disease Father     No Known Problems Sister     No Known Problems Maternal Grandmother     No Known Problems Maternal Grandfather     No Known Problems Paternal Grandmother     No Known Problems Paternal Grandfather     Alcohol abuse Brother     Bipolar disorder Brother     Alcohol abuse Brother     No Known Problems Maternal Aunt     No Known Problems Maternal Aunt     Cancer Maternal Aunt     No Known Problems Paternal Aunt     No Known Problems Paternal Aunt       Current Outpatient Medications:     aspirin 81 mg chewable tablet, Chew 1 tablet (81 mg total) daily, Disp: 30 tablet, Rfl: 1    atorvastatin (LIPITOR) 10 mg tablet, Take 1 tablet (10 mg total) by mouth daily, Disp: 90 tablet, Rfl: 1    betamethasone, augmented, (DIPROLENE-AF) 0.05 % cream, Apply topically 2 (two) times a day, Disp: 30 g, Rfl: 0    cycloSPORINE (RESTASIS) 0.05 % ophthalmic emulsion, instill 1 drop into both eyes twice a day, Disp: , Rfl:     escitalopram (LEXAPRO) 10 mg tablet, Take 1 tablet (10 mg total) by mouth daily, Disp: 90 tablet, Rfl: 1    estradiol (ESTRACE) 0.1 mg/g vaginal cream, Insert 2 g into the vagina, Disp: , Rfl:     Multiple Vitamins-Minerals (multivitamin with minerals) tablet, Take 1 tablet by mouth daily, Disp: , Rfl:     Sodium Chloride-Xylitol (Xlear Sinus Care Spray) SOLN, into each nostril, Disp: , Rfl:     Zinc Sulfate (ZINC 15 PO), Take by mouth, Disp: , Rfl:     cholecalciferol (VITAMIN D3) 1,000 units  "tablet, Take 1 tablet (1,000 Units total) by mouth daily, Disp: 30 tablet, Rfl: 11     Allergies   Allergen Reactions    Alcohol - Food Allergy Confusion and Delirium     In Recovery    Ibuprofen Diarrhea    Lisinopril Other (See Comments)     Felt some on pressure and dizziness when she was taking the lisinopril after recent hospitalization, October 2024.    Pear - Food Allergy Diarrhea     Diarrhea and colitis    Pravastatin Other (See Comments)     [place] ill    Singulair [Montelukast] Other (See Comments)     Ineffective      Zithromax [Azithromycin] Diarrhea and Rash     Physical Exam:  Vitals:    11/21/24 1041   BP: 124/70   Pulse: 67   SpO2: 99%     Vitals:    11/21/24 1041   Weight: 68.8 kg (151 lb 9.6 oz)     Height: 5' 7\" (170.2 cm)   Body mass index is 23.74 kg/m².    Physical Exam  Vitals and nursing note reviewed.   Constitutional:       General: She is not in acute distress.  HENT:      Head: Normocephalic and atraumatic.      Nose: No congestion.      Mouth/Throat:      Mouth: Mucous membranes are moist.   Eyes:      Conjunctiva/sclera: Conjunctivae normal.   Neck:      Vascular: No carotid bruit.   Cardiovascular:      Rate and Rhythm: Normal rate and regular rhythm.      Heart sounds: S1 normal and S2 normal. No murmur heard.  Pulmonary:      Effort: Pulmonary effort is normal.      Breath sounds: No wheezing, rhonchi or rales.   Abdominal:      General: Abdomen is flat.   Musculoskeletal:         General: No swelling. Normal range of motion.      Comments: Wears compression stockings because she likes the extra support not because of swelling concerns.   Skin:     General: Skin is warm and dry.   Neurological:      Mental Status: She is alert and oriented to person, place, and time.   Psychiatric:         Behavior: Behavior normal.     Data:  Lexiscan nuclear stress test: 10/31/2024  EKG with RBBB.  Left ventricular perfusion defect is small.  It is present in the mid to basal inferolateral walls, " fixed.  Post stress LVEF 58%.  Finding is consistent with old infarct of the mid/basal inferolateral myocardial wall segments.  No evidence of myocardial ischemia.     Echo: 10/31/2024 Mildly dilated LV, LVEF 45%.  Global longitudinal strain reduced at -16.5%. G1DD.  Basal inferolateral, anterolateral and mid anterolateral and apical lateral segments of the wall are hypokinetic.  Mild LAE.  Mild AI.  Mild MR.  Mild TR.  Trace pulmonic valve regurg.     Mikayla Perez PA-C  [facility]

## 2024-11-19 NOTE — PROGRESS NOTES
Name: Jinny Oneil      : 1955      MRN: [id number]  Encounter Provider: Shahid Serrano MD  Encounter Date: 2024   Encounter department: [facility]  :  Assessment & Plan  Atypical chest pain  Stress with fixed defect, no changes per cardio vs before.  Should follow with Cardio again.         Cardiomyopathy, unspecified type (HCC)  Echo did show minor decrease in ejection fraction to 45%.  I did review prior echo from  that said that she had an ejection fraction of 60%.  Would certainly recommend follow-up with cardiology.  Reviewed about the slight reduction in echo estimated ejection fraction.    Reviewed with her about ACE inhibitor's, angiotensin receptor blockers, beta-blockers, and their benefits with regard to heart failure risk reduction and longevity.       Mixed hyperlipidemia  On Lipitor. Will follow in future. Check labs.       Heart valve regurgitation  Minor changes noted on the echo. Follow with Cardio as scheduled.                History of Present Illness     Patient was recently admitted to hospital.  She was at [employer] with [place].  She then developed some heavinees and pressure on the chest, some dizziness with this.  Went to her car, and the pain had resolved some.  Went to the ER after that.  In ER had eval, including labs.  In hospital had testing.  Reviewed stress test with her.  Echo showed some changes as well.    ==============  Hospital Course:   Jinny Oneil is a 69 y.o. female patient who originally presented to the hospital on 10/30/2024 due to  midsternal chest pain described as pressure lasting for approximately 30 minutes.  She sought advice from her friend who is a retired nurse who told her to come to the emergency department.  Her  brought her here and while in the ED she was found to have mildly elevated troponin of 77-38-84 prompting admission and further evaluation.     She was evaluated by cardiology who ordered an  "echocardiogram and nuclear stress test.  The stress test showed no ischemia.  It did show a fixed  defect in the inferior lateral wall which was present since 2020.  Her echocardiogram showed an EF of 45% with stable mild valvular regurgitation.     Due to these findings was started on aspirin 81 mg daily and lisinopril 20 mg daily on discharge.  She will continue Lipitor 10 mg daily.  Cardiology is ordering repeat BMP in 2 weeks and follow-up in a month    ===============      Review of Systems   Constitutional: Negative.    HENT: Negative.     Eyes: Negative.    Respiratory: Negative.     Cardiovascular: Negative.    Gastrointestinal: Negative.    Endocrine: Negative.    Genitourinary: Negative.    Musculoskeletal: Negative.    Skin: Negative.    Allergic/Immunologic: Negative.    Neurological: Negative.    Hematological: Negative.    Psychiatric/Behavioral: Negative.            Objective   /60 (BP Location: Right arm, Patient Position: Sitting, Cuff Size: Standard)   Pulse 58   Ht 5' 7\" (1.702 m)   Wt 69.7 kg (153 lb 9.6 oz)   SpO2 98%   BMI 24.06 kg/m²      Physical Exam  Vitals and nursing note reviewed.   Constitutional:       Appearance: Normal appearance. She is well-developed.   HENT:      Head: Normocephalic and atraumatic.   Cardiovascular:      Rate and Rhythm: Normal rate and regular rhythm.      Pulses:           Carotid pulses are 2+ on the right side and 2+ on the left side.     Heart sounds: Normal heart sounds.   Pulmonary:      Effort: Pulmonary effort is normal.      Breath sounds: Normal breath sounds. No wheezing or rales.   Chest:      Chest wall: No tenderness.   Neurological:      Mental Status: She is alert.         "

## 2024-11-19 NOTE — ASSESSMENT & PLAN NOTE
Stress with fixed defect, no changes per cardio vs before.  Should follow with Cardio again.

## 2024-11-19 NOTE — PATIENT INSTRUCTIONS
1. Atypical chest pain  Assessment & Plan:  Stress with fixed defect, no changes per cardio vs before.  Should follow with Cardio again.         2. Cardiomyopathy, unspecified type (HCC)  Assessment & Plan:  Echo did show minor decrease in ejection fraction to 45%.  I did review prior echo from 2021 that said that she had an ejection fraction of 60%.  Would certainly recommend follow-up with cardiology.  Reviewed about the slight reduction in echo estimated ejection fraction.    Reviewed with her about ACE inhibitor's, angiotensin receptor blockers, beta-blockers, and their benefits with regard to heart failure risk reduction and longevity.       3. Mixed hyperlipidemia  Assessment & Plan:  On Lipitor. Will follow in future. Check labs.       4. Heart valve regurgitation  Assessment & Plan:  Minor changes noted on the echo. Follow with Cardio as scheduled.             COVID 19 Instructions    Jinny Oneil was advised to limit contact with others to essential tasks such as getting food, medications, and medical care.    Proper handwashing reviewed, and Hand sanitzer when washing is not available.    If the patient develops symptoms of COVID 19, the patient should call the office as soon as possible.    It is strongly recommended that Flu Vaccinations be obtained.      Virtual Visits:  Yue: This works on smart phones (any phone with Internet browsing capability).  You should get a text message when the provider is ready to see you.  Click on the link in the text message, and the call should start.  You will need to type in your name, and allow camera and microphone access.  This is HIPPA compliant, and secure.      If you have not already done so, get immunized to COVID 19.      We are committed to getting you vaccinated as soon as possible and will be closely following CDC and [place] ROBIN guidelines as they are released and revised.  Please refer to our COVID-19 vaccine webpage for the most up to date  information on the vaccine and our distribution efforts.    This site will also have the most up to date recommendations for COVID booster vaccine.    [email or web address]    Call 0-109-[phone] ([phone]), option 7    You can also visit https://www.vaccines.gov/ to find vaccines in your area.    OUR LOCATION:    [facility]  [address] [place] [address], [address]  [place], [place], [address] [phone]  Fax: [phone]    Lab services, Rheumatology, and OB/GYN are at this location as well.

## 2024-11-21 ENCOUNTER — OFFICE VISIT (OUTPATIENT)
Dept: CARDIOLOGY CLINIC | Facility: CLINIC | Age: 69
End: 2024-11-21

## 2024-11-21 VITALS
SYSTOLIC BLOOD PRESSURE: 124 MMHG | WEIGHT: 151.6 LBS | BODY MASS INDEX: 23.79 KG/M2 | HEART RATE: 67 BPM | HEIGHT: 67 IN | DIASTOLIC BLOOD PRESSURE: 70 MMHG | OXYGEN SATURATION: 99 %

## 2024-11-21 DIAGNOSIS — I42.9 CARDIOMYOPATHY, UNSPECIFIED TYPE (HCC): Primary | ICD-10-CM

## 2024-11-21 DIAGNOSIS — I45.10 RBBB: ICD-10-CM

## 2024-11-21 DIAGNOSIS — I65.29 STENOSIS OF CAROTID ARTERY, UNSPECIFIED LATERALITY: ICD-10-CM

## 2024-11-21 DIAGNOSIS — R00.1 SINUS BRADYCARDIA: ICD-10-CM

## 2024-11-21 DIAGNOSIS — I38 HEART VALVE REGURGITATION: ICD-10-CM

## 2024-12-18 ENCOUNTER — OFFICE VISIT (OUTPATIENT)
Dept: PODIATRY | Facility: CLINIC | Age: 69
End: 2024-12-18
Payer: MEDICARE

## 2024-12-18 VITALS
DIASTOLIC BLOOD PRESSURE: 81 MMHG | WEIGHT: 150 LBS | HEART RATE: 65 BPM | BODY MASS INDEX: 23.54 KG/M2 | RESPIRATION RATE: 18 BRPM | HEIGHT: 67 IN | SYSTOLIC BLOOD PRESSURE: 126 MMHG

## 2024-12-18 DIAGNOSIS — L85.3 XEROSIS CUTIS: Primary | ICD-10-CM

## 2024-12-18 PROCEDURE — 99212 OFFICE O/P EST SF 10 MIN: CPT | Performed by: PODIATRIST

## 2024-12-18 NOTE — PROGRESS NOTES
Patient presents for pedal assessment.  Overall, even though patient has bilateral hallux valgus deformities, she is doing well with minimal discomfort.  Skin on the soles of both feet is extremely dry and fissured prone.  Urged her to utilize a moisturizer such as Goldbond healing on a twice daily basis to prevent fissures from forming..    Hyperkeratotic lesion at fifth metatarsal base left foot was trimmed.    Left great toenail is dystrophic but do not recommend oral antifungals.  Patient told to avoid keeping nail polish on for extended periods.  Reappoint as needed

## 2025-01-09 ENCOUNTER — OFFICE VISIT (OUTPATIENT)
Dept: GASTROENTEROLOGY | Facility: CLINIC | Age: 70
End: 2025-01-09
Payer: MEDICARE

## 2025-01-09 ENCOUNTER — APPOINTMENT (OUTPATIENT)
Dept: LAB | Facility: MEDICAL CENTER | Age: 70
End: 2025-01-09
Payer: MEDICARE

## 2025-01-09 VITALS
TEMPERATURE: 97 F | BODY MASS INDEX: 24.17 KG/M2 | DIASTOLIC BLOOD PRESSURE: 62 MMHG | HEIGHT: 67 IN | SYSTOLIC BLOOD PRESSURE: 106 MMHG | WEIGHT: 154 LBS

## 2025-01-09 DIAGNOSIS — R10.31 BILATERAL LOWER ABDOMINAL DISCOMFORT: ICD-10-CM

## 2025-01-09 DIAGNOSIS — R10.32 BILATERAL LOWER ABDOMINAL DISCOMFORT: ICD-10-CM

## 2025-01-09 DIAGNOSIS — R14.0 BLOATING: ICD-10-CM

## 2025-01-09 DIAGNOSIS — K58.2 IRRITABLE BOWEL SYNDROME WITH BOTH CONSTIPATION AND DIARRHEA: Primary | ICD-10-CM

## 2025-01-09 DIAGNOSIS — Z86.0100 HISTORY OF COLON POLYPS: ICD-10-CM

## 2025-01-09 PROCEDURE — 99214 OFFICE O/P EST MOD 30 MIN: CPT | Performed by: PHYSICIAN ASSISTANT

## 2025-01-09 PROCEDURE — 87338 HPYLORI STOOL AG IA: CPT

## 2025-01-09 NOTE — ASSESSMENT & PLAN NOTE
I had a long discussion with the patient in the office today.  We discussed that her symptoms are very consistent with IBS. I provided education on IBS. She at baseline eats a diet very high in FOD MAPs.  Thankfully she is not having any alarming symptoms today.  She is up-to-date on colonoscopy/CRC screening.    Recommended and educated patient on LOW FOD MAP diet. Handouts/ educational resources provided. I recommended she continue to stay away from diet soda, limit raw veggies, honey, dairy. We also discussed trial of GF diet as well. We also discussed and I recommended she keep a  food and symptom diary. She expressed understanding  I provided reassurance. We will test stool for H pylori and plan for both pelvic and abdominal US for completeness.   All questions were answered.  Patient was appreciative of visit today.          Skin normal color for race, warm, dry and intact. No evidence of rash.

## 2025-01-09 NOTE — PROGRESS NOTES
Name: Jinny Oneil      : 1955      MRN: [id number]  Encounter Provider: Yaquelin Stevens PA-C  Encounter Date: 2025   Encounter department: [facility] [place]  :  Assessment & Plan  Irritable bowel syndrome with both constipation and diarrhea  I had a long discussion with the patient in the office today.  We discussed that her symptoms are very consistent with IBS. I provided education on IBS. She at baseline eats a diet very high in FOD MAPs.  Thankfully she is not having any alarming symptoms today.  She is up-to-date on colonoscopy/CRC screening.    Recommended and educated patient on LOW FOD MAP diet. Handouts/ educational resources provided. I recommended she continue to stay away from diet soda, limit raw veggies, honey, dairy. We also discussed trial of GF diet as well. We also discussed and I recommended she keep a  food and symptom diary. She expressed understanding  I provided reassurance. We will test stool for H pylori and plan for both pelvic and abdominal US for completeness.   All questions were answered.  Patient was appreciative of visit today.         Bilateral lower abdominal discomfort  As above.   Orders:    US pelvis complete w transvaginal; Future    US abdomen complete; Future    H. pylori antigen, stool; Future    Bloating  As above.   Orders:    US pelvis complete w transvaginal; Future    US abdomen complete; Future    H. pylori antigen, stool; Future    History of colon polyps  Patient will be due for repeat colonoscopy for CRC screening/surveillance in 2028.       Patient was instructed to call the office with any questions, concerns, new/ worsening/ persisting GI symptoms. Advised patient go to the ER with any severe or worsening abdominal pain, fevers/ chills, intractable N/V, chest pain, SOB, dizziness, lightheadedness, feeling something stuck in esophagus that will not go down. Patient expressed understanding and is in  agreement with treatment plan.     Will plan to follow up  in a few months     History of Present Illness     Jinny Oneil is a 69 y.o. female with a past medical history of cardiomyopathy, allergies, history of acid reflux, diagnosis of IBS, history of lymphocytic colitis, history of bipolar 1 disorder, history of alcohol abuse, osteoarthritis, hyperlipidemia, vitamin D deficiency who presents to the office today for follow-up.  Patient was last seen in the office by Dr. Soriano 3/13/2023, previous office note was reviewed.    Patient presents with CC of abdominal bloating and lower abdominal discomfort x several weeks.  She notes in the past she has tried LOW FOD MAP diet and felt great however she does not do this as much anymore/ she got away from it. She does report at baseline eating a lot of ice cream, honey, asparagus, broccoli, and diet soda daily. She switched to almond-milk based iced cream, unsure if helping. She has also cut out diet soda x 1 week and she notes she is feeling much better.   She has also been using essential oils- rubbing them on her lower abdomen (they contain peppermint oil) with significant relief  Patient's weight is up 10 pounds since last office visit.  She has baseline occasional bowel irregularity but she notes since he cut out diet soda her bowel movements are much more formed, regular.  She denies diarrhea today.  Patient denies any current or recent  fevers/ chills, decreased appetite, nausea, vomiting,  black or bloody stools, heartburn, dysphagia, odynophagia.   Denies chest pain, SOB    Patient's last colonoscopy was 2/22/2023, this was reviewed, this showed some mild localized erythema in the sigmoid colon, small internal hemorrhoids as well as diverticulosis.  The colon was otherwise normal.  Random colon biopsies were consistent with lymphocytic colitis.  It was recommended patient undergo repeat colonoscopy in 5 years given personal history of colon polyps.    She  "tested NEGATIVE for celiac disease in 2022, this lab test was reviewed    Review of Systems   Constitutional:  Negative for chills and fever.   HENT:  Negative for ear pain and sore throat.    Eyes:  Negative for pain and visual disturbance.   Respiratory:  Negative for cough and shortness of breath.    Cardiovascular:  Negative for chest pain and palpitations.   Gastrointestinal:  Positive for abdominal pain and constipation. Negative for vomiting.   Genitourinary:  Negative for dysuria and hematuria.   Musculoskeletal:  Negative for arthralgias and back pain.   Skin:  Negative for color change and rash.   Neurological:  Negative for seizures and syncope.   All other systems reviewed and are negative.    Objective   /62   Temp (!) 97 °F (36.1 °C)   Ht 5' 7\" (1.702 m)   Wt 69.9 kg (154 lb)   BMI 24.12 kg/m²      Physical Exam  Vitals reviewed.   Constitutional:       General: She is not in acute distress.     Appearance: She is not toxic-appearing.   HENT:      Head: Normocephalic and atraumatic.   Eyes:      Extraocular Movements: Extraocular movements intact.      Conjunctiva/sclera: Conjunctivae normal.   Cardiovascular:      Rate and Rhythm: Normal rate and regular rhythm.   Pulmonary:      Effort: Pulmonary effort is normal. No respiratory distress.      Breath sounds: Normal breath sounds.   Abdominal:      General: Bowel sounds are normal.      Palpations: Abdomen is soft.      Tenderness: There is abdominal tenderness (lower abdomen minimal).   Musculoskeletal:         General: No swelling or tenderness.      Cervical back: Normal range of motion and neck supple.   Skin:     General: Skin is warm and dry.      Coloration: Skin is not jaundiced.   Neurological:      General: No focal deficit present.      Mental Status: She is alert and oriented to person, place, and time. Mental status is at baseline.   Psychiatric:         Mood and Affect: Mood normal.         Behavior: Behavior normal.         " Thought Content: Thought content normal.       Lab Results   Component Value Date    WBC 5.07 10/31/2024    HGB 13.2 10/31/2024    HCT 38.7 10/31/2024    MCV 98 10/31/2024     10/31/2024     Lab Results   Component Value Date     04/01/2015    SODIUM 139 11/08/2024    K 4.2 11/08/2024     11/08/2024    CO2 28 11/08/2024    ANIONGAP 8 04/01/2015    AGAP 7 11/08/2024    BUN 15 11/08/2024    CREATININE 0.60 11/08/2024    GLUC 91 10/31/2024    GLUF 81 11/08/2024    CALCIUM 8.8 11/08/2024    AST 19 10/30/2024    ALT 13 10/30/2024    ALKPHOS 35 10/30/2024    PROT 6.8 04/01/2015    TP 5.7 (L) 10/30/2024    BILITOT 0.75 04/01/2015    TBILI 0.49 10/30/2024    EGFR 91 11/08/2024     Lab Results   Component Value Date    WOH9AHGDGKEZ 2.390 03/16/2019

## 2025-01-10 ENCOUNTER — RESULTS FOLLOW-UP (OUTPATIENT)
Dept: GASTROENTEROLOGY | Facility: CLINIC | Age: 70
End: 2025-01-10

## 2025-01-10 LAB — H PYLORI AG STL QL IA: NEGATIVE

## 2025-01-13 ENCOUNTER — TELEPHONE (OUTPATIENT)
Age: 70
End: 2025-01-13

## 2025-01-13 NOTE — TELEPHONE ENCOUNTER
Pt called stated she is scheduled to have us abdomen and ultrasound pelv on the same time and it is impossible to prep for both test. Transferred over to central scheduling for further assistance

## 2025-01-15 ENCOUNTER — TELEPHONE (OUTPATIENT)
Dept: GASTROENTEROLOGY | Facility: CLINIC | Age: 70
End: 2025-01-15

## 2025-01-15 NOTE — TELEPHONE ENCOUNTER
Spoke with patient, she called Central Scheduling and made the ultra sound appointments for 2 different days.

## 2025-01-22 ENCOUNTER — HOSPITAL ENCOUNTER (OUTPATIENT)
Dept: ULTRASOUND IMAGING | Facility: MEDICAL CENTER | Age: 70
Discharge: HOME | End: 2025-01-22
Payer: MEDICARE

## 2025-01-22 DIAGNOSIS — R14.0 BLOATING: ICD-10-CM

## 2025-01-22 DIAGNOSIS — R10.32 BILATERAL LOWER ABDOMINAL DISCOMFORT: ICD-10-CM

## 2025-01-22 DIAGNOSIS — R10.31 BILATERAL LOWER ABDOMINAL DISCOMFORT: ICD-10-CM

## 2025-01-22 PROCEDURE — 76700 US EXAM ABDOM COMPLETE: CPT

## 2025-02-07 ENCOUNTER — HOSPITAL ENCOUNTER (OUTPATIENT)
Dept: ULTRASOUND IMAGING | Facility: MEDICAL CENTER | Age: 70
Discharge: HOME | End: 2025-02-07
Payer: MEDICARE

## 2025-02-07 DIAGNOSIS — R10.31 BILATERAL LOWER ABDOMINAL DISCOMFORT: ICD-10-CM

## 2025-02-07 DIAGNOSIS — R10.32 BILATERAL LOWER ABDOMINAL DISCOMFORT: ICD-10-CM

## 2025-02-07 DIAGNOSIS — R14.0 BLOATING: ICD-10-CM

## 2025-02-07 PROCEDURE — 76830 TRANSVAGINAL US NON-OB: CPT

## 2025-02-07 PROCEDURE — 76856 US EXAM PELVIC COMPLETE: CPT

## 2025-02-13 ENCOUNTER — TELEPHONE (OUTPATIENT)
Age: 70
End: 2025-02-13

## 2025-02-13 NOTE — TELEPHONE ENCOUNTER
Pt already has labs in her chart to complete on 2/23/25 and so she requested to have them mailed to her and so I did

## 2025-02-13 NOTE — TELEPHONE ENCOUNTER
Last visit 11/19/2024  Next appt 03/20/2025    Patient would like to know if she is due for labs before her upcoming appt on 03/20. Please contact patient at [phone]. Please advise.

## 2025-02-13 NOTE — TELEPHONE ENCOUNTER
FYI: Patient stated to mail lab scripts as discussed, but she changed her appt for April and will wait to go for blood work until then.

## 2025-02-21 NOTE — TELEPHONE ENCOUNTER
"Patient contacted the office this afternoon in regards to lab work orders she received in the mail. Labs from 08/23/24, had expected date of 02/23/25, was questioning if this should be done urgently. Relayed as this was ordered back in August, and per PCP note in previous message \"Does not appear to be due for labs until April.\" Does not need to complete urgently and can wait until closer to appt in April.      "

## 2025-02-26 ENCOUNTER — TELEPHONE (OUTPATIENT)
Age: 70
End: 2025-02-26

## 2025-02-26 NOTE — TELEPHONE ENCOUNTER
Contacted patient off of [facility]  wait list to verify needs of services in attempts to update list with patient preferences. spoke with patient whom stated is interested in services and verify information. Writer updated LW preferences to [place] office ([place]). Pt understood will be contacted upon availability for scheduling.

## 2025-03-04 ENCOUNTER — OFFICE VISIT (OUTPATIENT)
Dept: FAMILY MEDICINE CLINIC | Facility: CLINIC | Age: 70
End: 2025-03-04
Payer: MEDICARE

## 2025-03-04 VITALS
WEIGHT: 150.4 LBS | SYSTOLIC BLOOD PRESSURE: 106 MMHG | DIASTOLIC BLOOD PRESSURE: 70 MMHG | HEIGHT: 67 IN | OXYGEN SATURATION: 97 % | RESPIRATION RATE: 18 BRPM | TEMPERATURE: 97.6 F | HEART RATE: 79 BPM | BODY MASS INDEX: 23.61 KG/M2

## 2025-03-04 DIAGNOSIS — J18.9 COMMUNITY ACQUIRED PNEUMONIA OF LEFT LOWER LOBE OF LUNG: Primary | ICD-10-CM

## 2025-03-04 DIAGNOSIS — I42.9 CARDIOMYOPATHY, UNSPECIFIED TYPE (HCC): ICD-10-CM

## 2025-03-04 DIAGNOSIS — F31.9 BIPOLAR 1 DISORDER (HCC): ICD-10-CM

## 2025-03-04 DIAGNOSIS — F10.21 RECOVERING ALCOHOLIC IN REMISSION (HCC): ICD-10-CM

## 2025-03-04 PROCEDURE — G2211 COMPLEX E/M VISIT ADD ON: HCPCS | Performed by: FAMILY MEDICINE

## 2025-03-04 PROCEDURE — 99214 OFFICE O/P EST MOD 30 MIN: CPT | Performed by: FAMILY MEDICINE

## 2025-03-04 RX ORDER — DOXYCYCLINE 100 MG/1
100 CAPSULE ORAL EVERY 12 HOURS SCHEDULED
Qty: 20 CAPSULE | Refills: 0 | Status: SHIPPED | OUTPATIENT
Start: 2025-03-04 | End: 2025-03-14 | Stop reason: ALTCHOICE

## 2025-03-04 NOTE — PROGRESS NOTES
Name: Jinny Oneil      : 1955      MRN: [id number]  Encounter Provider: Shahid Serrano MD  Encounter Date: 3/4/2025   Encounter department: [facility]  :  Assessment & Plan  Community acquired pneumonia of left lower lobe of lung    Orders:  •  doxycycline hyclate (VIBRAMYCIN) 100 mg capsule; Take 1 capsule (100 mg total) by mouth every 12 (twelve) hours for 10 days    Bipolar 1 disorder (HCC)  Stable. Continue Lexapro.  No changes.         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 Staying off alcohol.         Cardiomyopathy, unspecified type (HCC)  Noted in past. No changes.                  1. Community acquired pneumonia of left lower lobe of lung  Comments:  Doxy due to Zithromax allergy.  Orders:  -     doxycycline hyclate (VIBRAMYCIN) 100 mg capsule; Take 1 capsule (100 mg total) by mouth every 12 (twelve) hours for 10 days  2. Bipolar 1 disorder (HCC)  Assessment & Plan:  Stable. Continue Lexapro.  No changes.         3. 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 Staying off alcohol.         4. Cardiomyopathy, unspecified type (HCC)  Assessment & Plan:  Noted in past. No changes.             Chief Complaint   Patient presents with   • Cold Like Symptoms     Patient in office for ear ache, swollen glands, head cold,scratchy throat, cough, chills and diarrhea onset a week ago. Ssx started as sinus issue but sxs are not improving.         History of Present Illness   Please see chief complaint.    Improved diarrhea now.  That was 2 days ago.          Review of Systems   Constitutional:  Positive for chills. Negative for appetite change, diaphoresis, fatigue and fever.   HENT:  Positive for ear pain and sore throat. Negative for congestion, facial swelling, postnasal drip, rhinorrhea, sinus pressure, sinus pain, trouble swallowing and voice change.    Respiratory:  Positive for cough. Negative for chest tightness, shortness of  "breath and wheezing.    Cardiovascular:  Negative for chest pain, palpitations and leg swelling.   Gastrointestinal:  Positive for diarrhea. Negative for abdominal pain, nausea and vomiting.   Genitourinary:  Negative for difficulty urinating.   Musculoskeletal:  Negative for arthralgias and myalgias.   Skin:  Negative for wound.   Neurological:  Negative for dizziness, light-headedness and headaches.   Psychiatric/Behavioral:  Negative for dysphoric mood and sleep disturbance. The patient is not nervous/anxious.        Objective   /70 (BP Location: Left arm, Patient Position: Sitting, Cuff Size: Adult)   Pulse 79   Temp 97.6 °F (36.4 °C) (Temporal)   Resp 18   Ht 5' 7\" (1.702 m)   Wt 68.2 kg (150 lb 6.4 oz)   SpO2 97%   BMI 23.56 kg/m²      Physical Exam  Vitals and nursing note reviewed.   Constitutional:       Appearance: Normal appearance. She is well-developed.   HENT:      Head: Normocephalic and atraumatic.   Cardiovascular:      Rate and Rhythm: Normal rate and regular rhythm.      Pulses:           Carotid pulses are 2+ on the right side and 2+ on the left side.     Heart sounds: Normal heart sounds.   Pulmonary:      Effort: Pulmonary effort is normal.      Breath sounds: Normal breath sounds. No wheezing or rales.       Chest:      Chest wall: No tenderness.   Neurological:      Mental Status: She is alert.         "

## 2025-03-04 NOTE — PATIENT INSTRUCTIONS
1. Community acquired pneumonia of left lower lobe of lung  Comments:  Doxy due to Zithromax allergy.  Orders:  -     doxycycline hyclate (VIBRAMYCIN) 100 mg capsule; Take 1 capsule (100 mg total) by mouth every 12 (twelve) hours for 10 days  2. Bipolar 1 disorder (HCC)  Assessment & Plan:  Stable. Continue Lexapro.  No changes.         3. 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 Staying off alcohol.         4. Cardiomyopathy, unspecified type (HCC)  Assessment & Plan:  Noted in past. No changes.             COVID 19 Instructions    Jinny Oneil was advised to limit contact with others to essential tasks such as getting food, medications, and medical care.    Proper handwashing reviewed, and Hand sanitzer when washing is not available.    If the patient develops symptoms of COVID 19, the patient should call the office as soon as possible.    It is strongly recommended that Flu Vaccinations be obtained.      Virtual Visits:  AmWell: This works on smart phones (any phone with Internet browsing capability).  You should get a text message when the provider is ready to see you.  Click on the link in the text message, and the call should start.  You will need to type in your name, and allow camera and microphone access.  This is HIPPA compliant, and secure.      If you have not already done so, get immunized to COVID 19.      We are committed to getting you vaccinated as soon as possible and will be closely following CDC and [facility] guidelines as they are released and revised.  Please refer to our COVID-19 vaccine webpage for the most up to date information on the vaccine and our distribution efforts.    This site will also have the most up to date recommendations for COVID booster vaccine.    https://[email or web address].org/covid-19/protect-yourself/covid-19-vaccine    Call [phone] ([phone]), option 7    You can also visit https://www.vaccines.gov/ to find vaccines in your  area.    OUR LOCATION:    [address], [address]  [place], [address] [phone]  Fax: [phone]    Lab services, Rheumatology, and OB/GYN are at this location as well.

## 2025-03-07 ENCOUNTER — RA CDI HCC (OUTPATIENT)
Dept: OTHER | Facility: HOSPITAL | Age: 70
End: 2025-03-07

## 2025-03-07 NOTE — PROGRESS NOTES
HCC coding opportunities       Chart reviewed, no opportunity found: CHART REVIEWED, NO OPPORTUNITY FOUND        Patients Insurance     Medicare Insurance: Medicare           BMP/CBC/COVID-19

## 2025-03-14 ENCOUNTER — OFFICE VISIT (OUTPATIENT)
Dept: FAMILY MEDICINE CLINIC | Facility: CLINIC | Age: 70
End: 2025-03-14
Payer: MEDICARE

## 2025-03-14 VITALS
BODY MASS INDEX: 23.56 KG/M2 | OXYGEN SATURATION: 99 % | DIASTOLIC BLOOD PRESSURE: 60 MMHG | HEART RATE: 67 BPM | SYSTOLIC BLOOD PRESSURE: 110 MMHG | HEIGHT: 67 IN

## 2025-03-14 DIAGNOSIS — J18.9 COMMUNITY ACQUIRED PNEUMONIA OF LEFT LOWER LOBE OF LUNG: Primary | ICD-10-CM

## 2025-03-14 DIAGNOSIS — J30.1 SEASONAL ALLERGIC RHINITIS DUE TO POLLEN: ICD-10-CM

## 2025-03-14 PROCEDURE — 99214 OFFICE O/P EST MOD 30 MIN: CPT | Performed by: FAMILY MEDICINE

## 2025-03-14 PROCEDURE — G2211 COMPLEX E/M VISIT ADD ON: HCPCS | Performed by: FAMILY MEDICINE

## 2025-03-14 NOTE — PROGRESS NOTES
"Name: Jinny Oneil      : 1955      MRN: [id number]  Encounter Provider: Shahid Serrano MD  Encounter Date: 3/14/2025   Encounter department: [facility] beds at the [facility]  :  Assessment & Plan  Community acquired pneumonia of left lower lobe of lung         Seasonal allergic rhinitis due to pollen  Some allergy symptoms.  Recommend use OTC treatments.  Loratadine, etc.   Xlear OK to use.               1. Community acquired pneumonia of left lower lobe of lung  Comments:  Improved after Doxy. Follow in future as needed.  2. Seasonal allergic rhinitis due to pollen  Assessment & Plan:  Some allergy symptoms.  Recommend use OTC treatments.  Loratadine, etc.   Xlear OK to use.               Chief Complaint   Patient presents with   • Follow-up     1 week fu - scratch in throat, earache, runny nose fuzzy head feeling. Completed all of antibiotics. Took last dose this morning             History of Present Illness   Patient is feeling better today than before.  Did finish her antibiotics.  She just wants to make sure that her lungs are doing better at this point.    Has been having allergy symptoms in past as well.        Review of Systems   Constitutional:  Positive for fatigue.   HENT:  Positive for congestion.    Respiratory: Negative.     Cardiovascular: Negative.    Gastrointestinal: Negative.        Objective   /60 (BP Location: Right arm, Patient Position: Sitting, Cuff Size: Standard)   Pulse 67   Ht 5' 7\" (1.702 m)   SpO2 99%   BMI 23.56 kg/m²      Physical Exam  Vitals and nursing note reviewed.   Constitutional:       Appearance: Normal appearance. She is well-developed.   HENT:      Head: Normocephalic and atraumatic.   Cardiovascular:      Rate and Rhythm: Normal rate and regular rhythm.      Pulses:           Carotid pulses are 2+ on the right side and 2+ on the left side.     Heart sounds: Normal heart sounds.   Pulmonary:      Effort: Pulmonary effort is normal.      " Breath sounds: Normal breath sounds. No wheezing or rales.      Comments: No egophony noted today  Chest:      Chest wall: No tenderness.   Neurological:      Mental Status: She is alert.

## 2025-03-14 NOTE — PATIENT INSTRUCTIONS
1. Community acquired pneumonia of left lower lobe of lung  Comments:  Improved after Doxy. Follow in future as needed.  2. Seasonal allergic rhinitis due to pollen  Assessment & Plan:  Some allergy symptoms.  Recommend use OTC treatments.  Loratadine, etc.   Xlear OK to use.               COVID 19 Instructions    Jinny Oneil was advised to limit contact with others to essential tasks such as getting food, medications, and medical care.    Proper handwashing reviewed, and Hand sanitzer when washing is not available.    If the patient develops symptoms of COVID 19, the patient should call the office as soon as possible.    It is strongly recommended that Flu Vaccinations be obtained.      Virtual Visits:  AmWell: This works on smart phones (any phone with Internet browsing capability).  You should get a text message when the provider is ready to see you.  Click on the link in the text message, and the call should start.  You will need to type in your name, and allow camera and microphone access.  This is HIPPA compliant, and secure.      If you have not already done so, get immunized to COVID 19.      We are committed to getting you vaccinated as soon as possible and will be closely following CDC and [facility] guidelines as they are released and revised.  Please refer to our COVID-19 vaccine webpage for the most up to date information on the vaccine and our distribution efforts.    This site will also have the most up to date recommendations for COVID booster vaccine.    https://[email or web address].org/covid-19/protect-yourself/covid-[email or web address]-vaccine    Call [phone] ([phone]), option 7    You can also visit https://www.vaccines.gov/ to find vaccines in your area.    OUR LOCATION:    [facility]  [facility], [address]  [place], [address] [phone]  Fax: [phone]    Lab services, Rheumatology, and OB/GYN are at this location as well.

## 2025-03-15 ENCOUNTER — OFFICE VISIT (OUTPATIENT)
Dept: URGENT CARE | Facility: CLINIC | Age: 70
End: 2025-03-15
Payer: MEDICARE

## 2025-03-15 VITALS
TEMPERATURE: 98 F | HEART RATE: 81 BPM | OXYGEN SATURATION: 98 % | RESPIRATION RATE: 17 BRPM | DIASTOLIC BLOOD PRESSURE: 76 MMHG | SYSTOLIC BLOOD PRESSURE: 124 MMHG
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
## 2025-03-15 DIAGNOSIS — S00.96XA TICK BITE OF HEAD, UNSPECIFIED PART, INITIAL ENCOUNTER: Primary | ICD-10-CM

## 2025-03-15 DIAGNOSIS — S00.85XA FOREIGN BODY IN SKIN OF FACE: ICD-10-CM

## 2025-03-15 PROCEDURE — 10120 INC&RMVL FB SUBQ TISS SMPL: CPT | Performed by: PHYSICIAN ASSISTANT

## 2025-03-15 PROCEDURE — 99212 OFFICE O/P EST SF 10 MIN: CPT | Performed by: PHYSICIAN ASSISTANT

## 2025-03-15 PROCEDURE — G0463 HOSPITAL OUTPT CLINIC VISIT: HCPCS | Performed by: PHYSICIAN ASSISTANT

## 2025-03-15 RX ORDER — DOXYCYCLINE 100 MG/1
100 TABLET ORAL ONCE
Qty: 2 TABLET | Refills: 0 | Status: SHIPPED | OUTPATIENT
Start: 2025-03-15 | End: 2025-03-15

## 2025-03-15 NOTE — PROGRESS NOTES
[facility]    NAME: Jinny Oneil is a 69 y.o. female  : 1955    MRN: [id number]  DATE: March 15, 2025  TIME: 9:06 AM    Assessment and Plan   Tick bite of head, unspecified part, initial encounter [S00.96XA, W57.XXXA]  1. Tick bite of head, unspecified part, initial encounter  doxycycline (ADOXA) 100 MG tablet      2. Foreign body in skin of face            Patient Instructions     Patient Instructions   Take antibiotic today.  Do not take your vitamins today.    Follow up with PCP as needed.    Chief Complaint     Chief Complaint   Patient presents with    Tick Bite     Tick underneath L eye. Yesterday was walking the dog & she noticed the tick last evening.        History of Present Illness   Jinny Oneil presents to the clinic c/o  Patient comes in after noting tick under her left eye.  Thinks she acquired it walking the dog yesterday.  Went by bushes and brushed up against.    Just got off antibiotics for PNA.    Noted tiny spot last night under L. Eye.  Has grown.  Noted tick but could not get out.      Tick Bite        Review of Systems   Review of Systems   Constitutional: Negative.    Skin:  Positive for color change and wound.       Current Medications     Long-Term Medications   Medication Sig Dispense Refill    aspirin 81 mg chewable tablet Chew 1 tablet (81 mg total) daily 30 tablet 1    atorvastatin (LIPITOR) 10 mg tablet Take 1 tablet (10 mg total) by mouth daily 90 tablet 1    betamethasone, augmented, (DIPROLENE-AF) 0.05 % cream Apply topically 2 (two) times a day 30 g 0    cholecalciferol (VITAMIN D3) 1,000 units tablet Take 1 tablet (1,000 Units total) by mouth daily 30 tablet 11    cycloSPORINE (RESTASIS) 0.05 % ophthalmic emulsion instill 1 drop into both eyes twice a day      escitalopram (LEXAPRO) 10 mg tablet Take 1 tablet (10 mg total) by mouth daily 90 tablet 1    Multiple Vitamins-Minerals (multivitamin with minerals) tablet Take 1 tablet by mouth daily      Sodium  Chloride-Xylitol (Xlear Sinus Care Spray) SOLN into each nostril         Current Allergies     Allergies as of 03/15/2025 - Reviewed 03/15/2025   Allergen Reaction Noted    Alcohol - food allergy Confusion and Delirium 04/19/2019    Ibuprofen Diarrhea 01/10/2024    Lisinopril Other (See Comments) 11/19/2024    Pear - food allergy Diarrhea 01/10/2024    Pravastatin Other (See Comments) 10/04/2019    Singulair [montelukast] Other (See Comments) 06/30/2022    Zithromax [azithromycin] Diarrhea and Rash 10/08/2019          The following portions of the patient's history were reviewed and updated as appropriate: allergies, current medications, past family history, past medical history, past social history, past surgical history and problem list.  Past Medical History:   Diagnosis Date    Anxiety     Colon polyp     COVID-19 virus infection 12/08/2021    Symptoms began 12/29.     Depression     Diverticulosis     Hyperlipidemia     Mild protein-calorie malnutrition (HCC) 03/13/2023    Rib fracture 1/26/2021    Uncomplicated alcohol abuse      Past Surgical History:   Procedure Laterality Date    BREAST BIOPSY Right     benign    COLONOSCOPY       Family History   Problem Relation Age of Onset    Breast cancer Mother 65    Alcohol abuse Father     Coronary artery disease Father     No Known Problems Sister     No Known Problems Maternal Grandmother     No Known Problems Maternal Grandfather     No Known Problems Paternal Grandmother     No Known Problems Paternal Grandfather     Alcohol abuse Brother     Bipolar disorder Brother     Alcohol abuse Brother     No Known Problems Maternal Aunt     No Known Problems Maternal Aunt     Cancer Maternal Aunt     No Known Problems Paternal Aunt     No Known Problems Paternal Aunt        Objective   /76   Pulse 81   Temp 98 °F (36.7 °C) (Tympanic)   Resp 17   SpO2 98%   No LMP recorded. Patient is postmenopausal.       Physical Exam     Physical Exam  Vitals and nursing note  reviewed.   Constitutional:       General: She is not in acute distress.     Appearance: She is well-developed. She is not ill-appearing, toxic-appearing or diaphoretic.   Cardiovascular:      Rate and Rhythm: Normal rate.   Pulmonary:      Effort: Pulmonary effort is normal. No respiratory distress.   Skin:     General: Skin is warm and dry.      Findings: Erythema present.      Comments: 2 mm tick attached to skin under OD medial canthus.   Neurological:      Mental Status: She is alert and oriented to person, place, and time.   Psychiatric:         Mood and Affect: Mood normal.         Behavior: Behavior normal.         Universal Protocol:  procedure performed by consultantConsent given by: patient  Patient understanding: patient states understanding of the procedure being performed  Patient identity confirmed: verbally with patient  Foreign body removal    Date/Time: 3/15/2025 9:00 AM    Performed by: Lizzy Shepherd PA-C  Authorized by: Lizzy Shepherd PA-C  Body area: skin  Complexity: simple  1 objects recovered.  Objects recovered: tick  Post-procedure assessment: foreign body removed  Patient tolerance: patient tolerated the procedure well with no immediate complications

## 2025-04-23 ENCOUNTER — TELEPHONE (OUTPATIENT)
Age: 70
End: 2025-04-23

## 2025-04-23 NOTE — TELEPHONE ENCOUNTER
Patients GI provider:  Rodney    Number to return call: (e: [phone]     Reason for call: Pt calling to cancel appt wanted PA Rodney to know she stopped drinking soda and is feeling better.     Scheduled procedure/appointment date if applicable: Apt/procedure

## 2025-05-14 NOTE — PROGRESS NOTES
Cardiology Follow Up    [facility] - [address] [address]  PHONE: [phone]  FAX: [phone]    Jinny Oneil  1955  [id number]    Assessment/Plan:  Atypical chest pain  CAD, prior mid to basal inferolateral infarction without ischemia on nuclear stress test, 10/31/24  Mild ischemic cardiomyopathy, present since 2020  Pre-HF, reduced EF, LVEF 45%, LVIDd 5.5 cm, NYHA Class I, AHA Stage B  Dyslipidemia  Chronic right bundle branch block  Sinus bradycardia  Elevated blood pressure reading    She has elevated BP reading today. Perhaps related to emotional stress, late night and 2 shots of espresso this morning. I asked her to please consider buying a BP cuff either Omron series 3 or Equate 4000 to check BP occasionally at home.    She is having few 1 min long chest discomfort which is not related to exertion. I doubt this is angina. Possibly heart burn or related to BP?    No need for repeat ischemic testing at this time.  Lexiscan nuclear stress test was without ischemia last October.    Continue exercise - walking the dog thru [facility] or join  at his new gym. Monitor for exertional chest pains and make a log.    RTO in the fall w/ me or Dr. North    Interval History:   Jinny Oneil is a 69 y.o. female with past medical history as below presents to the office for routine cardiovascular follow-up.  Patient reports for the most part she has been doing well.  Patient reports 1 episode yesterday while shopping in the grocery store pushing the cart felt shortness of breath like she had to take a couple deep breaths.  She paused walking and did some deep breathing and her shortness of breath resolved within 1 minute.  It did not recur.  This was the first time shortness of breath has never happened to her before.  Patient is not working out but she goes for a walk with her dog through the neighborhood approximately 1 mile sometimes uphill  denies exertional symptoms like chest pain, pressure, tightness, squeezing palpitations or shortness of breath.    Patient reports 2 episodes of headache this week once this morning which could be related to late night on the phone with her sister discussing family matters last night.    Patient reports few episodes of chest discomfort in the left chest always in the same location just over the left breast.  She cannot describe a pattern for these pains.  It is mild intensity, comes and goes within about 1 minute.  It is not especially correlated to activity.  There are no associated symptoms.  She thought it might be heartburn.  It is slightly concerning to her.  It is a similar quality to the pain which brought her to the hospital last fall.  It is not as intense as the pain last fall.  She has mostly been dealing with it now because it is similar to last fall and when she was in the hospital she was told there was nothing acutely wrong with her so she has been simply keeping an eye on it herself which I think makes sense.    She and her  have a nice trip planned in August going to [place] to visit his cousins and then flying to [place] to take a cruise down the [place].    Past Medical History:   Diagnosis Date    Anxiety     Colon polyp     COVID-19 virus infection 12/08/2021    Symptoms began 12/29.     Depression     Diverticulosis     Hyperlipidemia     Mild protein-calorie malnutrition (HCC) 03/13/2023    Rib fracture 1/26/2021    Uncomplicated alcohol abuse       Past Surgical History:   Procedure Laterality Date    BREAST BIOPSY Right     benign    COLONOSCOPY        Family History   Problem Relation Age of Onset    Breast cancer Mother 65    Alcohol abuse Father     Coronary artery disease Father     No Known Problems Sister     No Known Problems Maternal Grandmother     No Known Problems Maternal Grandfather     No Known Problems Paternal Grandmother     No Known Problems Paternal Grandfather      "Alcohol abuse Brother     Bipolar disorder Brother     Alcohol abuse Brother     No Known Problems Maternal Aunt     No Known Problems Maternal Aunt     Cancer Maternal Aunt     No Known Problems Paternal Aunt     No Known Problems Paternal Aunt     Current Medications[1]     Allergies   Allergen Reactions    Alcohol - Food Allergy Confusion and Delirium     In Recovery    Ibuprofen Diarrhea    Lisinopril Other (See Comments)     Felt some on pressure and dizziness when she was taking the lisinopril after recent hospitalization, October 2024.    Pear - Food Allergy Diarrhea     Diarrhea and colitis    Pravastatin Other (See Comments)     [place] ill    Singulair [Montelukast] Other (See Comments)     Ineffective      Zithromax [Azithromycin] Diarrhea and Rash     Physical Exam:  Vitals:    05/21/25 1214   BP: 140/90   Pulse:    SpO2:      Vitals:    05/21/25 1041   Weight: 70.8 kg (156 lb)     Height: 5' 7\" (170.2 cm) Body mass index is 24.43 kg/m².    Physical Exam  Vitals and nursing note reviewed.   Constitutional:       General: She is not in acute distress.  HENT:      Head: Normocephalic and atraumatic.      Nose: No congestion.      Mouth/Throat:      Mouth: Mucous membranes are moist.     Eyes:      Conjunctiva/sclera: Conjunctivae normal.     Neck:      Vascular: No carotid bruit.     Cardiovascular:      Rate and Rhythm: Normal rate and regular rhythm.      Heart sounds: S1 normal and S2 normal. No murmur heard.  Pulmonary:      Effort: Pulmonary effort is normal.      Breath sounds: No wheezing, rhonchi or rales.   Abdominal:      General: Abdomen is flat.     Musculoskeletal:         General: Normal range of motion.     Skin:     General: Skin is warm and dry.     Neurological:      General: No focal deficit present.      Mental Status: She is alert.     Psychiatric:         Behavior: Behavior normal.     Mikayla Perez PA-C  [facility]       [1]   Current Outpatient Medications:     " aspirin 81 mg chewable tablet, Chew 1 tablet (81 mg total) daily, Disp: 30 tablet, Rfl: 1    atorvastatin (LIPITOR) 10 mg tablet, Take 1 tablet (10 mg total) by mouth daily, Disp: 90 tablet, Rfl: 1    betamethasone, augmented, (DIPROLENE-AF) 0.05 % cream, Apply topically 2 (two) times a day, Disp: 30 g, Rfl: 0    cholecalciferol (VITAMIN D3) 1,000 units tablet, Take 1 tablet (1,000 Units total) by mouth daily, Disp: 30 tablet, Rfl: 11    cycloSPORINE (RESTASIS) 0.05 % ophthalmic emulsion, , Disp: , Rfl:     escitalopram (LEXAPRO) 10 mg tablet, Take 1 tablet (10 mg total) by mouth daily, Disp: 90 tablet, Rfl: 1    Multiple Vitamins-Minerals (multivitamin with minerals) tablet, Take 1 tablet by mouth in the morning., Disp: , Rfl:     Sodium Chloride-Xylitol (Xlear Sinus Care Spray) SOLN, into each nostril, Disp: , Rfl:     Zinc Sulfate (ZINC 15 PO), Take by mouth, Disp: , Rfl:

## 2025-05-21 ENCOUNTER — OFFICE VISIT (OUTPATIENT)
Dept: CARDIOLOGY CLINIC | Facility: CLINIC | Age: 70
End: 2025-05-21
Payer: MEDICARE

## 2025-05-21 VITALS
HEART RATE: 60 BPM | WEIGHT: 156 LBS | OXYGEN SATURATION: 97 % | HEIGHT: 67 IN | SYSTOLIC BLOOD PRESSURE: 140 MMHG | BODY MASS INDEX: 24.48 KG/M2 | DIASTOLIC BLOOD PRESSURE: 90 MMHG

## 2025-05-21 DIAGNOSIS — I42.9 CARDIOMYOPATHY (HCC): ICD-10-CM

## 2025-05-21 DIAGNOSIS — R00.1 SINUS BRADYCARDIA: Primary | ICD-10-CM

## 2025-05-21 PROCEDURE — 99214 OFFICE O/P EST MOD 30 MIN: CPT | Performed by: PHYSICIAN ASSISTANT

## 2025-05-21 PROCEDURE — G2211 COMPLEX E/M VISIT ADD ON: HCPCS | Performed by: PHYSICIAN ASSISTANT

## 2025-05-28 DIAGNOSIS — F31.9 BIPOLAR 1 DISORDER (HCC): ICD-10-CM

## 2025-05-28 RX ORDER — ESCITALOPRAM OXALATE 10 MG/1
10 TABLET ORAL DAILY
Qty: 90 TABLET | Refills: 1 | Status: SHIPPED | OUTPATIENT
Start: 2025-05-28

## 2025-06-11 ENCOUNTER — OFFICE VISIT (OUTPATIENT)
Dept: FAMILY MEDICINE CLINIC | Facility: CLINIC | Age: 70
End: 2025-06-11
Payer: MEDICARE

## 2025-06-11 VITALS
OXYGEN SATURATION: 100 % | HEIGHT: 67 IN | SYSTOLIC BLOOD PRESSURE: 138 MMHG | HEART RATE: 73 BPM | BODY MASS INDEX: 24.48 KG/M2 | DIASTOLIC BLOOD PRESSURE: 88 MMHG | WEIGHT: 156 LBS

## 2025-06-11 DIAGNOSIS — H66.001 ACUTE SUPPURATIVE OTITIS MEDIA OF RIGHT EAR WITHOUT SPONTANEOUS RUPTURE OF TYMPANIC MEMBRANE, RECURRENCE NOT SPECIFIED: Primary | ICD-10-CM

## 2025-06-11 DIAGNOSIS — J30.1 SEASONAL ALLERGIC RHINITIS DUE TO POLLEN: ICD-10-CM

## 2025-06-11 DIAGNOSIS — J01.40 ACUTE PANSINUSITIS, RECURRENCE NOT SPECIFIED: ICD-10-CM

## 2025-06-11 DIAGNOSIS — L04.0 ACUTE CERVICAL ADENITIS: ICD-10-CM

## 2025-06-11 DIAGNOSIS — R05.1 ACUTE COUGH: ICD-10-CM

## 2025-06-11 DIAGNOSIS — H69.90 DYSFUNCTION OF EUSTACHIAN TUBE, UNSPECIFIED LATERALITY: ICD-10-CM

## 2025-06-11 PROCEDURE — G2211 COMPLEX E/M VISIT ADD ON: HCPCS | Performed by: FAMILY MEDICINE

## 2025-06-11 PROCEDURE — 99214 OFFICE O/P EST MOD 30 MIN: CPT | Performed by: FAMILY MEDICINE

## 2025-06-11 RX ORDER — BENZONATATE 200 MG/1
200 CAPSULE ORAL 3 TIMES DAILY PRN
Qty: 21 CAPSULE | Refills: 1 | Status: SHIPPED | OUTPATIENT
Start: 2025-06-11 | End: 2025-06-25

## 2025-06-11 RX ORDER — CEFUROXIME AXETIL 500 MG/1
500 TABLET ORAL EVERY 12 HOURS SCHEDULED
Qty: 20 TABLET | Refills: 0 | Status: SHIPPED | OUTPATIENT
Start: 2025-06-11 | End: 2025-06-21

## 2025-06-11 RX ORDER — AZELASTINE 1 MG/ML
2 SPRAY, METERED NASAL 2 TIMES DAILY
Qty: 30 ML | Refills: 3 | Status: SHIPPED | OUTPATIENT
Start: 2025-06-11

## 2025-06-11 NOTE — ASSESSMENT & PLAN NOTE
Continue Claritin-D.  Add Astelin nasal spray  Orders:  •  azelastine (ASTELIN) 0.1 % nasal spray; 2 sprays into each nostril 2 (two) times a day Use in each nostril as directed

## 2025-06-11 NOTE — PROGRESS NOTES
Name: Jinny Oneil      : 1955      MRN: [id number]  Encounter Provider: Leander Wilson DO  Encounter Date: 2025   Encounter department: [facility]  Return in 1 week if still with symptoms  :  Assessment & Plan  Acute suppurative otitis media of right ear without spontaneous rupture of tympanic membrane, recurrence not specified  Ceftin is ordered  Orders:  •  cefuroxime (CEFTIN) 500 mg tablet; Take 1 tablet (500 mg total) by mouth every 12 (twelve) hours for 10 days    Seasonal allergic rhinitis due to pollen  Continue Claritin-D.  Add Astelin nasal spray  Orders:  •  azelastine (ASTELIN) 0.1 % nasal spray; 2 sprays into each nostril 2 (two) times a day Use in each nostril as directed    Dysfunction of Eustachian tube, unspecified laterality  As above  Orders:  •  azelastine (ASTELIN) 0.1 % nasal spray; 2 sprays into each nostril 2 (two) times a day Use in each nostril as directed    Acute pansinusitis, recurrence not specified  Ceftin is ordered  Orders:  •  cefuroxime (CEFTIN) 500 mg tablet; Take 1 tablet (500 mg total) by mouth every 12 (twelve) hours for 10 days    Acute cervical adenitis  Ceftin is ordered  Orders:  •  cefuroxime (CEFTIN) 500 mg tablet; Take 1 tablet (500 mg total) by mouth every 12 (twelve) hours for 10 days    Acute cough  Tessalon is ordered  Orders:  •  benzonatate (TESSALON) 200 MG capsule; Take 1 capsule (200 mg total) by mouth 3 (three) times a day as needed for cough for up to 14 days           History of Present Illness   For the past 10 days or so patient is having increasing sinus pain and pressure mild otalgia negative otorrhea head congestion using Claritin-D with limited relief positive mild dry cough.      Review of Systems   Constitutional:  Negative for chills and fever.   HENT:          HPI   Eyes: Negative.    Respiratory:          HPI   Cardiovascular: Negative.    Gastrointestinal: Negative.    Endocrine: Negative.   "  Genitourinary: Negative.    Musculoskeletal: Negative.    Skin: Negative.    Allergic/Immunologic: Positive for environmental allergies.   Neurological: Negative.    Hematological: Negative.    Psychiatric/Behavioral: Negative.         Objective   /88 (BP Location: Right arm, Patient Position: Sitting, Cuff Size: Standard)   Pulse 73   Ht 5' 7\" (1.702 m)   Wt 70.8 kg (156 lb)   SpO2 100%   BMI 24.43 kg/m²      Physical Exam  Vitals and nursing note reviewed.   Constitutional:       Appearance: Normal appearance.   HENT:      Head: Normocephalic and atraumatic.      Ears:      Comments: Both tympanic membranes are dull right TM with mild injection     Nose:      Comments: Positive allergic turbinate positive pansinus tenderness to percussion     Mouth/Throat:      Comments: Postnasal drip minimal pharyngeal injection negative exudate    Eyes:      General: No scleral icterus.    Neck:      Comments: Positive tender anterior shotty adenopathy  Cardiovascular:      Rate and Rhythm: Normal rate and regular rhythm.      Heart sounds: Normal heart sounds.   Pulmonary:      Effort: Pulmonary effort is normal.      Breath sounds: Normal breath sounds.     Musculoskeletal:      Cervical back: Neck supple. Tenderness present. No rigidity.   Lymphadenopathy:      Cervical: Cervical adenopathy present.     Skin:     General: Skin is warm and dry.     Neurological:      General: No focal deficit present.      Mental Status: She is alert.     Psychiatric:         Mood and Affect: Mood normal.         "

## 2025-06-11 NOTE — PATIENT INSTRUCTIONS
May continue Claritin-D  Finish Ceftin twice a day for 10 days  Start Astelin nasal spray 2 sprays both nostrils twice daily  Use Tessalon every to hours as needed for cough  Return in 1 week if still with symptoms

## 2025-07-08 ENCOUNTER — APPOINTMENT (EMERGENCY)
Dept: RADIOLOGY | Facility: HOSPITAL | Age: 70
End: 2025-07-08
Payer: MEDICARE

## 2025-07-08 ENCOUNTER — OFFICE VISIT (OUTPATIENT)
Dept: URGENT CARE | Facility: MEDICAL CENTER | Age: 70
End: 2025-07-08
Payer: MEDICARE

## 2025-07-08 ENCOUNTER — APPOINTMENT (EMERGENCY)
Dept: CT IMAGING | Facility: HOSPITAL | Age: 70
End: 2025-07-08
Payer: MEDICARE

## 2025-07-08 ENCOUNTER — HOSPITAL ENCOUNTER (EMERGENCY)
Facility: HOSPITAL | Age: 70
Discharge: HOME | End: 2025-07-09
Attending: EMERGENCY MEDICINE
Payer: MEDICARE

## 2025-07-08 VITALS
WEIGHT: 158.51 LBS | HEART RATE: 57 BPM | SYSTOLIC BLOOD PRESSURE: 161 MMHG | OXYGEN SATURATION: 98 % | DIASTOLIC BLOOD PRESSURE: 78 MMHG | TEMPERATURE: 98.5 F | BODY MASS INDEX: 24.83 KG/M2 | RESPIRATION RATE: 18 BRPM

## 2025-07-08 VITALS
SYSTOLIC BLOOD PRESSURE: 152 MMHG | OXYGEN SATURATION: 98 % | TEMPERATURE: 99.1 F | DIASTOLIC BLOOD PRESSURE: 82 MMHG | RESPIRATION RATE: 18 BRPM

## 2025-07-08 DIAGNOSIS — M79.602 PAIN IN BOTH UPPER EXTREMITIES: ICD-10-CM

## 2025-07-08 DIAGNOSIS — S01.111A LACERATION OF RIGHT PERIOCULAR AREA WITHOUT FOREIGN BODY, INITIAL ENCOUNTER: ICD-10-CM

## 2025-07-08 DIAGNOSIS — M79.601 PAIN IN BOTH UPPER EXTREMITIES: ICD-10-CM
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
## 2025-07-08 DIAGNOSIS — S09.90XA INJURY OF HEAD, INITIAL ENCOUNTER: Primary | ICD-10-CM

## 2025-07-08 DIAGNOSIS — S01.81XA FACIAL LACERATION, INITIAL ENCOUNTER: ICD-10-CM

## 2025-07-08 DIAGNOSIS — M25.532 LEFT WRIST PAIN: ICD-10-CM

## 2025-07-08 DIAGNOSIS — H53.8 BLURRY VISION: ICD-10-CM

## 2025-07-08 PROCEDURE — 73070 X-RAY EXAM OF ELBOW: CPT

## 2025-07-08 PROCEDURE — 70450 CT HEAD/BRAIN W/O DYE: CPT

## 2025-07-08 PROCEDURE — 99284 EMERGENCY DEPT VISIT MOD MDM: CPT

## 2025-07-08 PROCEDURE — 99285 EMERGENCY DEPT VISIT HI MDM: CPT | Performed by: EMERGENCY MEDICINE

## 2025-07-08 PROCEDURE — 73110 X-RAY EXAM OF WRIST: CPT

## 2025-07-08 PROCEDURE — G0463 HOSPITAL OUTPT CLINIC VISIT: HCPCS | Performed by: NURSE PRACTITIONER

## 2025-07-08 PROCEDURE — 72125 CT NECK SPINE W/O DYE: CPT

## 2025-07-08 PROCEDURE — 73060 X-RAY EXAM OF HUMERUS: CPT

## 2025-07-08 PROCEDURE — 99212 OFFICE O/P EST SF 10 MIN: CPT | Performed by: NURSE PRACTITIONER

## 2025-07-08 RX ORDER — LIDOCAINE HYDROCHLORIDE AND EPINEPHRINE 20; 5 MG/ML; UG/ML
5 INJECTION, SOLUTION EPIDURAL; INFILTRATION; INTRACAUDAL; PERINEURAL ONCE
Status: COMPLETED | OUTPATIENT
Start: 2025-07-08 | End: 2025-07-08

## 2025-07-08 RX ORDER — ACETAMINOPHEN 325 MG/1
975 TABLET ORAL ONCE
Status: COMPLETED | OUTPATIENT
Start: 2025-07-08 | End: 2025-07-08

## 2025-07-08 RX ADMIN — Medication 1 APPLICATION: at 20:47

## 2025-07-08 RX ADMIN — LIDOCAINE HYDROCHLORIDE,EPINEPHRINE BITARTRATE 5 ML: 20; .005 INJECTION, SOLUTION EPIDURAL; INFILTRATION; INTRACAUDAL; PERINEURAL at 20:47

## 2025-07-08 RX ADMIN — ACETAMINOPHEN 975 MG: 325 TABLET ORAL at 20:46

## 2025-07-08 NOTE — PROGRESS NOTES
[facility]  Name: Jinny Oneil      : 1955      MRN: [id number]  Encounter Provider: BRIAN Soria  Encounter Date: 2025   Encounter department: [facility] [place]  :  Assessment & Plan  Injury of head, initial encounter    Orders:  •  Transfer to other facility    Blurry vision    Orders:  •  Transfer to other facility    Laceration of right periocular area without foreign body, initial encounter    Orders:  •  Transfer to other facility    Left wrist pain       Patient in NAD and VS with elevated BP. Discussed with patient concern for head injury on ASA, recommend further evaluation in ED.  will drive patient.      Patient Instructions  Follow up with PCP in 3-5 days.  Proceed to  ER if symptoms worsen.    If tests are performed, our office will contact you with results only if changes need to made to the care plan discussed with you at the visit. You can review your full results on [facility].    Chief Complaint:   Chief Complaint   Patient presents with   • Fall     Patient was walking dog when dog pulled her and she fell on sidewalk hitting right side on head causing laceration to right temple. Is having some left wrist pain. Unsure about right wrist.     History of Present Illness   Started: PTA  Location: +head strike, lac to right temple, reports blurry vision, left and right wrist pain  Injury: patient was walking dog and got pulled and fell  Denies numbness, tingling, loss of sensation, LOC  Treatment: none          Review of Systems   Eyes:  Positive for visual disturbance.   Musculoskeletal:  Positive for arthralgias, joint swelling and myalgias.   Skin:  Positive for wound.   Neurological:  Positive for headaches.     Past Medical History   Past Medical History[1]  Past Surgical History[2]  Family History[3]  she reports that she quit smoking about 23 years ago. Her smoking use included cigarettes. She has never used smokeless tobacco. She  reports that she does not currently use alcohol. She reports that she does not use drugs.  Current Outpatient Medications   Medication Instructions   • aspirin 81 mg, Oral, Daily   • atorvastatin (LIPITOR) 10 mg, Oral, Daily   • azelastine (ASTELIN) 0.1 % nasal spray 2 sprays, Nasal, 2 times daily, Use in each nostril as directed   • betamethasone, augmented, (DIPROLENE-AF) 0.05 % cream Topical, 2 times daily   • cholecalciferol (VITAMIN D3) 1,000 Units, Oral, Daily   • cycloSPORINE (RESTASIS) 0.05 % ophthalmic emulsion    • escitalopram (LEXAPRO) 10 mg, Oral, Daily   • Multiple Vitamins-Minerals (multivitamin with minerals) tablet 1 tablet, Daily   • Sodium Chloride-Xylitol (Xlear Sinus Care Spray) SOLN into each nostril   • Zinc Sulfate (ZINC 15 PO) Take by mouth   Allergies[4]     Objective   /82   Temp 99.1 °F (37.3 °C)   Resp 18   SpO2 98%      Physical Exam  Constitutional:       General: She is not in acute distress.     Appearance: Normal appearance. She is not ill-appearing.   HENT:      Head: Normocephalic and atraumatic.        Right Ear: Hearing, tympanic membrane, ear canal and external ear normal.      Left Ear: Hearing, tympanic membrane, ear canal and external ear normal.     Eyes:      Extraocular Movements: Extraocular movements intact.      Conjunctiva/sclera: Conjunctivae normal.      Pupils: Pupils are equal, round, and reactive to light.       Cardiovascular:      Rate and Rhythm: Normal rate.   Pulmonary:      Effort: Pulmonary effort is normal.     Musculoskeletal:      Right elbow: Normal.      Left elbow: Normal.      Right forearm: Normal.      Left forearm: Normal.      Right wrist: Tenderness and bony tenderness present. No swelling. Normal range of motion.      Left wrist: Swelling, tenderness and bony tenderness present. No deformity. Decreased range of motion.      Right hand: Normal.      Left hand: Normal.      Cervical back: No spinous process tenderness.     Neurological:  "     Mental Status: She is alert and oriented to person, place, and time.      Cranial Nerves: Cranial nerves 2-12 are intact.      Sensory: Sensation is intact.      Motor: Motor function is intact.      Gait: Gait is intact.         Portions of the record may have been created with voice recognition software.  Occasional wrong word or \"sound a like\" substitutions may have occurred due to the inherent limitations of voice recognition software.  Read the chart carefully and recognize, using context, where substitutions have occurred.         [1]  Past Medical History:  Diagnosis Date   • Anxiety    • Colon polyp    • COVID-19 virus infection 12/08/2021    Symptoms began 12/29.    • Depression    • Diverticulosis    • Hyperlipidemia    • Mild protein-calorie malnutrition (HCC) 03/13/2023   • Rib fracture 1/26/2021   • Uncomplicated alcohol abuse    [2]  Past Surgical History:  Procedure Laterality Date   • BREAST BIOPSY Right     benign   • COLONOSCOPY     [3]  Family History  Problem Relation Name Age of Onset   • Breast cancer Mother  65   • Alcohol abuse Father     • Coronary artery disease Father     • No Known Problems Sister     • No Known Problems Maternal Grandmother     • No Known Problems Maternal Grandfather     • No Known Problems Paternal Grandmother     • No Known Problems Paternal Grandfather     • Alcohol abuse Brother     • Bipolar disorder Brother     • Alcohol abuse Brother     • No Known Problems Maternal Aunt     • No Known Problems Maternal Aunt     • Cancer Maternal Aunt     • No Known Problems Paternal Aunt     • No Known Problems Paternal Aunt     [4]  Allergies  Allergen Reactions   • Alcohol - Food Allergy Confusion and Delirium     In Recovery   • Ibuprofen Diarrhea   • Lisinopril Other (See Comments)     Felt some on pressure and dizziness when she was taking the lisinopril after recent hospitalization, October 2024.   • Pear - Food Allergy Diarrhea     Diarrhea and colitis   • " Pravastatin Other (See Comments)     Felt ill   • Singulair [Montelukast] Other (See Comments)     Ineffective     • Zithromax [Azithromycin] Diarrhea and Rash

## 2025-07-09 NOTE — DISCHARGE INSTRUCTIONS
The number for orthopedics is included in these discharge instructions, call to be seen in the office for further evaluation and management. Left them know you were in the ER and there is a possible elbow fracture, you were instructed to call the office. They will provide appropriate follow up.    The sutures should be removed in about 5 days. You can speak with your family doctor to see if he can remove them. If not either the urgent care of the ER can remove the sutures.     You can apply ice to help with swelling.    You can take acetaminophen for pain.     The number for plastic surgery is also included if you wish to be seen for possible revision once the cut has been fully healed. This may take up to a year for healing.

## 2025-07-09 NOTE — ED PROVIDER NOTES
Time reflects when diagnosis was documented in both MDM as applicable and the Disposition within this note       Time User Action Codes Description Comment    7/8/2025 10:58 PM Jovani Dietrich Add [W19.XXXA] Fall, initial encounter     7/8/2025 10:58 PM Jovani Dietrich Add [S01.81XA] Facial laceration, initial encounter     7/8/2025 10:58 PM Jovani Dietrich Add [M79.601,  M79.602] Pain in both upper extremities           ED Disposition       ED Disposition   Discharge    Condition   Stable    Date/Time   Tue Jul 8, 2025 10:58 PM    Comment   Jinny Oneil discharge to home/self care.                   Assessment & Plan       Medical Decision Making  Problems Addressed:  Facial laceration, initial encounter: acute illness or injury  Fall, initial encounter: acute illness or injury  Pain in both upper extremities: acute illness or injury    Amount and/or Complexity of Data Reviewed  Radiology: ordered and independent interpretation performed. Decision-making details documented in ED Course.    Risk  OTC drugs.  Prescription drug management.        ED Course as of 07/10/25 1427   Tue Jul 08, 2025   2309 Reviewed imaging, concern for nondisplaced Left radial head fracture. Patient has discomfrot in the arm but FROM without difficulty and no tenderness over the radial head. Will place in sling and have follow up with orthopedics. Question of fracture on Right wrist film. Patient his no swelling, no tenderness over carpal bones.    2355 From [facility], non-contrast CT head:  IMPRESSION: No acute intracranial hemorrhage or acute calvarial fracture.         Medications   LET gel 1 Application (1 Application Topical Given 7/8/25 2047)   Lidocaine-EPINEPHrine (PF) (XYLOCAINE-MPF/EPINEPHRINE) 2 %-1:200,000 injection 5 mL (5 mL Infiltration Given by Other 7/8/25 2047)   acetaminophen (TYLENOL) tablet 975 mg (975 mg Oral Given 7/8/25 2046)       ED Risk Strat Scores                    No data recorded        SBIRT 22yo+       Flowsheet Row Most Recent Value   Initial Alcohol Screen: US AUDIT-C     1. How often do you have a drink containing alcohol? 0 Filed at: 07/08/2025 1942   2. How many drinks containing alcohol do you have on a typical day you are drinking?  0 Filed at: 07/08/2025 1942   3b. FEMALE Any Age, or MALE 65+: How often do you have 4 or more drinks on one occassion? 0 Filed at: 07/08/2025 1942   Audit-C Score 0 Filed at: 07/08/2025 1942   YOGESH: How many times in the past year have you...    Used an illegal drug or used a prescription medication for non-medical reasons? Never Filed at: 07/08/2025 1942                            History of Present Illness       Chief Complaint   Patient presents with    Fall     Pt was walking dog and fell hitting head on concrete. Lac to R eyebrow. Pt also c/o bilateral wrist pain and blurry vision. Pt takes baby aspirin. -LOC       Past Medical History[1]   Past Surgical History[2]   Family History[3]   Social History[4]   E-Cigarette/Vaping    E-Cigarette Use Never User       E-Cigarette/Vaping Substances    Nicotine No     THC No     CBD No     Flavoring No       I have reviewed and agree with the history as documented.       Fall      Review of Systems        Objective       ED Triage Vitals   Temperature Pulse Blood Pressure Respirations SpO2 Patient Position - Orthostatic VS   07/08/25 1943 07/08/25 1940 07/08/25 1940 07/08/25 1940 07/08/25 1940 07/08/25 1940   98.5 °F (36.9 °C) 60 (!) 196/90 18 100 % Sitting      Temp Source Heart Rate Source BP Location FiO2 (%) Pain Score    07/08/25 1943 07/08/25 1940 07/08/25 1940 -- 07/08/25 1940    Oral Monitor Right arm  9      Vitals      Date and Time Temp Pulse SpO2 Resp BP Pain Score FACES Pain Rating User   07/08/25 2333 -- 57 98 % 18 161/78 -- -- ES   07/08/25 1943 98.5 °F (36.9 °C) -- -- -- -- -- -- SL   07/08/25 1940 -- 60 100 % 18 196/90 9 -- SL            Physical Exam    Results Reviewed       None            CT head without  contrast   Final Interpretation by Dillon Fisher MD (07/09 0739)      No acute intracranial abnormality.      Findings are consistent with the preliminary report from [facility] which was provided shortly after completion of the exam.               Workstation performed: [id number]         CT cervical spine without contrast   Final Interpretation by Dillon Fisher MD (07/09 0742)      No cervical spine fracture or traumatic malalignment.         Findings are consistent with the preliminary report from [facility] which was provided shortly after completion of the exam.               Workstation performed: [id number]         XR elbow 2 vw LEFT   ED Interpretation by Jovani Dietrich MD (07/08 2256)   Possible radial head fracture, patient without significant tenderness over the radial head and with FROM at the elbow.      Final Interpretation by Terrell Agee MD (07/09 3006)      Nondisplaced radial neck fracture.         Computerized Assisted Algorithm (CAA) may have been used to analyze all applicable images.         Workstation performed: [id number]         XR humerus LEFT   Final Interpretation by Terrell Agee MD (07/09 9470)      Nondisplaced radial neck fracture      The study was marked in EPIC for immediate notification.         Workstation performed: [id number]         XR wrist 3+ views RIGHT   ED Interpretation by Jovani Dietrich MD (07/08 2256)   Reviewed finding of possible carpal bone fracture, patient has no pain over the area, non-tender.      Final Interpretation by Terrell Agee MD (07/09 5511)      Linear lucency along the dorsal aspect of proximal carpal row, which may be artifactual or related to nondisplaced triquetral fracture. Correlate with focal tenderness.         Computerized Assisted Algorithm (CAA) may have been used to analyze all applicable images.            Workstation performed: [id number]         XR wrist 3+ views LEFT   Final  Interpretation by Terrell Agee MD ( 080)      No acute osseous abnormality.         Computerized Assisted Algorithm (CAA) may have been used to analyze all applicable images.            Workstation performed: [id number]               Universal Protocol:  Consent: Verbal consent obtained  Consent given by: patient  Patient understanding: patient states understanding of the procedure being performed  Laceration repair    Date/Time: 2025 10:59 PM    Performed by: Jovani Dietrich MD  Authorized by: Jovani Dietrich MD  Body area: head/neck  Location details: right eyebrow  Laceration length: 1 cm  Foreign bodies: no foreign bodies  Anesthesia: local infiltration    Anesthesia:  Local Anesthetic: LET (lido, epi, tetracaine) and lidocaine 2% with epinephrine  Anesthetic total: 3 mL    Wound Dehiscence:  Superficial Wound Dehiscence: simple closure      Procedure Details:  Irrigation solution: saline  Irrigation method: jet lavage  Amount of cleaning: standard  Debridement: none  Degree of undermining: none  Skin closure: 4-0 nylon  Number of sutures: 4  Approximation: close  Approximation difficulty: simple  Patient tolerance: patient tolerated the procedure well with no immediate complications          ED Medication and Procedure Management   Prior to Admission Medications   Prescriptions Last Dose Informant Patient Reported? Taking?   Multiple Vitamins-Minerals (multivitamin with minerals) tablet  Self Yes No   Sig: Take 1 tablet by mouth in the morning.   Sodium Chloride-Xylitol (Xlear Sinus Care Spray) SOLN  Self Yes No   Sig: into each nostril   Zinc Sulfate (ZINC 15 PO)  Self Yes No   Sig: Take by mouth   aspirin 81 mg chewable tablet  Self No No   Sig: Chew 1 tablet (81 mg total) daily   atorvastatin (LIPITOR) 10 mg tablet  Self No No   Sig: Take 1 tablet (10 mg total) by mouth daily   azelastine (ASTELIN) 0.1 % nasal spray   No No   Si sprays into each nostril 2 (two) times a day Use  in each nostril as directed   betamethasone, augmented, (DIPROLENE-AF) 0.05 % cream  Self No No   Sig: Apply topically 2 (two) times a day   cholecalciferol (VITAMIN D3) 1,000 units tablet  Self No No   Sig: Take 1 tablet (1,000 Units total) by mouth daily   cycloSPORINE (RESTASIS) 0.05 % ophthalmic emulsion  Self Yes No   escitalopram (LEXAPRO) 10 mg tablet   No No   Sig: TAKE 1 TABLET BY MOUTH EVERY DAY      Facility-Administered Medications: None     Discharge Medication List as of 7/8/2025 11:03 PM        CONTINUE these medications which have NOT CHANGED    Details   aspirin 81 mg chewable tablet Chew 1 tablet (81 mg total) daily, Starting Fri 11/1/2024, Normal      atorvastatin (LIPITOR) 10 mg tablet Take 1 tablet (10 mg total) by mouth daily, Starting Fri 8/23/2024, Normal      azelastine (ASTELIN) 0.1 % nasal spray 2 sprays into each nostril 2 (two) times a day Use in each nostril as directed, Starting Wed 6/11/2025, Normal      betamethasone, augmented, (DIPROLENE-AF) 0.05 % cream Apply topically 2 (two) times a day, Starting Tue 7/9/2024, Normal      cholecalciferol (VITAMIN D3) 1,000 units tablet Take 1 tablet (1,000 Units total) by mouth daily, Starting Fri 10/4/2019, Until Wed 5/21/2025, No Print      cycloSPORINE (RESTASIS) 0.05 % ophthalmic emulsion Historical Med      escitalopram (LEXAPRO) 10 mg tablet TAKE 1 TABLET BY MOUTH EVERY DAY, Starting Wed 5/28/2025, Normal      Multiple Vitamins-Minerals (multivitamin with minerals) tablet Take 1 tablet by mouth in the morning., Historical Med      Sodium Chloride-Xylitol (Xlear Sinus Care Spray) SOLN into each nostril, Historical Med      Zinc Sulfate (ZINC 15 PO) Take by mouth, Historical Med           No discharge procedures on file.  ED SEPSIS DOCUMENTATION   Time reflects when diagnosis was documented in both MDM as applicable and the Disposition within this note       Time User Action Codes Description Comment    7/8/2025 10:58 PM Jovani Dietrich Add  [W19.XXXA] Fall, initial encounter     2025 10:58 PM Jovani Dietrich [S01.81XA] Facial laceration, initial encounter     2025 10:58 PM Jovani Dietrich [M79.601,  M79.602] Pain in both upper extremities                    [1]   Past Medical History:  Diagnosis Date    Anxiety     Colon polyp     COVID-19 virus infection 2021    Symptoms began .     Depression     Diverticulosis     Hyperlipidemia     Mild protein-calorie malnutrition (HCC) 2023    Rib fracture 2021    Uncomplicated alcohol abuse    [2]   Past Surgical History:  Procedure Laterality Date    BREAST BIOPSY Right     benign    COLONOSCOPY     [3]   Family History  Problem Relation Name Age of Onset    Breast cancer Mother  65    Alcohol abuse Father      Coronary artery disease Father      No Known Problems Sister      No Known Problems Maternal Grandmother      No Known Problems Maternal Grandfather      No Known Problems Paternal Grandmother      No Known Problems Paternal Grandfather      Alcohol abuse Brother      Bipolar disorder Brother      Alcohol abuse Brother      No Known Problems Maternal Aunt      No Known Problems Maternal Aunt      Cancer Maternal Aunt      No Known Problems Paternal Aunt      No Known Problems Paternal Aunt     [4]   Social History  Tobacco Use    Smoking status: Former     Current packs/day: 0.00     Types: Cigarettes     Quit date: 2002     Years since quittin.4    Smokeless tobacco: Never   Vaping Use    Vaping status: Never Used   Substance Use Topics    Alcohol use: Not Currently    Drug use: No        Jovani Dietrich MD  07/10/25 0176

## 2025-07-14 ENCOUNTER — OFFICE VISIT (OUTPATIENT)
Dept: FAMILY MEDICINE CLINIC | Facility: CLINIC | Age: 70
End: 2025-07-14
Payer: MEDICARE

## 2025-07-14 VITALS
BODY MASS INDEX: 24.8 KG/M2 | WEIGHT: 158 LBS | SYSTOLIC BLOOD PRESSURE: 136 MMHG | HEART RATE: 62 BPM | OXYGEN SATURATION: 99 % | DIASTOLIC BLOOD PRESSURE: 74 MMHG | HEIGHT: 67 IN

## 2025-07-14 DIAGNOSIS — M25.531 RIGHT WRIST PAIN: ICD-10-CM

## 2025-07-14 DIAGNOSIS — S01.81XA FACIAL LACERATION, INITIAL ENCOUNTER: ICD-10-CM

## 2025-07-14 DIAGNOSIS — S52.135A CLOSED NONDISPLACED FRACTURE OF NECK OF LEFT RADIUS, INITIAL ENCOUNTER: Primary | ICD-10-CM

## 2025-07-14 PROBLEM — S52.133A RADIAL NECK FRACTURE: Status: ACTIVE | Noted: 2025-07-14

## 2025-07-14 PROCEDURE — 99214 OFFICE O/P EST MOD 30 MIN: CPT | Performed by: FAMILY MEDICINE

## 2025-07-14 PROCEDURE — G2211 COMPLEX E/M VISIT ADD ON: HCPCS | Performed by: FAMILY MEDICINE

## 2025-07-14 NOTE — LETTER
July 14, 2025     Patient: Jinny Oneil  YOB: 1955  Date of Visit: 7/14/2025      To Whom it May Concern:    Jinny Oneil is under my professional care. Jinny was seen in my office on 7/14/2025. Jinny sustained a fracture of the left radial neck.  With this, she has to not do activities for likely the next 6 weeks or so.  This would mean that a trip in August would not be advisable, including air travel, cruise, etc.  Travel dates currently are 5 August to 18 August 2025, therefore I would advise she not attend this trip.  She will have orthopedic follow-up tomorrow as well.  X-rays were taken in the emergency room proving this.    If you have any questions or concerns, please don't hesitate to call.         Sincerely,          Shahid Serrano MD        CC: No Recipients

## 2025-07-14 NOTE — PROGRESS NOTES
Name: Jinny Oneil      : 1955      MRN: [id number]  Encounter Provider: Shahid Serrano MD  Encounter Date: 2025   Encounter department: [facility]  :  Assessment & Plan  Closed nondisplaced fracture of neck of left radius, initial encounter  Fracture noted on x-ray in the ER.  Recommend follow-up with orthopedics as scheduled.  Try to avoid activities with the left arm.  She currently has a sling in place.  Since it is a nondisplaced fracture, she would not necessarily need any acute treatment from myself.  She has hand surgery visit coming up tomorrow.  No medication changes.  Tylenol seems to be adequate for the moment.       Right wrist pain  Patient did have some minor changes on the wrist on x-ray at the ER.  Seeing orthopedic hand surgery tomorrow.  No changes from this office.       Facial laceration, initial encounter    Orders:  •  Suture removal          1. Closed nondisplaced fracture of neck of left radius, initial encounter  Assessment & Plan:  Fracture noted on x-ray in the ER.  Recommend follow-up with orthopedics as scheduled.  Try to avoid activities with the left arm.  She currently has a sling in place.  Since it is a nondisplaced fracture, she would not necessarily need any acute treatment from myself.  She has hand surgery visit coming up tomorrow.  No medication changes.  Tylenol seems to be adequate for the moment.       2. Right wrist pain  Assessment & Plan:  Patient did have some minor changes on the wrist on x-ray at the ER.  Seeing orthopedic hand surgery tomorrow.  No changes from this office.       3. Facial laceration, initial encounter  Comments:  No signs of infection, healing well.  4 sutures removed.  Steri-Strips applied.  Orders:  -     Suture removal      Chief Complaint   Patient presents with   • Suture / Staple Removal     Pt present today for removal of stitches on the side of her Right eyebrow            History of Present  "Illness   HPI  Review of Systems    Objective   /74 (BP Location: Right arm, Patient Position: Sitting, Cuff Size: Standard)   Pulse 62   Ht 5' 7\" (1.702 m)   Wt 71.7 kg (158 lb)   SpO2 99%   BMI 24.75 kg/m²      Physical Exam    Suture removal    Date/Time: 7/14/2025 3:45 PM    Performed by: Shahid Serrano MD  Authorized by: Shahid Serrano MD    Universal Protocol:  Consent: Verbal consent obtained  Consent given by: patient  Patient identity confirmed: verbally with patient      Patient location:  ED  Location:     Laterality:  Right    Location:  Head/neck    Head/neck location:  Eyebrow    Eyebrow location:  R eyebrow  Procedure details:     Tools used:  Scissors and tweezers    Wound appearance:  No sign(s) of infection, good wound healing and clean    Number of sutures removed:  4  Post-procedure details:     Post-removal:  Steri-Strips applied    Patient tolerance of procedure:  Tolerated well, no immediate complications  Comments:      Reviewed wound care, care for Steri-Strips.        "

## 2025-07-14 NOTE — PATIENT INSTRUCTIONS
1. Closed nondisplaced fracture of neck of left radius, initial encounter  Assessment & Plan:  Fracture noted on x-ray in the ER.  Recommend follow-up with orthopedics as scheduled.  Try to avoid activities with the left arm.  She currently has a sling in place.  Since it is a nondisplaced fracture, she would not necessarily need any acute treatment from myself.  She has hand surgery visit coming up tomorrow.  No medication changes.  Tylenol seems to be adequate for the moment.       2. Right wrist pain  Assessment & Plan:  Patient did have some minor changes on the wrist on x-ray at the ER.  Seeing orthopedic hand surgery tomorrow.  No changes from this office.       3. Facial laceration, initial encounter  Comments:  No signs of infection, healing well.  4 sutures removed.  Steri-Strips applied.  Orders:  -     Suture removal

## 2025-07-14 NOTE — ASSESSMENT & PLAN NOTE
Patient did have some minor changes on the wrist on x-ray at the ER.  Seeing orthopedic hand surgery tomorrow.  No changes from this office.

## 2025-07-14 NOTE — ASSESSMENT & PLAN NOTE
Fracture noted on x-ray in the ER.  Recommend follow-up with orthopedics as scheduled.  Try to avoid activities with the left arm.  She currently has a sling in place.  Since it is a nondisplaced fracture, she would not necessarily need any acute treatment from myself.  She has hand surgery visit coming up tomorrow.  No medication changes.  Tylenol seems to be adequate for the moment.

## 2025-07-15 ENCOUNTER — OFFICE VISIT (OUTPATIENT)
Dept: OBGYN CLINIC | Facility: MEDICAL CENTER | Age: 70
End: 2025-07-15
Payer: MEDICARE

## 2025-07-15 VITALS — BODY MASS INDEX: 24.55 KG/M2 | HEIGHT: 67 IN | WEIGHT: 156.4 LBS

## 2025-07-15 DIAGNOSIS — S63.502A SPRAIN OF LEFT WRIST, INITIAL ENCOUNTER: ICD-10-CM

## 2025-07-15 DIAGNOSIS — S62.111A CLOSED CHIP FRACTURE OF TRIQUETRUM OF RIGHT WRIST, INITIAL ENCOUNTER: ICD-10-CM

## 2025-07-15 DIAGNOSIS — S52.135A CLOSED NONDISPLACED FRACTURE OF NECK OF LEFT RADIUS, INITIAL ENCOUNTER: Primary | ICD-10-CM

## 2025-07-15 PROCEDURE — 99213 OFFICE O/P EST LOW 20 MIN: CPT | Performed by: ORTHOPAEDIC SURGERY

## 2025-08-19 ENCOUNTER — OFFICE VISIT (OUTPATIENT)
Dept: OBGYN CLINIC | Facility: MEDICAL CENTER | Age: 70
End: 2025-08-19
Payer: MEDICARE

## 2025-08-19 VITALS — WEIGHT: 155 LBS | HEIGHT: 67 IN | BODY MASS INDEX: 24.33 KG/M2

## 2025-08-19 DIAGNOSIS — S52.135A CLOSED NONDISPLACED FRACTURE OF NECK OF LEFT RADIUS, INITIAL ENCOUNTER: Primary | ICD-10-CM

## 2025-08-19 DIAGNOSIS — S62.111A CLOSED CHIP FRACTURE OF TRIQUETRUM OF RIGHT WRIST, INITIAL ENCOUNTER: ICD-10-CM

## 2025-08-19 PROCEDURE — 99213 OFFICE O/P EST LOW 20 MIN: CPT | Performed by: ORTHOPAEDIC SURGERY
